# Patient Record
Sex: FEMALE | Race: WHITE | Employment: FULL TIME | URBAN - METROPOLITAN AREA
[De-identification: names, ages, dates, MRNs, and addresses within clinical notes are randomized per-mention and may not be internally consistent; named-entity substitution may affect disease eponyms.]

---
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
## 2020-01-04 ENCOUNTER — MYC MEDICAL ADVICE (OUTPATIENT)
Dept: FAMILY MEDICINE | Facility: CLINIC | Age: 20
End: 2020-01-04

## 2020-01-30 ENCOUNTER — OFFICE VISIT (OUTPATIENT)
Dept: PSYCHIATRY | Facility: CLINIC | Age: 20
End: 2020-01-30
Attending: PSYCHOLOGIST
Payer: COMMERCIAL

## 2020-01-30 DIAGNOSIS — F43.10 POSTTRAUMATIC STRESS DISORDER: Primary | ICD-10-CM

## 2020-01-31 NOTE — PROGRESS NOTES
OUTPATIENT PSYCHOTHERAPY PROGRESS NOTE    Client Name: Cassidy Fernandes   YOB: 2000 (19 year old)   Date of Service:  01/30/20  Time of Service: 3:00pm to 4:00pm (60 minutes)  Service Type(s):  25875 psychotherapy (38-52 min. with patient and/or family)    Diagnoses:   F43.10 Post-Traumatic Stress Disorder (by history)    Individuals Present: Cassidy    Treatment goal(s) being addressed: maintain progress    Subjective:  Cassidy provided updates for the past 4 months. 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 She loves her job and coworkers and is making friends with them as well. Her relationship is strong. She and her boyfriend moved out to an apartment in [place], which they enjoy.    Cassidy has been worried about her little sisters and the fact that they have not had mental health support around the family's complex trauma history and dad's cancer diagnosis. Cassidy feels some sense of guilt that mental health breakdown and subsequent treatment inadvertently took her parents' time and attention away from her siblings.    Cassidy reflected on how much she's grown since over the past year and a half. She feels a sense of maturity and priorities relative to other teenagers.    Treatment:   Cassidy was seen for individual cognitive behavioral and supportive psychotherapy. Today's session focused on obtaining updates, and processing thoughts and feelings around father's health, and Cassidy's own guilt re: siblings' mental health. Also discussed current housing, employment, and relationship goals. Cassidy would like to resume intermittent therapy sessions for progress monitoring and maintenance.    Assessment and Progress:   Cassidy is an 19 year-old female with a complex trauma history and posttraumatic stress disorder. Cassidy is doing well, overall. She and her boyfriend found housing that they are pleased with. She obtained her 's license and  has a new job that she is happy with.  Cassidy is thinking about future plans and best ways to obtain positive goals. She hs some guilt around her siblings' current mental health challenges, which she feels were 'neglected' due to parents' time and attention focused on her own needs.  Cassidy's relationships are stable and supportive. Affect was euthymic. Good eye contact. Motor activity was fidgety and restless. Cassidy was reflective and engaged. No mood or safety concerns.    Plan:   Cassidy will call as needed to schedule follow-up session for progress maintinance.      Anna Marie Gonzalez, PhD,       Licensed Psychologist    Treatment Plan review due: next visit

## 2020-02-17 ENCOUNTER — OFFICE VISIT (OUTPATIENT)
Dept: FAMILY MEDICINE | Facility: CLINIC | Age: 20
End: 2020-02-17
Payer: COMMERCIAL

## 2020-02-17 VITALS
OXYGEN SATURATION: 98 % | RESPIRATION RATE: 16 BRPM | SYSTOLIC BLOOD PRESSURE: 119 MMHG | HEIGHT: 66 IN | HEART RATE: 81 BPM | BODY MASS INDEX: 29.09 KG/M2 | DIASTOLIC BLOOD PRESSURE: 80 MMHG | TEMPERATURE: 98.2 F | WEIGHT: 181 LBS

## 2020-02-17 DIAGNOSIS — S29.011A PECTORALIS MUSCLE STRAIN, INITIAL ENCOUNTER: Primary | ICD-10-CM

## 2020-02-17 PROCEDURE — 99213 OFFICE O/P EST LOW 20 MIN: CPT | Performed by: FAMILY MEDICINE

## 2020-02-17 ASSESSMENT — MIFFLIN-ST. JEOR: SCORE: 1605.63

## 2020-02-17 ASSESSMENT — PAIN SCALES - GENERAL: PAINLEVEL: SEVERE PAIN (7)

## 2020-02-17 NOTE — PROGRESS NOTES
"Subjective     Cassidy Fernandes is a 19 year old female who presents to clinic today for the following health issues:    HPI   Joint Pain    Onset: 1.5 Week    Description:   Location: right shoulder  Character: Sharp    Intensity: moderate    Progression of Symptoms: same    Accompanying Signs & Symptoms:  Other symptoms: radiation of pain to right arm, numbness and tingling    History:   Previous similar pain: no       Precipitating factors:   Trauma or overuse: no     Alleviating factors:  Improved by: nothing    Therapies Tried and outcome: Ibuprofen, Icy hot, Massaging     Has started bowling more going every weekend for the last 1.5 months.    Reviewed and updated as needed this visit by Provider  Tobacco  Allergies  Meds  Problems  Med Hx  Surg Hx  Fam Hx         Review of Systems   ROS COMP: Constitutional, HEENT, cardiovascular, pulmonary, gi and gu systems are negative, except as otherwise noted.      Objective    /80 (BP Location: Left arm, Patient Position: Sitting, Cuff Size: Adult Large)   Pulse 81   Temp 98.2  F (36.8  C) (Oral)   Resp 16   Ht 1.665 m (5' 5.55\")   Wt 82.1 kg (181 lb)   LMP 01/19/2020 (Approximate)   SpO2 98%   Breastfeeding No   BMI 29.62 kg/m    Body mass index is 29.62 kg/m .  Physical Exam   GENERAL: healthy, alert and no distress  NECK: no adenopathy, no asymmetry, masses, or scars and thyroid normal to palpation  RESP: lungs clear to auscultation - no rales, rhonchi or wheezes  CV: regular rate and rhythm, normal S1 S2, no S3 or S4, no murmur, click or rub, no peripheral edema and peripheral pulses strong  ABDOMEN: soft, nontender, no hepatosplenomegaly, no masses and bowel sounds normal  MS: pain with engagement of upper pec muscle on left  NEURO: Normal strength and tone, mentation intact and speech normal    Diagnostic Test Results:  Labs reviewed in Epic        Assessment & Plan     1. Pectoralis muscle strain, initial encounter  Rest, stretching and " "monitor.  Follow up in 2 weeks if not improved for further evaluation.       BMI:   Estimated body mass index is 29.62 kg/m  as calculated from the following:    Height as of this encounter: 1.665 m (5' 5.55\").    Weight as of this encounter: 82.1 kg (181 lb).   Weight management plan: Discussed healthy diet and exercise guidelines      Return in about 2 weeks (around 3/2/2020), or if symptoms worsen or fail to improve.    Basia Gimenez MD  [facility]      "

## 2020-02-17 NOTE — PATIENT INSTRUCTIONS
At [facility], we strive to deliver an exceptional experience to you, every time we see you. If you receive a survey, please complete it as we do value your feedback.  If you have MyChart, you can expect to receive results automatically within 24 hours of their completion.  Your provider will send a note interpreting your results as well.   If you do not have MyChart, you should receive your results in about a week by mail.    Your care team:                            Family Medicine Internal Medicine   MD Vicente Gutierrez MD Shantel Branch-Fleming, MD Katya Georgiev PA-C Megan Hill, APRHARSHIL Duran, MD Pediatrics   Bharath Hilliard, PALeandroC  Leila Zamarripa, MD Elsa Cordova APRN CNP   MD Patricia Gurrola MD Deborah Mielke, MD Kim Thein, APRN CNP      Clinic hours: Monday - Thursday 7 am-7 pm; Fridays 7 am-5 pm.   Urgent care: Monday - Friday 11 am-9 pm; Saturday and Sunday 9 am-5 pm.  Pharmacy : Monday -Thursday 8 am-8 pm; Friday 8 am-6 pm; Saturday and Sunday 9 am-5 pm.     Clinic: [phone]   Pharmacy: [phone]

## 2020-04-03 ENCOUNTER — MYC MEDICAL ADVICE (OUTPATIENT)
Dept: FAMILY MEDICINE | Facility: CLINIC | Age: 20
End: 2020-04-03

## 2020-06-30 ENCOUNTER — E-VISIT (OUTPATIENT)
Dept: FAMILY MEDICINE | Facility: CLINIC | Age: 20
End: 2020-06-30
Payer: COMMERCIAL

## 2020-06-30 DIAGNOSIS — J06.9 VIRAL URI WITH COUGH: Primary | ICD-10-CM

## 2020-06-30 PROCEDURE — 99421 OL DIG E/M SVC 5-10 MIN: CPT | Performed by: PHYSICIAN ASSISTANT

## 2020-06-30 NOTE — PATIENT INSTRUCTIONS
Thank you for choosing us for your care. Based on your symptoms and length of illness, I do not think that you need a prescription at this time.  Please follow the care advise I've provided and use the over the counter medications to help relieve your symptoms.   View your full visit summary for details by clicking on the link below.     If you're not feeling better within 2-3 days, please respond to this message and we can consider if a prescription is needed.  You can schedule an appointment right here in [facility] [place], or call [phone]  If the visit is for the same symptoms as your e-visit, we'll refund the cost of your e-visit if seen within seven days.        Viral Upper Respiratory Illness (Adult)    You have a viral upper respiratory illness (URI), which is another term for the common cold. This illness is contagious during the first few days. It is spread through the air by coughing and sneezing. It may also be spread by direct contact (touching the sick person and then touching your own eyes, nose, or mouth). Frequent handwashing will decrease risk of spread. Most viral illnesses go away within 7 to 10 days with rest and simple home remedies. Sometimes the illness may last for several weeks. Antibiotics will not kill a virus, and they are generally not prescribed for this condition.  Home care    If symptoms are severe, rest at home for the first 2 to 3 days. When you resume activity, don't let yourself get too tired.    Don't smoke. If you need help stopping, talk with your healthcare provider.    Avoid being exposed to cigarette smoke (yours or others ).    You may use acetaminophen or ibuprofen to control pain and fever, unless another medicine was prescribed. If you have chronic liver or kidney disease, have ever had a stomach ulcer or gastrointestinal bleeding, or are taking blood-thinning medicines, talk with your healthcare provider before using these medicines. Aspirin should never be given to  anyone under 18 years of age who is ill with a viral infection or fever. It may cause severe liver or brain damage.    Your appetite may be poor, so a light diet is fine. Stay well hydrated by drinking 6 to 8 glasses of fluids per day (water, soft drinks, juices, tea, or soup). Extra fluids will help loosen secretions in the nose and lungs.    Over-the-counter cold medicines will not shorten the length of time you re sick, but they may be helpful for the following symptoms: cough, sore throat, and nasal and sinus congestion. If you take prescription medicines, ask your healthcare provider or pharmacist which over-the-counter medicines are safe to use. (Note: Don't use decongestants if you have high blood pressure.)  Follow-up care  Follow up with your healthcare provider, or as advised.  When to seek medical advice  Call your healthcare provider right away if any of these occur:    Cough with lots of colored sputum (mucus)    Severe headache; face, neck, or ear pain    Difficulty swallowing due to throat pain    Fever of 100.4 F (38 C) or higher, or as directed by your healthcare provider  Call 911  Call 911 if any of these occur:    Chest pain, shortness of breath, wheezing, or difficulty breathing    Coughing up blood    Very severe pain with swallowing, especially if it goes along with a muffled voice   Date Last Reviewed: 6/1/2018 2000-2019 [employer]. [address] [place] [address]. All rights reserved. This information is not intended as a substitute for professional medical care. Always follow your healthcare professional's instructions.

## 2020-06-30 NOTE — LETTER
[address] [id number] [phone]     Susu is ill and should be allowed to stay home to avoid spreading illness.  SHe is excused June 30, 2020 until wuthout fever for 24 hours.            MARIA D WESTBROOK MD        June 30, 2020

## 2020-11-16 ENCOUNTER — HEALTH MAINTENANCE LETTER (OUTPATIENT)
Age: 20
End: 2020-11-16

## 2020-12-16 ENCOUNTER — MYC REFILL (OUTPATIENT)
Dept: FAMILY MEDICINE | Facility: CLINIC | Age: 20
End: 2020-12-16

## 2020-12-16 DIAGNOSIS — J45.990 EXERCISE-INDUCED ASTHMA: ICD-10-CM

## 2020-12-16 RX ORDER — ALBUTEROL SULFATE 90 UG/1
2 AEROSOL, METERED RESPIRATORY (INHALATION) EVERY 6 HOURS PRN
Qty: 18 G | Refills: 1 | Status: CANCELLED | OUTPATIENT
Start: 2020-12-16

## 2020-12-18 RX ORDER — ALBUTEROL SULFATE 90 UG/1
2 AEROSOL, METERED RESPIRATORY (INHALATION) EVERY 6 HOURS PRN
Qty: 18 G | Refills: 0 | Status: SHIPPED | OUTPATIENT
Start: 2020-12-18 | End: 2022-07-07

## 2020-12-18 NOTE — TELEPHONE ENCOUNTER
"Pending Prescriptions:                       Disp   Refills    albuterol (PROAIR HFA/PROVENTIL HFA/MEDARDO*18 g   1            Sig: Inhale 2 puffs into the lungs every 6 hours as           needed for shortness of breath / dyspnea or           wheezing    Routing refill request to provider for review/approval because:  ACT not current:  10/16/19    Pennie Carpenter RN    Requested Prescriptions   Pending Prescriptions Disp Refills     albuterol (PROAIR HFA/PROVENTIL HFA/VENTOLIN HFA) 108 (90 Base) MCG/ACT inhaler 18 g 1     Sig: Inhale 2 puffs into the lungs every 6 hours as needed for shortness of breath / dyspnea or wheezing       Asthma Maintenance Inhalers - Anticholinergics Failed - 12/16/2020  1:57 PM        Failed - Asthma control assessment score within normal limits in last 6 months     Please review ACT score.           Failed - Recent (6 mo) or future (30 days) visit within the authorizing provider's specialty     Patient had office visit in the last 6 months or has a visit in the next 30 days with authorizing provider or within the authorizing provider's specialty.  See \"Patient Info\" tab in inbasket, or \"Choose Columns\" in Meds & Orders section of the refill encounter.            Passed - Patient is age 12 years or older        Passed - Medication is active on med list       Short-Acting Beta Agonist Inhalers Protocol  Failed - 12/16/2020  1:57 PM        Failed - Asthma control assessment score within normal limits in last 6 months     Please review ACT score.           Failed - Recent (6 mo) or future (30 days) visit within the authorizing provider's specialty     Patient had office visit in the last 6 months or has a visit in the next 30 days with authorizing provider or within the authorizing provider's specialty.  See \"Patient Info\" tab in inbasket, or \"Choose Columns\" in Meds & Orders section of the refill encounter.            Passed - Patient is age 12 or older        Passed - Medication is active on " med list

## 2021-09-18 ENCOUNTER — HEALTH MAINTENANCE LETTER (OUTPATIENT)
Age: 21
End: 2021-09-18

## 2021-10-05 ENCOUNTER — MYC MEDICAL ADVICE (OUTPATIENT)
Dept: FAMILY MEDICINE | Facility: CLINIC | Age: 21
End: 2021-10-05

## 2021-10-06 ENCOUNTER — OFFICE VISIT (OUTPATIENT)
Dept: PEDIATRICS | Facility: CLINIC | Age: 21
End: 2021-10-06
Payer: COMMERCIAL

## 2021-10-06 ENCOUNTER — MYC MEDICAL ADVICE (OUTPATIENT)
Dept: PEDIATRICS | Facility: CLINIC | Age: 21
End: 2021-10-06

## 2021-10-06 VITALS
WEIGHT: 206 LBS | SYSTOLIC BLOOD PRESSURE: 128 MMHG | DIASTOLIC BLOOD PRESSURE: 80 MMHG | OXYGEN SATURATION: 98 % | BODY MASS INDEX: 33.71 KG/M2 | HEART RATE: 100 BPM | TEMPERATURE: 98.2 F

## 2021-10-06 DIAGNOSIS — J06.9 VIRAL UPPER RESPIRATORY TRACT INFECTION: Primary | ICD-10-CM

## 2021-10-06 PROCEDURE — 99213 OFFICE O/P EST LOW 20 MIN: CPT | Performed by: STUDENT IN AN ORGANIZED HEALTH CARE EDUCATION/TRAINING PROGRAM

## 2021-10-06 PROCEDURE — U0003 INFECTIOUS AGENT DETECTION BY NUCLEIC ACID (DNA OR RNA); SEVERE ACUTE RESPIRATORY SYNDROME CORONAVIRUS 2 (SARS-COV-2) (CORONAVIRUS DISEASE [COVID-19]), AMPLIFIED PROBE TECHNIQUE, MAKING USE OF HIGH THROUGHPUT TECHNOLOGIES AS DESCRIBED BY CMS-2020-01-R: HCPCS | Performed by: STUDENT IN AN ORGANIZED HEALTH CARE EDUCATION/TRAINING PROGRAM

## 2021-10-06 PROCEDURE — U0005 INFEC AGEN DETEC AMPLI PROBE: HCPCS | Performed by: STUDENT IN AN ORGANIZED HEALTH CARE EDUCATION/TRAINING PROGRAM

## 2021-10-06 NOTE — PROGRESS NOTES
Assessment & Plan     (J06.9) Viral upper respiratory tract infection  (primary encounter diagnosis)  Comment: Symptoms align with URI. Because of tender sinuses, could possible be a developing sinusitis although does not meet criteria at this time. No indication for more treatment than supportive care at this time. Plan to follow up with symptoms in 2 weeks at preventative care visit. Discussed getting COVID vaccine at routine preventative visit as well.  Plan:  Symptomatic COVID-19 Virus         (Coronavirus) by PCR Nasopharyngeal        Supportive care discussed and return to work when symptoms improved recommended. Letter provided that excuses Cassidy from work retroactively and recommends returning after second negative COVID test.     20 minutes spent on the date of the encounter doing chart review, history and exam, documentation and further activities per the note        Return in about 2 weeks (around 10/20/2021) for Routine preventive.    This patient was seen along with Emy Navarrete, PNP student. The histories and review of systems were obtained by her and confirmed by myself. All objective, exam assessments and plans were completed by myself.     Freda Yoon MD  [facility]    Lazaro Benjamin is a 21 year old who presents for the following health issues.    HPI     Acute Illness  Acute illness concerns: URI  Onset/Duration: 10/6/21  Symptoms:  Fever: no  Chills/Sweats: no  Headache (location?): YES  Sinus Pressure: YES  Conjunctivitis:  no  Ear Pain: YES- plugged  Rhinorrhea: YES  Congestion: YES  Sore Throat: YES  Cough: YES-non-productive, dry  Wheeze: no  Decreased Appetite: no  Nausea: no  Vomiting: no  Diarrhea: no  Dysuria/Freq.: no  Dysuria or Hematuria: no  Fatigue/Achiness: no  Sick/Strep Exposure: no  Therapies tried and outcome: Nyquil and Dayquil    Symptoms of scratchiness in throat started Saturday. Monday symptoms worsened with low grad fever and more  congestion, full sinus, and sore throat symptoms. Got COVID test and stayed home since. COVID came back negative but cough symptoms started yesterday. Has been taking Nyquil and Dayquil to help symptoms. Has been taking albuterol inhaler more frequently of 1-2 times per day. Boyfriend also sick at home.     Works as  in post office. Requesting note to excuse her from work for past days. Working Thursday through Monday.        Objective    /80 (BP Location: Right arm)   Pulse 100   Temp 98.2  F (36.8  C) (Oral)   Wt 206 lb (93.4 kg)   SpO2 98%   BMI 33.71 kg/m    Body mass index is 33.71 kg/m .  Physical Exam   GENERAL: healthy, alert and no distress  EYES: Eyes grossly normal to inspection, PERRL and conjunctivae and sclerae normal  HENT: ear canals and TM's normal, nose and mouth without ulcers or lesions. Audible nasal congestion. Mildly tender maxillary sinuses.  NECK: tender cervical lymph nodes, no asymmetry, masses, or scars and thyroid normal to palpation  RESP: lungs clear to auscultation - no rales, rhonchi or wheezes  CV: regular rate and rhythm, normal S1 S2, no S3 or S4, no murmur, click or rub, no peripheral edema and peripheral pulses strong  ABDOMEN: soft, nontender, no hepatosplenomegaly, no masses.    ----- Services Performed by a PNP STUDENT in Presence of ATTENDING Physician-------

## 2021-10-06 NOTE — PATIENT INSTRUCTIONS
COVID test will come back in 1-2 days.     Drink fluids and take honey for cough suppression.     Patient Education     Viral Upper Respiratory Illness (Adult)    You have a viral upper respiratory illness (URI), which is another term for the common cold. This illness is contagious during the first few days. It is spread through the air by coughing and sneezing. It may also be spread by direct contact (touching the sick person and then touching your own eyes, nose, or mouth). Frequent handwashing will decrease risk of spread. Most viral illnesses go away within 7 to 10 days with rest and simple home remedies. Sometimes the illness may last for several weeks. Antibiotics will not kill a virus, and they are generally not prescribed for this condition.  Home care    If symptoms are severe, rest at home for the first 2 to 3 days. When you resume activity, don't let yourself get too tired.    Don't smoke. If you need help stopping, talk with your healthcare provider.    Avoid being exposed to cigarette smoke (yours or others ).    You may use acetaminophen or ibuprofen to control pain and fever, unless another medicine was prescribed. If you have chronic liver or kidney disease, have ever had a stomach ulcer or gastrointestinal bleeding, or are taking blood-thinning medicines, talk with your healthcare provider before using these medicines. Aspirin should never be given to anyone under 18 years of age who is ill with a viral infection or fever. It may cause severe liver or brain damage.    Your appetite may be poor, so a light diet is fine. Stay well hydrated by drinking 6 to 8 glasses of fluids per day (water, soft drinks, juices, tea, or soup). Extra fluids will help loosen secretions in the nose and lungs.    Over-the-counter cold medicines will not shorten the length of time you re sick, but they may be helpful for the following symptoms: cough, sore throat, and nasal and sinus congestion. If you take prescription  medicines, ask your healthcare provider or pharmacist which over-the-counter medicines are safe to use. (Note: Don't use decongestants if you have high blood pressure.)  Follow-up care  Follow up with your healthcare provider, or as advised.  When to seek medical advice  Call your healthcare provider right away if any of these occur:    Cough with lots of colored sputum (mucus)    Severe headache; face, neck, or ear pain    Difficulty swallowing due to throat pain    Fever of 100.4 F (38 C) or higher, or as directed by your healthcare provider  Call 911  Call 911 if any of these occur:    Chest pain, shortness of breath, wheezing, or difficulty breathing    Coughing up blood    Very severe pain with swallowing, especially if it goes along with a muffled voice   [employer] last reviewed this educational content on 6/1/2018 2000-2021 [employer]. All rights reserved. This information is not intended as a substitute for professional medical care. Always follow your healthcare professional's instructions.

## 2021-10-06 NOTE — LETTER
[address] [address]  Phone: [phone]  Fax: [phone]    October 6, 2021        Cassidy Fernandes  [address] [place] [phone]          To whom it may concern:    RE: Cassidy Fernandes    Please excuse from work starting 10/2/21 due to cold-like symptoms. She will have COVID testing performed on 10/6 and should not return to work until this is confirmed negative. Thank you for understanding.     Please contact me for questions or concerns.      Sincerely,        Freda Yoon MD

## 2021-10-07 LAB — SARS-COV-2 RNA RESP QL NAA+PROBE: NEGATIVE

## 2022-01-04 ENCOUNTER — E-VISIT (OUTPATIENT)
Dept: PEDIATRICS | Facility: CLINIC | Age: 22
End: 2022-01-04
Payer: COMMERCIAL

## 2022-01-04 DIAGNOSIS — Z53.9 ERRONEOUS ENCOUNTER--DISREGARD: Primary | ICD-10-CM

## 2022-01-04 NOTE — TELEPHONE ENCOUNTER
I tried to call family and left a message about Covid testing . We do not have any openings for several days. I recommend patient call back and set up a virtual visit if needed to further discuss and evaluate

## 2022-01-06 ENCOUNTER — E-VISIT (OUTPATIENT)
Dept: URGENT CARE | Facility: URGENT CARE | Age: 22
End: 2022-01-06
Payer: COMMERCIAL

## 2022-01-06 DIAGNOSIS — Z20.822 SUSPECTED COVID-19 VIRUS INFECTION: Primary | ICD-10-CM

## 2022-01-06 PROCEDURE — 99421 OL DIG E/M SVC 5-10 MIN: CPT | Performed by: PHYSICIAN ASSISTANT

## 2022-01-06 NOTE — PATIENT INSTRUCTIONS
Cassidy,    Your symptoms show that you may have coronavirus (COVID-19). This illness can cause fever, cough and trouble breathing. Many people get a mild case and get better on their own. Some people can get very sick.      What should I do?  We would like to test you for COVID-19 virus, Influenza and Strep. I have placed orders for these tests. To schedule: go to your [employer] home page and scroll down to the section that says  You have an appointment that needs to be scheduled  and click the large green button that says  Schedule Now  and follow the steps to find the next available openings. It is important that when you are asked what the reason for your appointment is that you mention you need COVID, Influenza and Strep tests.    If you are unable to complete these [employer] scheduling steps, please call [phone] to schedule your testing.     Return to work/school/ guidance:   Please let your workplace manager and staffing office know when your isolation ends.       If you receive a positive COVID-19 test result, follow the guidance of the those who are giving you the results. Usually the return to work is 10 days from symptom onset or positive test date, whichever comes first (or in some cases 20 days if you are immunocompromised). If your symptoms started after your positive test, the 10 days should start when your symptoms started.   o If you work at [facility], you must also be cleared by Employee Occupational Health and Safety to return to work.      If you receive a negative COVID-19 test result and did not have a high risk exposure to someone with a known positive COVID-19 test, you can return to work once you're free of fever for 24 hours without fever-reducing medication and your symptoms are improving or resolved.    If you receive a negative COVID-19 test and if you had a high risk exposure to someone who has tested positive for COVID-19 then you can return to work 14 days after your  last contact with the positive individual. Follow quarantine guidance given by your doctor or public health officials.    Sign up for [email or web address].   We know it's scary to hear that you might have COVID-19. We want to track your symptoms to make sure you're okay over the next 2 weeks. Please look for an email from [email or web address]--this is a free, online program that we'll use to keep in touch. To sign up, follow the link in the email you will receive. Learn more at [email or web address]    How can I take care of myself?  Over the counter medications may help with your symptoms like congestion, cough, chills, or fever.    There are not many effective prescription treatments for early COVID-19. Hydroxychloroquine, ivermectin, and azithromycin are not effective or recommended for COVID-19.    If your symptoms started in the last 10 days, you may be able to receive a treatment with monoclonal antibodies. This treatment can lower your risk of severe illness and going to the hospital. It is given through an IV or under your skin (subcutaneous) and must be given at an infusion center. You must be 12 or older, weight at least 88 pounds, and have a positive COVID-19 test.      If you would like to sign up to be considered to receive the monoclonal antibody medicine, please complete a participation form through the [facility] here:  MNRAP ([email or web address]/[email or web address]/[email or web address]/[email or web address]). You may also call the [facility] COVID-19 Public Hotline at 1-308.857.2771 (open Mon-Fri: 9am-7pm and Sat: 10am-6pm).     Not all people who are eligible will receive the medicine, since supply is limited. You will be contacted in the next 1 to 2 business days only if you are selected. If you do not receive a call, you have not been selected to receive the medicine. If you have any questions about this medication, please contact your primary care provider. For more information, see  [email or web address] include 234 beds at the [facility].[place]/diseases/coronavirus/meds.pdf      Get lots of rest. Drink extra fluids (unless a doctor has told you not to)    Take Tylenol (acetaminophen) or ibuprofen for fever or pain. If you have liver or kidney problems, ask your family doctor if it's okay to take Tylenol or ibuprofen    Take over the counter medications for your symptoms, as directed by your doctor. You may also talk to your pharmacist.      If you have other health problems (like cancer, heart failure, an organ transplant or severe kidney disease): Call your specialty clinic if you don't feel better in the next 2 days.    Know when to call 911. Emergency warning signs include:  o Trouble breathing or shortness of breath  o Pain or pressure in the chest that doesn't go away  o Feeling confused like you haven't felt before, or not being able to wake up  o Bluish-colored lips or face    Where can I get more information?    [facility] - About COVID-19: [email or web address]     CDC - What to Do If You're Sick:   www.cdc.gov/coronavirus/2019-ncov/about/steps-when-sick.html    CDC - Ending Home Isolation:  https://www.cdc.gov/coronavirus/2019-ncov/your-health/quarantine-isolation.html    CDC - Caring for Someone:  www.cdc.gov/coronavirus/2019-ncov/if-you-are-sick/care-for-someone.html    [facility] clinical trials (COVID-19 research studies): clinicalaffairs.[facility]-clinical-trials    Below are the COVID-19 hotlines at the [facility] ([facility]). Interpreters are available.  o For health questions: Call 648-709-1521 or 1-905.815.3857 (7 a.m. to 7 p.m.)  o For questions about schools and childcare: Call 968-575-0267 or 1-188.244.9837 (7 a.m. to 7 p.m.)  January 6, 2022  RE:  Cassidy Fernandes                                                                                                                  [address] [place] [id number]      To whom it may concern:    I evaluated  Cassidy Fernandes on January 6, 2022. Cassidy Fernandes should be excused from work/school.     They should let their workplace manager and staffing office know when their quarantine ends.    We can not give an exact date as it depends on the information below. They can calculate this on their own or work with their manager/staffing office to calculate this. (For example if they were exposed on 10/04, they would have to quarantine for 14 full days. That would be through 10/18. They could return on 10/19.)    Quarantine Guidelines:    If patient receives a positive COVID-19 test result, they should follow the guidance of those who are giving the results. Usually the return to work is 10 (or in some cases 20 days from symptom onset.) If they work at [employer], they must be cleared by Employee Occupational Health and Safety to return to work.      If patient receives a negative COVID-19 test result and did not have a high risk exposure to someone with a known positive COVID-19 test, they can return to work once they're free of fever for 24 hours without fever-reducing medication and their symptoms are improving or resolved.    If patient receives a negative COVID-19 test and if they had a high risk exposure to someone who has tested positive for COVID-19 then they can return to work 14 days after their last contact with the positive individual    Note: If there is ongoing exposure to the covid positive person, this quarantine period may be longer than 14 days. (For example, if they are continually exposed to their child, who tested positive and cannot isolate from them, then the quarantine of 7-14 days can't start until their child is no longer contagious. This is typically 10 days from onset to the child's symptoms. So the total duration may be 17-24 days in this case.)     Sincerely,  BALTAZAR Fenton

## 2022-01-08 ENCOUNTER — HEALTH MAINTENANCE LETTER (OUTPATIENT)
Age: 22
End: 2022-01-08

## 2022-01-08 ENCOUNTER — NURSE TRIAGE (OUTPATIENT)
Dept: NURSING | Facility: CLINIC | Age: 22
End: 2022-01-08
Payer: COMMERCIAL

## 2022-01-08 NOTE — TELEPHONE ENCOUNTER
Triage call:     Patient calling COVID positive 1/6  Symptoms started 1/3  Symptoms are not improving   Looking for doctors note for COVID positive results- she was tested at  labs and received a letter with results.   History of asthma   Slight chest pressure that comes and goes   No chest pressure at the time of the call.   SOB when she talks or on exertion   Using inhaler- helping   Body aches, chills. Unable to confirm temperature.     Page to on call doctor at 1021. Pavithra Bingham   Per on call provider, okay to monitor symptoms at home. Use inhaler every 4-6 hours until symptoms improve. If new or worsening symptoms, go to ED for evaluation.     Outbound call to patient to relay information. She verbalizes understanding and agreement with plan. Discussed e-visit for additional information on doctors note for work.     Noemi Collins RN   01/08/22 10:36 AM  [facility] Nurse Advisor    Reason for Disposition    MILD difficulty breathing (e.g., minimal/no SOB at rest, SOB with walking, pulse <100)    Additional Information    Negative: Difficult to awaken or acting confused (e.g., disoriented, slurred speech)    Negative: SEVERE difficulty breathing (e.g., struggling for each breath, speaks in single words)    Negative: Bluish (or gray) lips or face now    Negative: Shock suspected (e.g., cold/pale/clammy skin, too weak to stand, low BP, rapid pulse)    Negative: Sounds like a life-threatening emergency to the triager    Negative: SEVERE or constant chest pain or pressure (Exception: mild central chest pain, present only when coughing)    Negative: [1] Headache AND [2] stiff neck (can't touch chin to chest)    Negative: MODERATE difficulty breathing (e.g., speaks in phrases, SOB even at rest, pulse 100-120)    Protocols used: CORONAVIRUS (COVID-19) DIAGNOSED OR [id number] 8.25.2021

## 2022-03-07 NOTE — PROGRESS NOTES
SUBJECTIVE:   CC: Cassidy Fernandes is an 21 year old woman who presents for preventive health visit.     Patient has been advised of split billing requirements and indicates understanding: Yes     Healthy Habits:     Getting at least 3 servings of Calcium per day:  NO    Bi-annual eye exam:  Yes    Dental care twice a year:  NO    Sleep apnea or symptoms of sleep apnea:  None    Diet:  Other    Frequency of exercise:  1 day/week    Duration of exercise:  Less than 15 minutes    Taking medications regularly:  Yes    Medication side effects:  Not applicable    PHQ-2 Total Score: 0    Additional concerns today:  No          Today's PHQ-2 Score:   PHQ-2 ( 1999 [employer]) 3/8/2022   Q1: Little interest or pleasure in doing things 0   Q2: Feeling down, depressed or hopeless 0   PHQ-2 Score 0   PHQ-2 Total Score (12-17 Years)- Positive if 3 or more points; Administer PHQ-A if positive -   Q1: Little interest or pleasure in doing things Not at all   Q2: Feeling down, depressed or hopeless Not at all   PHQ-2 Score 0       Abuse: Current or Past (Physical, Sexual or Emotional) - No  Do you feel safe in your environment? Yes    Have you ever done Advance Care Planning? (For example, a Health Directive, POLST, or a discussion with a medical provider or your loved ones about your wishes): No, advance care planning information given to patient to review.  Patient declined advance care planning discussion at this time.    Social History     Tobacco Use     Smoking status: Passive Smoke Exposure - Never Smoker     Smokeless tobacco: Never Used   Substance Use Topics     Alcohol use: Not Currently     If you drink alcohol do you typically have >3 drinks per day or >7 drinks per week? No    Alcohol Use 3/8/2022   Prescreen: >3 drinks/day or >7 drinks/week? No   Prescreen: >3 drinks/day or >7 drinks/week? -       Reviewed orders with patient.  Reviewed health maintenance and updated orders accordingly - Yes  Lab work is in  process  Labs reviewed in EPIC  BP Readings from Last 3 Encounters:   03/08/22 120/72   10/06/21 128/80   02/17/20 119/80    Wt Readings from Last 3 Encounters:   03/08/22 93.4 kg (205 lb 12.8 oz)   10/06/21 93.4 kg (206 lb)   02/17/20 82.1 kg (181 lb) (95 %, Z= 1.62)*     * Growth percentiles are based on [facility] (Girls, 2-20 Years) data.                  Patient Active Problem List   Diagnosis     Atopic dermatitis, unspecified atopic dermatitis     Exercise-induced asthma     Pain in thoracic spine     Amitriptyline overdose     Depressed     Adjustment disorder with mixed anxiety and depressed mood     Major depressive disorder, recurrent, moderate (H)     Posttraumatic stress disorder     Herpes simplex vulvovaginitis     History reviewed. No pertinent surgical history.    Social History     Tobacco Use     Smoking status: Passive Smoke Exposure - Never Smoker     Smokeless tobacco: Never Used   Substance Use Topics     Alcohol use: Not Currently     Family History   Problem Relation Age of Onset     Cancer - colorectal Paternal Grandmother      Thyroid Disease Paternal Grandmother         unknown type     Personality Disorder Mother         borderline     Other Cancer Father         neuroendocrine pancreatic cancer     Hemochromatosis Maternal Grandmother      Suicide Maternal Grandfather      Depression Sister      Anxiety Disorder Sister      C.A.D. No family hx of      Diabetes No family hx of      Hypertension No family hx of      Cerebrovascular Disease No family hx of      Breast Cancer No family hx of      Prostate Cancer No family hx of          Current Outpatient Medications   Medication Sig Dispense Refill     acetaminophen (TYLENOL) 500 MG tablet Take 1 tablet (500 mg) by mouth every 6 hours as needed for fever or pain 30 tablet 0     albuterol (PROAIR HFA/PROVENTIL HFA/VENTOLIN HFA) 108 (90 Base) MCG/ACT inhaler Inhale 2 puffs into the lungs every 6 hours as needed for shortness of breath / dyspnea or  "wheezing . No further refills until follow-up appointment 18 g 0     ibuprofen (ADVIL/MOTRIN) 600 MG tablet Take 1 tablet (600 mg) by mouth every 6 hours as needed for pain 30 tablet 0     Prenatal Vit-Fe Fumarate-FA (PRENATAL MULTIVITAMIN W/IRON) 27-0.8 MG tablet Take 1 tablet by mouth daily 90 tablet 3     No Known Allergies    Breast Cancer Screening:        History of abnormal Pap smear: NO - age 21-29 PAP every 3 years recommended     Reviewed and updated as needed this visit by clinical staff   Tobacco  Allergies  Meds   Med Hx  Surg Hx  Fam Hx          Reviewed and updated as needed this visit by Provider     Meds   Med Hx  Surg Hx  Fam Hx         Past Medical History:   Diagnosis Date     Contact dermatitis and other eczema, due to unspecified cause      Mononucleosis     with hospitalization     Uncomplicated asthma       History reviewed. No pertinent surgical history.    Review of Systems  CONSTITUTIONAL: NEGATIVE for fever, chills, change in weight  INTEGUMENTARU/SKIN: NEGATIVE for worrisome rashes, moles or lesions  EYES: NEGATIVE for vision changes or irritation  ENT: NEGATIVE for ear, mouth and throat problems  RESP: NEGATIVE for significant cough or SOB  BREAST: NEGATIVE for masses, tenderness or discharge  CV: NEGATIVE for chest pain, palpitations or peripheral edema  GI: NEGATIVE for nausea, abdominal pain, heartburn, or change in bowel habits  : NEGATIVE for unusual urinary or vaginal symptoms. Periods are regular.  MUSCULOSKELETAL: NEGATIVE for significant arthralgias or myalgia  NEURO: NEGATIVE for weakness, dizziness or paresthesias  PSYCHIATRIC: NEGATIVE for changes in mood or affect     OBJECTIVE:   /72   Pulse 84   Resp 16   Ht 1.664 m (5' 5.5\")   Wt 93.4 kg (205 lb 12.8 oz)   LMP 02/22/2022   BMI 33.73 kg/m    Physical Exam  GENERAL: healthy, alert and no distress  EYES: Eyes grossly normal to inspection, PERRL and conjunctivae and sclerae normal  HENT: ear canals " and TM's normal, nose and mouth without ulcers or lesions  NECK: no adenopathy, no asymmetry, masses, or scars and thyroid normal to palpation  RESP: lungs clear to auscultation - no rales, rhonchi or wheezes  CV: regular rate and rhythm, normal S1 S2, no S3 or S4, no murmur, click or rub, no peripheral edema and peripheral pulses strong  ABDOMEN: soft, nontender, no hepatosplenomegaly, no masses and bowel sounds normal  MS: no gross musculoskeletal defects noted, no edema  PSYCH: mentation appears normal, affect normal/bright  :   On inspection, no obvious lesions, swelling or odor   Normal pubic hair growth extending to internalthigh bilaterally   Clitoral patton intact and urethra midline   Vaginal mucosa healthy and pink   Cervix normal in appearance without any significant friability or CMT.   No significant vaginal discharge at the posterior fornix   Pap smear collected    ASSESSMENT/PLAN:       ICD-10-CM    1. Routine general medical examination at a health care facility  Z00.00 Lipid Profile (Chol, Trig, HDL, LDL calc)     TSH with free T4 reflex     Prenatal Vit-Fe Fumarate-FA (PRENATAL MULTIVITAMIN W/IRON) 27-0.8 MG tablet     INFLUENZA VACCINE IM > 6 MONTHS VALENT IIV4 (AFLURIA/FLUZONE)     CBC with platelets     PPSV23, IM/SUBQ (2+ YRS) - [id number]     Hemoglobin A1c   2. Screening for STDs (sexually transmitted diseases)  Z11.3 Wet prep - Clinic Collect     NEISSERIA GONORRHOEA PCR     CHLAMYDIA TRACHOMATIS PCR     HIV Antigen Antibody Combo     Treponema Abs w Reflex to RPR and Titer   3. Cervical cancer screening  Z12.4 Pap Screen only - recommended age 21 - 24 years   4. Encounter for preconception consultation  Z31.69          - working full time at [employer]   - home life: lives with partner of 5+ years  - safe at home   - Sexually active -yes, with 1 male partner  - hx of genital herpes - not on antiviral , declines today.  Last flare was 2 years ago.  - STIs - no acute concerns but would like  "to get screening today.  Ordered.  GC/chlamydia swab collected and wet prep collected as well.  - Menses - regular, got off birth control last January. No menorrhagia or dysmnorrhea. Hx of depo and IUD.  Birth control causes mood swings for her  - mental health stable - hx of depression and anxiety. No therapy.   - elevated BMI - healthy lifestyle guidelines reviewed.  - needs UTD dental   - declining COVID 19 vaccine   -Okay with flu and PCV 23  -Asthma well-controlled, largely exercise-induced  - ETOH -occasional  -No marijuana use, vaping or drug use  - Neuroendocrine tumor in dad-unclear if it is genetic  -Due for first Pap, collected today.    Preconception counseling:  She and her partner would like to start having kids in a year.  Has monthly menses without dysmenorrhea or menorrhagia.  Wanted to know how she can optimize her chances of conceiving.  BMI 33-advised making lifestyle changes to lose weight to help improve BMI.  Will start on prenatal vitamins.      MDD/SIRISHA:.  PHQ9 TOTAL SCORE: 3  SIRISHA 7 TOTAL SCORE: 5  Has history of depression/anxiety.  Feels it is relatively well controlled.  Scoring low on PHQ-9 at 7.  No signs/HI today.  Not currently in therapy because she does not do well with virtual sessions.    Patient has been advised of split billing requirements and indicates understanding: Yes    COUNSELING:  Reviewed preventive health counseling, as reflected in patient instructions    Estimated body mass index is 33.73 kg/m  as calculated from the following:    Height as of this encounter: 1.664 m (5' 5.5\").    Weight as of this encounter: 93.4 kg (205 lb 12.8 oz).    Weight management plan: Discussed healthy diet and exercise guidelines    She reports that she is a non-smoker but has been exposed to tobacco smoke. She has been exposed to 0.00 packs per day. She has never used smokeless tobacco.      Alyssa Chin DO  [facility]"

## 2022-03-08 ENCOUNTER — OFFICE VISIT (OUTPATIENT)
Dept: FAMILY MEDICINE | Facility: CLINIC | Age: 22
End: 2022-03-08
Payer: COMMERCIAL

## 2022-03-08 VITALS
HEART RATE: 84 BPM | RESPIRATION RATE: 16 BRPM | SYSTOLIC BLOOD PRESSURE: 120 MMHG | HEIGHT: 66 IN | WEIGHT: 205.8 LBS | BODY MASS INDEX: 33.07 KG/M2 | DIASTOLIC BLOOD PRESSURE: 72 MMHG

## 2022-03-08 DIAGNOSIS — Z11.3 SCREENING FOR STDS (SEXUALLY TRANSMITTED DISEASES): ICD-10-CM

## 2022-03-08 DIAGNOSIS — Z12.4 CERVICAL CANCER SCREENING: ICD-10-CM

## 2022-03-08 DIAGNOSIS — Z31.69 ENCOUNTER FOR PRECONCEPTION CONSULTATION: ICD-10-CM

## 2022-03-08 DIAGNOSIS — Z00.00 ROUTINE GENERAL MEDICAL EXAMINATION AT A HEALTH CARE FACILITY: Primary | ICD-10-CM

## 2022-03-08 LAB
CHOLEST SERPL-MCNC: 175 MG/DL
CLUE CELLS: NORMAL
ERYTHROCYTE [DISTWIDTH] IN BLOOD BY AUTOMATED COUNT: 11.7 % (ref 10–15)
FASTING STATUS PATIENT QL REPORTED: NO
HBA1C MFR BLD: 5.4 % (ref 0–5.6)
HCT VFR BLD AUTO: 42.3 % (ref 35–47)
HDLC SERPL-MCNC: 55 MG/DL
HGB BLD-MCNC: 14 G/DL (ref 11.7–15.7)
HIV 1+2 AB+HIV1 P24 AG SERPL QL IA: NEGATIVE
LDLC SERPL CALC-MCNC: 96 MG/DL
MCH RBC QN AUTO: 30.8 PG (ref 26.5–33)
MCHC RBC AUTO-ENTMCNC: 33.1 G/DL (ref 31.5–36.5)
MCV RBC AUTO: 93 FL (ref 78–100)
PLATELET # BLD AUTO: 257 10E3/UL (ref 150–450)
RBC # BLD AUTO: 4.55 10E6/UL (ref 3.8–5.2)
T PALLIDUM AB SER QL: NONREACTIVE
TRICHOMONAS, WET PREP: NORMAL
TRIGL SERPL-MCNC: 119 MG/DL
TSH SERPL DL<=0.005 MIU/L-ACNC: 1.89 UIU/ML (ref 0.3–5)
WBC # BLD AUTO: 8.6 10E3/UL (ref 4–11)
WBC'S/HIGH POWER FIELD, WET PREP: NORMAL
YEAST, WET PREP: NORMAL

## 2022-03-08 PROCEDURE — 87210 SMEAR WET MOUNT SALINE/INK: CPT | Performed by: STUDENT IN AN ORGANIZED HEALTH CARE EDUCATION/TRAINING PROGRAM

## 2022-03-08 PROCEDURE — 80061 LIPID PANEL: CPT | Performed by: STUDENT IN AN ORGANIZED HEALTH CARE EDUCATION/TRAINING PROGRAM

## 2022-03-08 PROCEDURE — 90472 IMMUNIZATION ADMIN EACH ADD: CPT | Performed by: STUDENT IN AN ORGANIZED HEALTH CARE EDUCATION/TRAINING PROGRAM

## 2022-03-08 PROCEDURE — G0123 SCREEN CERV/VAG THIN LAYER: HCPCS | Performed by: STUDENT IN AN ORGANIZED HEALTH CARE EDUCATION/TRAINING PROGRAM

## 2022-03-08 PROCEDURE — 36415 COLL VENOUS BLD VENIPUNCTURE: CPT | Performed by: STUDENT IN AN ORGANIZED HEALTH CARE EDUCATION/TRAINING PROGRAM

## 2022-03-08 PROCEDURE — 83036 HEMOGLOBIN GLYCOSYLATED A1C: CPT | Performed by: STUDENT IN AN ORGANIZED HEALTH CARE EDUCATION/TRAINING PROGRAM

## 2022-03-08 PROCEDURE — 85027 COMPLETE CBC AUTOMATED: CPT | Performed by: STUDENT IN AN ORGANIZED HEALTH CARE EDUCATION/TRAINING PROGRAM

## 2022-03-08 PROCEDURE — 84443 ASSAY THYROID STIM HORMONE: CPT | Performed by: STUDENT IN AN ORGANIZED HEALTH CARE EDUCATION/TRAINING PROGRAM

## 2022-03-08 PROCEDURE — 90471 IMMUNIZATION ADMIN: CPT | Performed by: STUDENT IN AN ORGANIZED HEALTH CARE EDUCATION/TRAINING PROGRAM

## 2022-03-08 PROCEDURE — 87591 N.GONORRHOEAE DNA AMP PROB: CPT | Performed by: STUDENT IN AN ORGANIZED HEALTH CARE EDUCATION/TRAINING PROGRAM

## 2022-03-08 PROCEDURE — 90732 PPSV23 VACC 2 YRS+ SUBQ/IM: CPT | Performed by: STUDENT IN AN ORGANIZED HEALTH CARE EDUCATION/TRAINING PROGRAM

## 2022-03-08 PROCEDURE — 86780 TREPONEMA PALLIDUM: CPT | Performed by: STUDENT IN AN ORGANIZED HEALTH CARE EDUCATION/TRAINING PROGRAM

## 2022-03-08 PROCEDURE — 90686 IIV4 VACC NO PRSV 0.5 ML IM: CPT | Performed by: STUDENT IN AN ORGANIZED HEALTH CARE EDUCATION/TRAINING PROGRAM

## 2022-03-08 PROCEDURE — 87389 HIV-1 AG W/HIV-1&-2 AB AG IA: CPT | Performed by: STUDENT IN AN ORGANIZED HEALTH CARE EDUCATION/TRAINING PROGRAM

## 2022-03-08 PROCEDURE — 87491 CHLMYD TRACH DNA AMP PROBE: CPT | Performed by: STUDENT IN AN ORGANIZED HEALTH CARE EDUCATION/TRAINING PROGRAM

## 2022-03-08 PROCEDURE — 99395 PREV VISIT EST AGE 18-39: CPT | Mod: 25 | Performed by: STUDENT IN AN ORGANIZED HEALTH CARE EDUCATION/TRAINING PROGRAM

## 2022-03-08 RX ORDER — PRENATAL VIT/IRON FUM/FOLIC AC 27MG-0.8MG
1 TABLET ORAL DAILY
Qty: 90 TABLET | Refills: 3 | Status: SHIPPED | OUTPATIENT
Start: 2022-03-08 | End: 2023-06-02

## 2022-03-08 ASSESSMENT — ASTHMA QUESTIONNAIRES
QUESTION_5 LAST FOUR WEEKS HOW WOULD YOU RATE YOUR ASTHMA CONTROL: WELL CONTROLLED
ACT_TOTALSCORE: 16
QUESTION_2 LAST FOUR WEEKS HOW OFTEN HAVE YOU HAD SHORTNESS OF BREATH: ONCE A DAY
QUESTION_4 LAST FOUR WEEKS HOW OFTEN HAVE YOU USED YOUR RESCUE INHALER OR NEBULIZER MEDICATION (SUCH AS ALBUTEROL): ONCE A WEEK OR LESS
QUESTION_3 LAST FOUR WEEKS HOW OFTEN DID YOUR ASTHMA SYMPTOMS (WHEEZING, COUGHING, SHORTNESS OF BREATH, CHEST TIGHTNESS OR PAIN) WAKE YOU UP AT NIGHT OR EARLIER THAN USUAL IN THE MORNING: ONCE A WEEK
ACT_TOTALSCORE: 16
QUESTION_1 LAST FOUR WEEKS HOW MUCH OF THE TIME DID YOUR ASTHMA KEEP YOU FROM GETTING AS MUCH DONE AT WORK, SCHOOL OR AT HOME: SOME OF THE TIME

## 2022-03-08 ASSESSMENT — ANXIETY QUESTIONNAIRES
1. FEELING NERVOUS, ANXIOUS, OR ON EDGE: SEVERAL DAYS
3. WORRYING TOO MUCH ABOUT DIFFERENT THINGS: SEVERAL DAYS
7. FEELING AFRAID AS IF SOMETHING AWFUL MIGHT HAPPEN: SEVERAL DAYS
GAD7 TOTAL SCORE: 5
GAD7 TOTAL SCORE: 5
4. TROUBLE RELAXING: NOT AT ALL
5. BEING SO RESTLESS THAT IT IS HARD TO SIT STILL: SEVERAL DAYS
7. FEELING AFRAID AS IF SOMETHING AWFUL MIGHT HAPPEN: SEVERAL DAYS
GAD7 TOTAL SCORE: 5
2. NOT BEING ABLE TO STOP OR CONTROL WORRYING: SEVERAL DAYS
6. BECOMING EASILY ANNOYED OR IRRITABLE: NOT AT ALL

## 2022-03-08 ASSESSMENT — PATIENT HEALTH QUESTIONNAIRE - PHQ9
SUM OF ALL RESPONSES TO PHQ QUESTIONS 1-9: 3
10. IF YOU CHECKED OFF ANY PROBLEMS, HOW DIFFICULT HAVE THESE PROBLEMS MADE IT FOR YOU TO DO YOUR WORK, TAKE CARE OF THINGS AT HOME, OR GET ALONG WITH OTHER PEOPLE: NOT DIFFICULT AT ALL
SUM OF ALL RESPONSES TO PHQ QUESTIONS 1-9: 3

## 2022-03-09 LAB
C TRACH DNA SPEC QL NAA+PROBE: NEGATIVE
N GONORRHOEA DNA SPEC QL NAA+PROBE: NEGATIVE

## 2022-03-09 ASSESSMENT — PATIENT HEALTH QUESTIONNAIRE - PHQ9: SUM OF ALL RESPONSES TO PHQ QUESTIONS 1-9: 3

## 2022-03-09 ASSESSMENT — ANXIETY QUESTIONNAIRES: GAD7 TOTAL SCORE: 5

## 2022-03-10 LAB
BKR LAB AP GYN ADEQUACY: NORMAL
BKR LAB AP GYN INTERPRETATION: NORMAL
BKR LAB AP HPV REFLEX: NORMAL
BKR LAB AP PREVIOUS ABNORMAL: NORMAL
PATH REPORT.COMMENTS IMP SPEC: NORMAL
PATH REPORT.COMMENTS IMP SPEC: NORMAL
PATH REPORT.RELEVANT HX SPEC: NORMAL

## 2022-05-16 ENCOUNTER — TELEPHONE (OUTPATIENT)
Dept: FAMILY MEDICINE | Facility: CLINIC | Age: 22
End: 2022-05-16
Payer: COMMERCIAL

## 2022-05-16 DIAGNOSIS — Z32.01 PREGNANCY TEST POSITIVE: Primary | ICD-10-CM

## 2022-05-16 NOTE — TELEPHONE ENCOUNTER
"Reason for Call:  Other call back    Detailed comments: Patient wants to confirm pregnancy (PLEASE NOTE: \"Pregnancy complication\" was entered by mistake, couldn't delete the 'Reason for Call' entry.)    Phone Number Patient can be reached at: Home number on file [phone] (home)    Best Time: As soon as possible    Can we leave a detailed message on this number? NO    Call taken on 5/16/2022 at 10:53 AM by Laura Kanavel      "

## 2022-05-17 NOTE — TELEPHONE ENCOUNTER
Called pt and scheduled a lab only appt for Thursday, 05/19 at 130p.      Collin Mays Jr., CMA on 5/17/2022 at 9:43 AM

## 2022-05-19 ENCOUNTER — LAB (OUTPATIENT)
Dept: LAB | Facility: CLINIC | Age: 22
End: 2022-05-19
Payer: COMMERCIAL

## 2022-05-19 DIAGNOSIS — Z32.01 PREGNANCY TEST POSITIVE: Primary | ICD-10-CM

## 2022-05-19 LAB — HCG UR QL: POSITIVE

## 2022-05-19 PROCEDURE — 81025 URINE PREGNANCY TEST: CPT

## 2022-05-23 ENCOUNTER — TELEPHONE (OUTPATIENT)
Dept: MIDWIFE SERVICES | Facility: CLINIC | Age: 22
End: 2022-05-23
Payer: COMMERCIAL

## 2022-05-23 DIAGNOSIS — O26.859 SPOTTING IN EARLY PREGNANCY: ICD-10-CM

## 2022-05-23 DIAGNOSIS — O26.859 SPOTTING IN EARLY PREGNANCY: Primary | ICD-10-CM

## 2022-05-23 NOTE — TELEPHONE ENCOUNTER
PHONE NOTE: Patient calls CNM on-call.  She is planning to establish care with the midwives but has not made an appointment yet.  Phone numbers given and patient agrees to call the midwife  to make an IOB appointment.  Patient states that her last menstrual period was 3/18/2022.  She regularly has 33-day cycles and thinks that she ovulated sometime between 4/3/2022 to 4/7/2022.  This would put her at approximately 9 weeks 3 days gestation today.  She states that yesterday she had a small amount of light pink spotting on toilet paper only.  This was after walking for 3 to 4 hours.  She did not have any cramping no back pain no additional bleeding.  She has no symptoms today.  She only had a little bit of old brown blood on toilet paper this morning.  Again, no cramping, no back pain, no fever, no UTI symptoms no vaginal irritation, no other abnormal discharge.  Patient thinks she knows that her blood type is a positive blood type but this is not established at this time.  Patient understands that if she has any increase in bleeding we would need to get blood work to establish her blood type to see if she needs RhoGAM or not.  We discussed safe parameters of bleeding/spotting as well as other early pregnancy symptoms and when she should call/come in with any increase in symptoms-- any bright red bleeding, any cramping, pain, fever.  Reviewed phone numbers for both appointment line and the on-call midwife.  Patient agrees to call for her intake/IOB appointment.  Offered order for dating/viability ultrasound and patient was given phone numbers to schedule this.  Agrees with plan.

## 2022-05-23 NOTE — PLAN OF CARE
Noted. Problem: Patient Care Overview  Goal: Plan of Care/Patient Progress Review  Outcome: Adequate for Discharge Date Met: 12/15/17  [id number] Vital signs stable on room air. Disoriented to  and current year and month in the morning after awakening. In the late morning she was A/Ox's 4. Vision improved and able to read small print as well as things written on whiteboard as she reported having some blurred vision yesterday. Tolerated regular diet. Good PO fluid intake. Several episodes of tachycardia to 130's in the morning when upset and tearful. Mom, Dad and sister at bedside and involved in cares and plan of care. Ambulated independently in room and hallway. Showered. Headache reported, ibuprofen administered and headache resolved. Sitter maintained at bedside. Denied any thoughts of self harm or harm toward others. Plan is to transfer to unit 3C. Reviewed discharge AVS with mom and dad. Mom and dad verbalized understanding of all discharge instructions and verbalized agreement to discharge and going to unit 3C. Report given to Elma on 3C. Transferred to 3C at 1840.

## 2022-05-26 ENCOUNTER — HOSPITAL ENCOUNTER (OUTPATIENT)
Dept: ULTRASOUND IMAGING | Facility: HOSPITAL | Age: 22
Discharge: HOME | End: 2022-05-26
Attending: MIDWIFE | Admitting: MIDWIFE
Payer: COMMERCIAL

## 2022-05-26 DIAGNOSIS — O26.859 SPOTTING IN EARLY PREGNANCY: ICD-10-CM

## 2022-05-26 PROCEDURE — 76801 OB US < 14 WKS SINGLE FETUS: CPT

## 2022-05-27 ENCOUNTER — LAB (OUTPATIENT)
Dept: LAB | Facility: CLINIC | Age: 22
End: 2022-05-27
Payer: COMMERCIAL

## 2022-05-27 DIAGNOSIS — Z32.01 PREGNANCY EXAMINATION OR TEST, POSITIVE RESULT: ICD-10-CM

## 2022-05-27 DIAGNOSIS — Z32.01 PREGNANCY EXAMINATION OR TEST, POSITIVE RESULT: Primary | ICD-10-CM

## 2022-05-27 LAB — HCG SERPL-ACNC: ABNORMAL MLU/ML (ref 0–4)

## 2022-05-27 PROCEDURE — 84702 CHORIONIC GONADOTROPIN TEST: CPT

## 2022-05-27 PROCEDURE — 36415 COLL VENOUS BLD VENIPUNCTURE: CPT

## 2022-05-30 ENCOUNTER — HOSPITAL ENCOUNTER (EMERGENCY)
Facility: HOSPITAL | Age: 22
Discharge: HOME | End: 2022-05-30
Admitting: PHYSICIAN ASSISTANT
Payer: COMMERCIAL

## 2022-05-30 VITALS
BODY MASS INDEX: 34.16 KG/M2 | HEART RATE: 90 BPM | HEIGHT: 65 IN | RESPIRATION RATE: 12 BRPM | DIASTOLIC BLOOD PRESSURE: 81 MMHG | OXYGEN SATURATION: 98 % | SYSTOLIC BLOOD PRESSURE: 140 MMHG | WEIGHT: 205 LBS | TEMPERATURE: 98.4 F

## 2022-05-30 DIAGNOSIS — Z3A.01 LESS THAN 8 WEEKS GESTATION OF PREGNANCY: ICD-10-CM

## 2022-05-30 LAB — HCG SERPL-ACNC: ABNORMAL MLU/ML (ref 0–4)

## 2022-05-30 PROCEDURE — 84702 CHORIONIC GONADOTROPIN TEST: CPT | Performed by: PHYSICIAN ASSISTANT

## 2022-05-30 PROCEDURE — 36415 COLL VENOUS BLD VENIPUNCTURE: CPT | Performed by: PHYSICIAN ASSISTANT

## 2022-05-30 PROCEDURE — 99283 EMERGENCY DEPT VISIT LOW MDM: CPT

## 2022-05-30 NOTE — DISCHARGE INSTRUCTIONS
Follow up with your OB/GYN group this week to follow up on your HCG results, especially given you are leaving prior to receiving the results from the ER.  Your OB should walk through the interpretation of these results with you.    Return to the ER if you have heavy bleeding, abdominal pain, fevers.

## 2022-05-30 NOTE — ED PROVIDER NOTES
ED PROVIDER NOTE    EMERGENCY DEPARTMENT ENCOUNTER      NAME: Cassidy Fernandes  AGE: 21 year old female  YOB: 2000  MRN: [id number]  EVALUATION DATE & TIME: No admission date for patient encounter.    PCP: Alyssa Chin    ED PROVIDER: Genesis Cooper PA-C      Chief Complaint   Patient presents with     Labs Only         FINAL IMPRESSION:  1. Less than 8 weeks gestation of pregnancy          MEDICAL DECISION MAKING:    Pertinent Labs & Imaging studies reviewed. (See chart for details)  21 year old female presents to the Emergency Department for evaluation of pregnancy.  She presents solely for a hCG level check.    She had some spotting early last week and had hCG checked at clinic last Thursday which she reports was 18,000.  They were very specific that she needed it rechecked today and given the holiday she needed to come through the emergency department to have this checked.    She denies any abdominal pain, current bleeding, fevers.  Abdomen is soft and nontender and she looks well here.    hCG level sent.  Patient requested discharge prior to receiving these results and [facility] and follow-up with her OB/GYN for interpretation.    At the conclusion of the encounter I discussed the results of all of the tests and the disposition. The questions were answered. The patient or family acknowledged understanding and was agreeable with the care plan.       ED COURSE  10:16 AM  Met and evaluated patient. Discussed ED plan.   10:40 AM patient requested to discharge prior to the results.  She will plan on following up with her OB/GYN for the results and interpretation of these.      MEDICATIONS GIVEN IN THE EMERGENCY:  Medications - No data to display    NEW PRESCRIPTIONS STARTED AT TODAY'S ER VISIT  New Prescriptions    No medications on file          =================================================================    HPI    Patient information was obtained from:  Patient    Use of Intrepreter: N/A        Cassidy Fernandes is a 21 year old female , currently ~6wk pregnant, who presents to the ED for evaluation of hCG levels.     Patient reports she presented to the ER to get her levels of hCG rechecked. States her hCG level was 18,000 mlU/ml last Thursday (~4 days ago). Reports she is ~ 6 weeks pregnant. States she had some vaginal spotting last Monday(~7 days ago), which has resolved. She has been using the midwives at [facility] and did have some fertility difficulties.     Denies abdominal pain, nausea, vomiting, urinary symptoms, chest pain, shortness of breath or any other medical complaint at this time.       REVIEW OF SYSTEMS   Constitutional: Negative for fevers, chills.  Pulmonary: Negative for shortness of breath  Cardiac: Negative for chest pain  GI:Negative for nausea, vomiting, diarrhea, abdominal pain  : Negative for urinary symptoms    All other systems reviewed and are negative        PAST MEDICAL HISTORY:  Past Medical History:   Diagnosis Date     Contact dermatitis and other eczema, due to unspecified cause      Mononucleosis     with hospitalization     Uncomplicated asthma        PAST SURGICAL HISTORY:  History reviewed. No pertinent surgical history.        CURRENT MEDICATIONS:    No current facility-administered medications for this encounter.    Current Outpatient Medications:      acetaminophen (TYLENOL) 500 MG tablet, Take 1 tablet (500 mg) by mouth every 6 hours as needed for fever or pain, Disp: 30 tablet, Rfl: 0     albuterol (PROAIR HFA/PROVENTIL HFA/VENTOLIN HFA) 108 (90 Base) MCG/ACT inhaler, Inhale 2 puffs into the lungs every 6 hours as needed for shortness of breath / dyspnea or wheezing . No further refills until follow-up appointment, Disp: 18 g, Rfl: 0     ibuprofen (ADVIL/MOTRIN) 600 MG tablet, Take 1 tablet (600 mg) by mouth every 6 hours as needed for pain, Disp: 30 tablet, Rfl: 0     Prenatal Vit-Fe Fumarate-FA (PRENATAL  "MULTIVITAMIN W/IRON) 27-0.8 MG tablet, Take 1 tablet by mouth daily, Disp: 90 tablet, Rfl: 3    ALLERGIES:  No Known Allergies    FAMILY HISTORY:  Family History   Problem Relation Age of Onset     Cancer - colorectal Paternal Grandmother      Thyroid Disease Paternal Grandmother         unknown type     Personality Disorder Mother         borderline     Other Cancer Father         neuroendocrine pancreatic cancer     Hemochromatosis Maternal Grandmother      Suicide Maternal Grandfather      Depression Sister      Anxiety Disorder Sister      C.A.D. No family hx of      Diabetes No family hx of      Hypertension No family hx of      Cerebrovascular Disease No family hx of      Breast Cancer No family hx of      Prostate Cancer No family hx of        SOCIAL HISTORY:   Smoking: None  Alcohol: None    VITALS:  Vitals:    05/30/22 1003   BP: (!) 140/81   Pulse: 90   Resp: 12   Temp: 98.4  F (36.9  C)   TempSrc: Tympanic   SpO2: 98%   Weight: 93 kg (205 lb)   Height: 1.651 m (5' 5\")       PHYSICAL EXAM    General Appearance:  Alert, cooperative, no distress, appears stated age  HENT: Normocephalic without obvious deformity, atraumatic. Mucous membranes moist   Eyes: Conjunctiva clear, Lids normal. No discharge.   Respiratory: No distress.   GI: Abdomen soft, nontender  Musculoskeletal: Moving all extremities. No gross deformities  Integument: Warm, dry, no rashes or lesions  Neurologic: Alert and orientated x3. No focal deficits.  Psych: Normal mood and affect        LAB:  Labs Ordered and Resulted from Time of ED Arrival to Time of ED Departure - No data to display    RADIOLOGY:  No orders to display         IJose, am serving as a scribe to document services personally performed by Genesis Cooper PA-C based on my observation and the provider's statements to me. I, Genesis Cooper PA-C attest that Jose Montalvo is acting in a scribe capacity, has observed my performance of the services and has documented them in " accordance with my direction.    Genesis Cooper PA-C   Emergency Medicine       Genesis Cooper PA-C  05/30/22 105

## 2022-05-30 NOTE — ED TRIAGE NOTES
Pt is 5 weeks pregnant with an edc of 1/21/23, Pt had an ultrasound  On  Thursday with some concerns per her OB dr so she hadan HCG level on Friday and she needs another HCG level today. Her ob clinic is closed today so she needed to come here.  No cramping or vagginal bleeding.

## 2022-06-01 ENCOUNTER — PRENATAL OFFICE VISIT (OUTPATIENT)
Dept: MIDWIFE SERVICES | Facility: CLINIC | Age: 22
End: 2022-06-01
Payer: COMMERCIAL

## 2022-06-01 VITALS
DIASTOLIC BLOOD PRESSURE: 66 MMHG | OXYGEN SATURATION: 98 % | HEART RATE: 91 BPM | BODY MASS INDEX: 34.49 KG/M2 | HEIGHT: 65 IN | WEIGHT: 207 LBS | SYSTOLIC BLOOD PRESSURE: 122 MMHG

## 2022-06-01 DIAGNOSIS — Z32.01 PREGNANCY EXAMINATION OR TEST, POSITIVE RESULT: ICD-10-CM

## 2022-06-01 DIAGNOSIS — Z34.01 ENCOUNTER FOR SUPERVISION OF NORMAL FIRST PREGNANCY IN FIRST TRIMESTER: Primary | ICD-10-CM

## 2022-06-01 LAB
ABO/RH(D): NORMAL
ANTIBODY SCREEN: NEGATIVE
ERYTHROCYTE [DISTWIDTH] IN BLOOD BY AUTOMATED COUNT: 11.8 % (ref 10–15)
HBV SURFACE AG SERPL QL IA: NONREACTIVE
HCG SERPL-ACNC: ABNORMAL MLU/ML (ref 0–4)
HCT VFR BLD AUTO: 37.2 % (ref 35–47)
HCV AB SERPL QL IA: NEGATIVE
HGB BLD-MCNC: 12.8 G/DL (ref 11.7–15.7)
HIV 1+2 AB+HIV1 P24 AG SERPL QL IA: NEGATIVE
MCH RBC QN AUTO: 31.4 PG (ref 26.5–33)
MCHC RBC AUTO-ENTMCNC: 34.4 G/DL (ref 31.5–36.5)
MCV RBC AUTO: 91 FL (ref 78–100)
PLATELET # BLD AUTO: 220 10E3/UL (ref 150–450)
RBC # BLD AUTO: 4.08 10E6/UL (ref 3.8–5.2)
SPECIMEN EXPIRATION DATE: NORMAL
SPECIMEN EXPIRATION DATE: NORMAL
WBC # BLD AUTO: 8.2 10E3/UL (ref 4–11)

## 2022-06-01 PROCEDURE — 86901 BLOOD TYPING SEROLOGIC RH(D): CPT | Performed by: ADVANCED PRACTICE MIDWIFE

## 2022-06-01 PROCEDURE — 84702 CHORIONIC GONADOTROPIN TEST: CPT | Performed by: ADVANCED PRACTICE MIDWIFE

## 2022-06-01 PROCEDURE — 87086 URINE CULTURE/COLONY COUNT: CPT | Performed by: ADVANCED PRACTICE MIDWIFE

## 2022-06-01 PROCEDURE — 87389 HIV-1 AG W/HIV-1&-2 AB AG IA: CPT | Performed by: ADVANCED PRACTICE MIDWIFE

## 2022-06-01 PROCEDURE — 86780 TREPONEMA PALLIDUM: CPT | Performed by: ADVANCED PRACTICE MIDWIFE

## 2022-06-01 PROCEDURE — 99215 OFFICE O/P EST HI 40 MIN: CPT | Performed by: ADVANCED PRACTICE MIDWIFE

## 2022-06-01 PROCEDURE — 86762 RUBELLA ANTIBODY: CPT | Performed by: ADVANCED PRACTICE MIDWIFE

## 2022-06-01 PROCEDURE — 99207 PR FIRST OB VISIT: CPT | Performed by: ADVANCED PRACTICE MIDWIFE

## 2022-06-01 PROCEDURE — 86803 HEPATITIS C AB TEST: CPT | Performed by: ADVANCED PRACTICE MIDWIFE

## 2022-06-01 PROCEDURE — 86850 RBC ANTIBODY SCREEN: CPT | Performed by: ADVANCED PRACTICE MIDWIFE

## 2022-06-01 PROCEDURE — 85027 COMPLETE CBC AUTOMATED: CPT | Performed by: ADVANCED PRACTICE MIDWIFE

## 2022-06-01 PROCEDURE — 36415 COLL VENOUS BLD VENIPUNCTURE: CPT | Performed by: ADVANCED PRACTICE MIDWIFE

## 2022-06-01 PROCEDURE — 86900 BLOOD TYPING SEROLOGIC ABO: CPT | Performed by: ADVANCED PRACTICE MIDWIFE

## 2022-06-01 PROCEDURE — 87340 HEPATITIS B SURFACE AG IA: CPT | Performed by: ADVANCED PRACTICE MIDWIFE

## 2022-06-01 ASSESSMENT — EDINBURGH POSTNATAL DEPRESSION SCALE (EPDS)
I HAVE BEEN ABLE TO LAUGH AND SEE THE FUNNY SIDE OF THINGS: AS MUCH AS I ALWAYS COULD
I HAVE BEEN ANXIOUS OR WORRIED FOR NO GOOD REASON: NO, NOT AT ALL
THE THOUGHT OF HARMING MYSELF HAS OCCURRED TO ME: NEVER
I HAVE BEEN SO UNHAPPY THAT I HAVE BEEN CRYING: NO, NEVER
I HAVE BLAMED MYSELF UNNECESSARILY WHEN THINGS WENT WRONG: NOT VERY OFTEN
I HAVE BEEN SO UNHAPPY THAT I HAVE HAD DIFFICULTY SLEEPING: NOT AT ALL
TOTAL SCORE: 2
THINGS HAVE BEEN GETTING ON TOP OF ME: NO, MOST OF THE TIME I HAVE COPED QUITE WELL
I HAVE FELT SAD OR MISERABLE: NO, NOT AT ALL
I HAVE LOOKED FORWARD WITH ENJOYMENT TO THINGS: AS MUCH AS I EVER DID
I HAVE FELT SCARED OR PANICKY FOR NO GOOD REASON: NO, NOT AT ALL

## 2022-06-01 NOTE — PATIENT INSTRUCTIONS
"Welcome to [facility]   and thank you for choosing us for your maternity care provider!  Congratulations!      [employer]  After each of your visits you are welcome to check [place] for your visit summary including education and links to information relevant to your pregnancy and/or well woman care.   Find the \"Visits\" tab at the top of the page, you will see a list of recent visits and for each visit a for link for \"View After Visit Summary.\" View of your After Visit Summary will allow you to read our recommendations from your visit, review any education provided, and link to websites with useful information.   If you have any questions or difficulty navigating Pretty in my Pocket (PRIMP), please feel free to contact us and we will do our best to direct you.  Meet the Midwives from [facility]  You are invited to an informational meet and greet with [facility]'s [facility] Certified Nurse-Midwives. Our free \"Meet the Midwives\" event is a great opportunity to learn about our midwives' philosophy and experience, the hospitals where we can assist with your birth, and answer questions you may have. Partners, friends, and family are welcome to attend. Currently, this is a virtual event.  Date  First Tuesday of every month at 7 pm.    Link to next (live) meeting  [email or web address]  To Join by Telephone (audio only) Call:   [phone] Phone Conference ID: [phone]#    Contact information:  Appointment line and to get a hold of CNM in clinic Monday-Friday 8 am - 5 pm:  [phone].  There are some clinics with early start times (1st appointment 7:40 am) and others with evening hours (last appointment 6:20 pm).  Most are typically open from 8 am to 5 pm.    CNM on call answering service: [phone].  Specify your hospital of choice and leave a brief message for CNM;  will then page CNM who is on call " at your specified hospital and you should receive a call back with 15 minutes.  Be sure that your ringer is audible and that you can accept blocked calls so that we can get back in touch with you! This number should be reserved for urgent needs if during the day, before 8 am, after 5 pm, weekends, holidays.      Pregnancy: Body Changes  From conception (fertilization) until after the birth of your child, you and your baby will change every day. To help you understand what is happening, we ve outlined how pregnancy begins and some of the changes you may notice.  How Pregnancy Begins  Conception is the union of a sperm and an egg. When it occurs, your baby s genetic makeup is complete, even its sex. Fertilization takes place in the fallopian tube. The fertilized egg then travels down this tube to the uterus (womb). The egg attaches to the lining of the uterus about a week later. There it grows and is nourished.    Your Changing Body  Pregnancy affects almost every part of your body. You may notice some of the following physical and emotional changes:  Your uterus expands outward and upward as your baby grows. You may feel pressure on your bladder, stomach, and other organs.  You may notice skin color changes on your forehead, nose, and cheeks. A dark line may form from your bellybutton down to your pubic area. The skin color around your nipples and thighs may also change.  Pink stretch marks may appear on your abdomen, breasts, or hips.  Your hair may seem thicker. You lose less hair during pregnancy.  You may feel fine one day and weepy the next. This is caused by changes in your body, such as increased hormones (chemicals that affect the function of certain organs and also your moods).      Adapting to Pregnancy: First Trimester  As your body adjusts, you may have to change or limit your daily activities. You ll need more rest. You may also need to use the energy you have more wisely.  Eat stomach-friendly foods  like cottage cheese, crackers, or bread throughout the day.    Your Changing Body  Almost every part of your body is affected as you adapt to pregnancy. The uterus and cervix will begin to soften right away. You may not look very pregnant during the first three months. But you are likely to have some common signs of early pregnancy:  Nausea  Fatigue  Frequent urination  Mood swings  Bloating of the abdomen  Missed or light periods (first trimester bleeding)  Nipple or breast tenderness, breast swelling    It s Not Too Late to Start Good Habits  What matters most is protecting your baby from this moment on. If you smoke, drink alcohol, or use drugs, now is the time to stop. If you need help, talk with your health care provider.  Smoking increases the risk of stillbirth or having a low-birth-weight baby. If you smoke, quit now.  Alcohol and drugs have been linked with miscarriage, birth defects, intellectual disability, and low birth weight. Do not drink alcohol or take drugs.    Tips to Relieve Nausea  Although nausea can occur at any time of the day, it may be worse in the morning. To help prevent nausea:  Eat small, light meals at frequent intervals.  Get up slowly. Eat a few unsalted crackers before you get out of bed.  Drink water with lemon slices.  Eat an ice pop in your favorite flavor.  Ask your health care provider about taking kiah or vitamin B6 for nausea and vomiting.  Talk with your health care provider if you take vitamins that upset your stomach.    Work Concerns  The end of the first trimester is a good time to discuss working during pregnancy with your employer. Follow your health care provider s advice if your job requires you to stand for a long time, work with hazardous tools, or even sit at a desk all day. Your workspace, workload, or scheduled hours may need to be adjusted. Perhaps you can change body postures more often or take an extra break.  Advice for Travel  Talk to your health care  provider first, but the second trimester may be the best time for any travel. You may be advised to avoid certain trips while you re pregnant. Food and water can be concerns in developing countries. Travel by car is a good choice, as you can stop, get out, and stretch. Bring snacks and water along. Fasten the lap belt below your belly, low over your hips. Also be sure to wear the shoulder harness.  Intimacy  Unless your health care provider tells you to, there is no reason to stop having sex while you re pregnant. You or your partner may notice changes in desire. Desire may be less in the first trimester, due to nausea and fatigue. In the second trimester, sex may be very enjoyable. The third trimester can be a challenge comfort-wise. Try different positions and see what s best for you both.      Pregnancy: Your First Trimester Changes  The first trimester is a time of rapid development for your baby. Because your baby is growing so quickly, it is important that you start a healthy lifestyle right away. By the end of the first trimester, your baby has formed all of its major body organs and weighs just over an ounce.    Month 1 (Weeks 1-4)  The placenta (the organ that nourishes your baby) begins to form. The heart and lungs begin to develop. Your baby is about 1/4 inch long by the end of the first month.    Month 2 (Weeks 5-8)  All of your baby s major body organs form. The face, fingers, toes, ears, and eyes appear. By the end of the month, your baby is about 1 inch long.    Month 3 (Weeks 9-12)  Your baby can open and close its fists and mouth. The sexual organs begin to form. As the first trimester ends, your baby is about 4 inches long.      Pregnancy: Your Weight  Being a healthy weight is important for both you and your baby. The weight you gain now is not just extra fat. It is also the weight of your baby. And it is the increased blood and fluids to support the baby. A slow, steady rate of gain is best. How  much you should gain depends on your weight before getting pregnant. Check with your health care provider to find out what is right for you.    If You Gain Too Much  Gaining too much weight might cause you to feel tired or you could have a harder pregnancy or birth. If you and your health care provider decide you re gaining too much:  Eat fewer fats and sugars. Instead, eat fruit, vegetables, and whole-grain foods.  Drink plenty of water between meals.  Get at least 20 minutes of light exercise, such as walking, each day.  Don t diet. You might not get enough of the nutrients you or your baby needs.  Keep a diet diary to help you gauge what and how much you are eating .    If You re Not Gaining Enough  If you don t gain enough, your baby could be too small or have health problems. Women tend to gain most of their weight in the second and third trimesters. For now:  Eat many types of foods. Make sure you get enough calcium, protein, and carbohydrates.  Don t skip meals.  Eat healthy snacks.  Pick nutrient-dense, high calorie healthy food like trail mix or protein shakes.  See a dietitian for help.  Talk to your healthcare provider if you have had an eating disorder or problems with certain foods.      Pregnancy: Common Questions  There are plenty of myths and  old wives  tales  surrounding pregnancy. You may need help  fact from fiction. On this sheet, you ll find answers to a few common questions. If you have other questions, talk with a midwife.    Will Working Harm My Baby?  In most cases, working throughout your pregnancy is not harmful at all. There may be concerns if the job involves dangerous machinery or chemicals, lifting, or standing for very long periods of time. Talk to your health care provider and employer about your particular job and pregnancy.  Why Can t I Change the Cat Litter Box?  Cats carry a disease called toxoplasmosis. In adult humans, it shows up as a mild infection of the blood and  organs. If you are infected during pregnancy, the baby s brain and eyes could be damaged. To be safe, have someone else change the litter. If you must handle it, wear a paper mask over your nose and mouth. Also, wear gloves and wash your hands afterward.  Which Medications Are Safe?  No prescription or over-the-counter drug is safe for everyone all of the time. But sometimes medications are needed. Be sure your health care provider knows you are pregnant. Then use only the medications he or she advises you to take. Please refer to the below resources for further information and discuss concern and questions with your midwife.  Is It True That I Can Overheat My Baby?  Yes. To avoid making your baby too warm:  Don t sit in a jacuzzi. A long, warm bath is fine, but not in water over 100 F.  Exercise less intensely if you feel fatigued. Base your workout on how you feel, not your heart rate. Heart rates aren t a good way to measure effort during pregnancy.  Can I Lift and Carry Safely?  Yes, if your health care provider doesn t tell you otherwise. Learn to lift and carry safely to avoid injury and reduce back pain during pregnancy. To protect your back:  Bend at the knees to bring the load nearer.  Get a good . Test the weight of the load.  Tighten your abdomen. Exhale as you lift.  Lift with your legs, not with your back.  Carry the load close to your body.  Hold the load so you can see where you are going.  What If I Get Sick?  Most women get sick at least once during pregnancy. Talk with your health care provider if you do. Most likely it will not affect your pregnancy. Get plenty of rest and fluids, and eat what you can. Talk to your health care provider before taking any medications.        HEALTHY PREGNANCY CARE: 10-14 WEEKS PREGNANT     By weeks 10 to 14 of your pregnancy, the placenta has formed inside your uterus. It may be possible to hear your baby's heartbeat with a doppler ultrasound device. Your baby's  eyes can and do move. The arms and legs can bend.    GENETIC SCREENING OPTIONS AT [facility]              All testing is optional. We don t recommend or discourage any test; it is totally up to you and your partner. Some couples wish to know their risk of having a baby with a genetic defect and others do not. We will support your decision. Abnormal results may lead to a discussion of options for further testing.  Accurate dating of your pregnancy is important for all testing so an ultrasound may be done prior to referral or testing.  No testing provides certainty; there are false positives and negatives associated with all testing, some more than others.  Most genetic testing is non-invasive (requires only a blood sample and sometimes an ultrasound or both).  It is always wise to check with your insurance carrier before proceeding.  Some testing can be done at our lab and some require a referral.  If you decide to do no testing, the 20 week ultrasound scan, which is a routine or standard ultrasound, has some ability to detect abnormalities in the baby, and identifies obstetric problems.  If you are over 35, you will have the option of a Level II ultrasound, which is a more detailed and targeting scan, that helps detect fetal anomalies as well as obstetric problems.    If you have a more complex family history of chromosomal abnormalities, a referral to a genetic counselor and/or a Maternal Fetal Medicine specialist, to help identify available tests, may be recommended.    TYPES OF GENETIC TESTING AVAILABLE INCLUDE:    Carrier Screening/Testing for Genetic Conditions  There are many inherited conditions for which testing or carrier testing is available. Carrier screening can test for conditions like Cystic Fibrosis, Thalassemia, Tj Sachs, Sickle Cell, Hemophilia, Muscular Dystrophy, [place] s disease and many others.   Cystic Fibrosis (CF) affects both males and females and people from all racial and ethnic  groups. However, the disease is most common among Caucasians of Northern  descent. CF is also common among Latinos and American Indians. The disease is less common among  Americans and  Americans. More than 10 million Americans are carriers of a faulty CF gene and many of them don't know that they are CF carriers. One or both parents can be tested any time before or during pregnancy.  Talk to your care provider about your family history and whether you should be screened. A referral to a genetic counselor at [facility] or [facility] can be made by your care provider at any time.  This is also tested for in the  Metabolic Screen that your infant receives 24 hours after birth.     Fetal DNA cell Testing: [place] or Innatal (Non-Invasive Prenatal Testing)  At 10 wk or greater, a blood sample can be drawn here at clinic or at any of our referral offices. It will provide highly accurate results with low false positive rates for trisomy 18, trisomy 21 (Down Syndrome), and trisomy 13 (> 99% trisomy detection rate at a false positive rate of <0.1%). Gender identification can also be obtained if desired (98% accuracy).  Can also detect some sex-linked chromosomal abnormalities (80-90% accuracy).  This is often done if one of the other screening tests is abnormal.        1st Trimester Screening:   Everyone has an age related risk of having a baby with a genetic abnormality. This testing provides a risk profile which is better than assigning risk based solely on your age.  Refines your risk of having a baby with a chromosomal abnormality such as Trisomy 21 & 18.  This test with detect a fetus with one of these disorders about 85% of the time.  A thickened nuchal fold can also be associated with cardiac defects.  This test requires a blood collection combined with ultrasound to obtain a measurement of fluid at back of baby s neck (nuchal translucency).  False positive results occur  approximately 5% of cases.  An additional blood sample may be necessary in order to calculate your risk of having a baby with a neural tube defect (e.g. spina bifida).  This is called the AFP test (alpha fetal protein).  An ultrasound should be able to  any spinal defect as well.  Follow-up of an abnormal test may include a more extensive ultrasound study and you may be offered an amniocentesis (a small sample of amniotic fluid is withdrawn and studied) for a definitive diagnosis    Quad Screen (4-marker screen)  Between 15 and 21 weeks, a sample of blood can be drawn at our lab to assess your risk of having a baby with Down Syndrome, Trisomy 18 and neural tube defects. Such testing is able to detect these conditions in 80% of cases and the false-positive rate is approximately 5%.   Follow-up of an abnormal test may include a more extensive ultrasound study and you may be offered an amniocentesis (a small sample of amniotic fluid is withdrawn and studied) for a definitive diagnosis      Referrals opportunities include:  [facility],  [facility]  Offers nuchal translucency ultrasound; does not offer genetic counseling.  [place]  [facility] and [facility] ([facility]):   Approximately an hour long visit includes 30 min with genetic counselor who discusses all testing available and which ones might be beneficial to you based on age, personal and family history.    Blood will be drawn and the nuchal translucency ultrasound will be discussed and performed if desired. The Free Fetal DNA testing ([place]/[employer]) can be drawn also.  Targeted or detailed Level II ultrasounds are also available with these perinatology groups.        Breastfeeding: a Healthy Option for You and Your Baby  Consider breastfeeding for the healthiest way to feed your baby. Ask your midwife or physician for more information.     The choice of how you will feed your baby is  important.  Before your baby s birth, you ll want to learn about the benefits of breastfeeding.  [facility] have been designated Baby Friendly; an initiative that was created by the World Health Organization and UNICEF.  This helps give you and your baby the best start in feeding their baby.    Why should I breastfeed my baby?   Babies are less likely to develop childhood obesity or diabetes   Babies are less likely to suffer from recurrent ear infections   Babies are less likely to be hospitalized for respiratory conditions   Breast milk is rich in nutrients and antibodies-it is easy to digest    How does it benefit me?   Lowers the risk for diabetes, breast and ovarian cancer and postpartum depression   Moms can lose  baby weight  more quickly   Cost savings - formula can cost well over $1,500 per year   Convenient - no bottles and nipples to sterilize, no measuring and mixing formula   The physical contact with breastfeeding can make babies feel secure, warm and comforted     What about formula?  While you and your baby are staying with us at [facility], we will support whatever feeding choice you make for your baby.    Some important considerations:    The American Academy of Pediatrics, the World Health Organization, and many more organizations recommend exclusive breastfeeding for 6 months and continued breastfeeding while adding other foods for the first 1-2 years.    Any amount of breastmilk has benefits to both baby and mother.  Giving formula in replacement of breastfeeding can affect mother s milk supply.  If formula is needed, hospital staff will work with you on a plan to help develop your milk supply.  Formula alters the natural growth of good bacteria in the  stomach.   Research has found that first time mothers who offer formula in the hospital have a shorter duration of breastfeeding.    How can I start to prepare?   Start by having a conversation with your medical provider.   Talk  with your partner, family and friends.   Attend a prenatal class that includes breastfeeding preparation. Birth and breastfeeding classes are offered by [facility]. Visit [employer] for class information.   After your baby s birth, hospital staff and lactation consultants will help you and your baby get off to a great start with breastfeeding.    As your center of gravity and weight changes, use good body mechanics when changing positions and lifting. For example, use a straight back and your legs for support when lifting instead of bending over. Maintain good posture to prevent straining your muscles. Now is a good time to continue or restart your exercise program. Walking 30-60 minutes daily is an excellent way to keep fit. Yoga and swimming also offer many benefits.    The nausea and fatigue of early pregnancy have usually started to let up, so this is a good time to focus on nutrition. Consider attending a nutrition class. A healthy diet includes about 60 grams of protein each day (3-4 servings of dairy, 2-3 servings of meat/fish/poultry/nuts), 4-6 servings of whole grain foods, and 5-6 servings of fruits and vegetables. Remember to drink 6-8 glasses of water daily.    Watch for warning signs, such as   vaginal bleeding  fluid leaking from your vagina  severe abdominal pain  nausea and vomiting more than 4-5 times a day, or if you are unable to keep anything down  fever more than 100.4 degrees F.       RESOURCES   You can refer to the Starting Out Right book or find it online at [email or web address] or [email or web address]/[email or web address]-[email or web address]-out-[email or web address]#[email or web address]    You can sign up for a weekly parenting e-mail that gives support, tips and advice from health care professionals that starts with pregnancy and continues through the toddler years. To register, go to  [email or web address] at any time during your pregnancy.    Breastfeeding:    OUTPATIENT LACTATION RESOURCES     -Schedule an appointment with a [facility] CN who is also a Lactation Consultant by calling [phone]. We see women for breastfeeding visits at [facility] and [facility].     Chocolate Milk Club:  [email or web address]  Join the Facebook group or join us for support on the first Monday of each month from 7 to 9 p.m.  , Dr. Lucas Nichole, DNP, CNM, CNP, IBCLC, ICEA  Phone: [phone]  Fax: [phone]  Email: [email or web address]    R.O.S.E. = Reaching our Sisters Everywhere  [email or web address]    Black Women Do Breastfeed Blog  www.[email or web address]    Club Mom breastfeeding support for Black mothers:  Contact Anuj Trejo  Phone: [phone]   Email:  [email or web address].     Brii De Paz  Phone: [phone]   Email:  [email or web address] parent support for Black fathers:   Contact Chase Kathleen   Phone: [phone]   Email:  [email or web address].    The [employer] Breastfeeding Coalition is a wonderful support for [facility] women who are breastfeeding.  They are best found on Facebook.      The [employer] has produced a collection of video stories about breastfeeding in the ong community, produced by the [employer].  Most are in English, but one on handling breastmilk is in ong.  The video collection is in the middle of the page.    This page also has several other resources on Hmong breastfeeding.     https://[email or web address]/communities/    WIC program application: Nawaf Marlon   Https://[email or web address]/[email or web address]?v=lQoWwPoyGYc    For information on Local WIC services call:  [phone]    You may qualify...  If you are pregnant, nursing, or  have a child under age 5, we encourage you to Apply for WIC.    WIC Provides...  Nutrition tips and advice  Support for breastfeeding  Healthy foods like fresh fruits and vegetables, whole grain cereals, bread and tortillas, low fat milk, and baby foods  Caring and supportive staff  Don't delay! We're here to help!  CALL TODAY FOR A WIC CLINIC NEAR YOU  [phone] or [phone]    Resource/WIC/[facility] health improvement partnership:     -Baby Café  Find a Baby Café - Baby Café [place] (babycafeusa.Covestor)   Search by State ([place]) to find the nearest cafe to you      [facility] Baby Café  Due to COVID-19, all Baby Café sessions are canceled until further notice. For lactation support, please contact one of our bilingual staff:  Audra (IBCLC) [phone]  Radha (IBCLC/ Israeli) [phone]  Marzena (Hmong) [phone]  Gomez (Congolese) [phone]  Baby Café is a free, drop-in service offering breastfeeding/chestfeeding support. Come share tips and socialize with other pregnant, breastfeeding/chestfeeding families. Babies and siblings are welcome (no  available).  We offer:  Professionally trained lactation staff.  Resource books for lending.  Relaxed and fun atmosphere.  Refreshments.  Locations  Baby Café is offered at several locations.  Please see below for the Baby Café closest to you.  CANCELED UNTIL FURTHER NOTICE  [facility]  [address], [facility]  1st Wednesdays of the month   10 a.m. - Noon  CANCELED UNTIL FURTHER NOTICE  [facility] [address], [place], [address]  4th Wednesdays of the month   10 a.m. - 12:30 p.m.     CANCELED UNTIL FURTHER NOTICE  [facility]  [address], [place], [address]  4th Wednesdays of the month  4 - 6 p.m.  CANCELED UNTIL FURTHER NOTICE  [facility]  [address], [place], [facility]  2nd Thursdays of the month.  9:30-11:30 a.m.  Enter through the east end of the  building, the blue Door C.  Ring the ECFE buzzer to be let in.   More information  Radhafabiola Cantor  [phone]  [email or web address].     -Attend a baby weigh in at [facility].  Lactation consultants are available to answer questions  [place]: Tuesdays 1:00 - 2:00  [facility], [facility]: Mondays 1:00 - 2:00   www.PlayMobs.8020select    -Attend one of the New Mama groups at [facility] in [facility].  [facility] also offers one-on-one in home and in office lactation consults.   www.[facility] ServicesTodaytickets    -Attend a LeLeche League meeting.  Multiple groups in several locations throughout the [facility]. The meetings are no-cost and always informative breastfeeding education session through [employer] La Leche Leginna  Www.lllondas.org/     Held at [facility] the second Thursday of each month at 7pm    Childbirth and Parenting Education:       Everyday Miracles:   https://www.everyday-miracles.org/    Free Video Series from [facility]: https://nursing.[facility]/academics/specialty-areas/nurse-midwifery/having-baby-prenatal-videos/having-baby-prenatal-and    [facility]: http://[facility] HealthFundation/   ([phone] 539-GZMC  [place]: (education, yoga & wellness) www.StormWind  [facility]: www.ticketeaHCA [facility] Avent[facility] HospitalTodaytickets   Childbirth collective: (Parent topic nights)  www.childbirthcollective.org/  Hypnobabies:  www.hypnobabiestwincities.com/  Hypnobirthing:  Http://hypnobirthing.com/  The Birth Hour: https://thebirthhour.8020select/online-childbirth-class/    APPS and Podcasts:   Ping López Nurture    Evidence Based Birth  The Birth Hour (for birth stories)   Birthful   Expectful   The Longest Shortest Time  PregnancyPodcast Briseida Leroy    Book Recommendations:   Angela Fairbanks's Birthing From Within--first few chapters include a new-age tone, you may prefer to skip it and keep going, because there is good stuff later.  This book recommendation covers emotional preparation,  "but does cover coping with pain, and use of both pharmacological and nonpharmacological methods.    Dr. Barger' The Pregnancy Book and The Birth Book--the pregnancy book goes month-by month      The Birth Partner by Christie Liu    Womanly Art of Breastfeeding by La Leche League International   Bestfeeding by Elma Grossman--great pictures    Mothering Your Nursing Toddler, by Nazia Cramer.   Addresses dealing with so many of the challenging behaviors of a nursing toddler.  How Weaning Happens, by La Leche League.  Discusses weaning at all ages, from medically necessary weaning of an infant, all the way up to age 5 (or older), with why/why not, and strategies.  Very empowering book both for deciding to wean and deciding not to.    American College of Nurse-Midwives (ACNM) http://www.midwife.org/; look at the informational handouts at http://www.midwife.org/Share-With-Women     www.mymidwife.org    Mother to Baby (Medication and Herbal guidance in pregnancy): http://www.mothertobaby.org  Toll-Free Hotline: 650.551.8501  LactMed (Medication use while breastfeeding): http://toxnet.nlm.nih.gov/newtoxnet/lactmed.htm    Women's Health.gov:  http://www.womenshealth.gov/a-z-topics/index.html    American pregnancy association - http://americanpregnancy.org    Centering Pregnancy (group prenatal care option): http://centeringhealthcare.org    Information about doulas:  Childbirth collective: http://www.childbirthcollective.org/  Doulas of [place] (LATHA):  www.latha.org  [facility]  project: http://twincitiesdoulaproject.com/     Early Childhood and Family Education (ECFE):  ECFE offers parents hands-on learning experiences that will nourish a lifetime of teachable moments.  http://ecfe.info/ecfe-home/    March of Dimes www.[employer]     FDA - Nutrition  www.mypyramid.gov  Under \"For Consumers,\" click on \"pregnant and breastfeeding women.\"      Centers for Disease Control and Prevention (CDC) - Vaccines " : http://www.cdc.gov/vaccines/       When researching information on the web, question the validity of websites.  The Cigital .gov, .edu and.org tend to be more reliable information.  If there are a lot of advertisements, be cautious of the information provided. Stay away from blogs and chat rooms please!      Nutrition & supplements:     Prenatal vitamin (those with 600-1000 mcg folic acid and 27 mg of iron are enough).  Take with food or Juice     4-5 servings of dairy or other calcium rich foods (fish, leafy greens, soy) per day - if not, take 500-1000 mg additional calcium (Tums, pills, chews). Take with dairy     Vitamin D3 3688-7039 IU geltab daily.  Take with fattiest meal.  Look for fortified foods also (Dairy, Juice)     2-3 (4) oz servings of fish, seafood, nuts (walnuts & almonds), oils, avocado per week - if not, take Omega 3 Fatty acids: DHA & CAROLINE 4460-1250 mg per day.  Other names: cod liver oil, fish oil. Take with fattiest meal.  Some prenatals have DHA, but typically not a sufficient dose.    Fish: Do not eat shark, swordfish, taniya mackerel, or tilefish when you are pregnant or breastfeeding.  They contain high levels of mercury.  Limit white (albacore) tuna to no more than 6 ounces per week. Http://www.fda.gov/downloads/ForConsumers/ConsumerUpdates/ZLP648667.pdf         Touring the Maternity Care Center  At this time we are offering a virtual tour of the Maternity Care Centers at both [facility] and [facility]:   [facility]: https://www.[employer].org/Locations/[facility]/Maternity-Care-Center  [facility]:   https://[employer].org/overarching-care/the-birthplace/tours  https://www.[employer].org/Locations/[facility]/Maternity-Care-Center/#virtual_tour  When in person tours become available, registrations is required. To schedule a tour at either [facility] or [facility], please do so online using the following links:  [facility]  https://www.onlineregistrationcenter.com/registerlist.asp?[email or web address]&[email or web address]&[email or web address]&[email or web address]&[email or web address]&[email or web address]&it=1&byv=181  [facility] - https://www.onlineregistrationcenter.com/registerlist.asp?s=[email or web address]&[email or web address]&[email or web address]&[email or web address]&[email or web address]&ps=1&group=38&it=1&csv=646     You are invited to  Meet the [facility] Nurse Midwives [facility]    Virtual:   https://www.[facility].org/classes-and-events/meet-the-midwives-from-[facility]    A way to tour the hospital Labor and Delivery unit and meet the midwives in our group was postponed at the start of hospital restrictions following COVID-19. We will resume these when able.    Please call [phone] for ongoing updates.

## 2022-06-01 NOTE — PROGRESS NOTES
IOB       SUBJECTIVE:     Cassidy Fernandes is a 21 year old  here today for her first obstetrical exam at 6.4 weeks by early dating ultrasound. Here alone. This pregnancy is planned, but happened much faster than expected!  FOB same partner from past six years. The patient reports nausea, but no vomiting, fatigue and some mild breast tenderness. She has a certain LMP Patient's last menstrual period was 2022 (exact date)., predicting an Estimated Date of Birth: 23. Last period was normal. Her pregnancy is dated by early ultrasound.  Normal cycle length 30-34 days.    The patient states that she is in a monogamous relationship and states that she is safe. Offered GC/CT screening today and patient declines, had recent negative testing in March.      Risk factors: pre-pregnancy obesity. Pregnancy Risk Factors: None      VTE antepartum risk factors (two or more risk factors, or 1 * risk factor, places patient at higher risk): BMI 35-39, current and none.      Clinical history/risk factors requiring antepartum OB consult: none.      Exercise: Walking with job, carrying boxes, go to gym once per week.  Run and lift weights.    Safety (seatbelt, gun access, IPV, hx abuse):  No abuse or gun access, wears safety belt and instructed on use  Tobacco/Alcohol/Drug Use: none  EPDS today: , 0 to #10  Sexual Partners: one male   Last Breast Exam: Never    Social History:    Education level: High School    Occupation: [employer]    Partners name: Javy           OB History    Para Term  AB Living   1 0 0 0 0 0   SAB IAB Ectopic Multiple Live Births   0 0 0 0 0      # Outcome Date GA Lbr Carlos Eduardo/2nd Weight Sex Delivery Anes PTL Lv   1 Current                  History:      Past Medical History:   Diagnosis Date     Contact dermatitis and other eczema, due to unspecified cause      Depressive disorder      Herpes simplex virus (HSV) infection      Migraines      Mononucleosis     with hospitalization      Uncomplicated asthma          History reviewed. No pertinent surgical history.      Family History   Problem Relation Age of Onset     Personality Disorder Mother         borderline     Other Cancer Father         neuroendocrine pancreatic cancer     Hemochromatosis Maternal Grandmother      Breast Cancer Maternal Grandmother      Suicide Maternal Grandfather      Cancer - colorectal Paternal Grandmother      Thyroid Disease Paternal Grandmother         unknown type     Depression Sister      Anxiety Disorder Sister      No Known Problems Sister      No Known Problems Sister      No Known Problems Maternal Half-Brother      C.A.D. No family hx of      Diabetes No family hx of      Hypertension No family hx of      Cerebrovascular Disease No family hx of      Prostate Cancer No family hx of          Social History     Tobacco Use     Smoking status: Passive Smoke Exposure - Never Smoker     Smokeless tobacco: Never Used   Substance Use Topics     Alcohol use: Not Currently     Drug use: Not Currently     Types: Marijuana     Comment: x1 in high school         Current Outpatient Medications   Medication Sig Dispense Refill     acetaminophen (TYLENOL) 500 MG tablet Take 1 tablet (500 mg) by mouth every 6 hours as needed for fever or pain 30 tablet 0     albuterol (PROAIR HFA/PROVENTIL HFA/VENTOLIN HFA) 108 (90 Base) MCG/ACT inhaler Inhale 2 puffs into the lungs every 6 hours as needed for shortness of breath / dyspnea or wheezing . No further refills until follow-up appointment 18 g 0     Prenatal Vit-Fe Fumarate-FA (PRENATAL MULTIVITAMIN W/IRON) 27-0.8 MG tablet Take 1 tablet by mouth daily 90 tablet 3         Allergies   Allergen Reactions     Seasonal Allergies          The patient's medical, surgical and family histories were updated and reviewed and were pertinent to this visit as noted.      Pap smear: Last Pap: 3/2022, Result: NILM, Previous History: N/A, Any history of abnormal: none. Next Due: 2025.   "    Review of Systems    General: Fatigue but otherwise denies problem    Eyes: Denies problem    Ears/Nose/Throat: Denies problem    Cardiovascular: Denies problem    Respiratory: Denies problem    Gastrointestinal: Nausea without vomiting, otherwise negative    Genitourinary: Denies any discharge, vaginal bleeding or itchiness or any other problem    Musculoskeletal: Breast tenderness otherwise denies problem    Skin: Denies problem    Neurologic: Denies problem    Psychiatric: Denies problem    Endocrine: Denies problem    Heme/Lymphatic: Denies problem    Allergic/Immunologic: Denies problem        OBJECTIVE:     Objective    Vitals:    06/01/22 1317   BP: 122/66   Pulse: 91   SpO2: 98%   Weight: 93.9 kg (207 lb)   Height: 1.651 m (5' 5\")         Physical Exam:      General Appearance: Alert, cooperative, no distress, appears stated age      HEENT: Normocephalic, without obvious abnormality, atraumatic. Conjunctiva/corneas clear      Neck: Supple, symmetrical, trachea midline, no adenopathy.      Thyroid: not enlarged, symmetric, no tenderness/mass/nodules      Back: Symmetric, no curvature, ROM normal      Lungs: Clear to auscultation bilaterally, respirations unlabored      Heart: Regular rate and rhythm, S1 and S2 normal, no murmur, rub, or gallop. No edema to lower extremities.      Breasts: No breast masses, tenderness, asymmetry, or nipple discharge. Nipples are everted.  Discussed and demonstrated SBE and s/s of breast cancer.    Abdomen: Gravid, soft, non-tender, bowel sounds active all four quadrants, no masses.      FHT: did not attempt to hear s/t early gestation      Pelvic exam: deferred.  Had physical in March with normal pap.  Early ultrasound with normal ovaries bilaterally.    Musculoskeletal: Extremities normal, atraumatic, no cyanosis      Skin: Skin color, texture, turgor normal, no rashes or lesions      Lymph nodes: axillary nodes normal      Neurologic: Alert and oriented x 3. Normal " speech      Lab:   Results for orders placed or performed in visit on 22   CBC with platelets     Status: Normal   Result Value Ref Range    WBC Count 8.2 4.0 - 11.0 10e3/uL    RBC Count 4.08 3.80 - 5.20 10e6/uL    Hemoglobin 12.8 11.7 - 15.7 g/dL    Hematocrit 37.2 35.0 - 47.0 %    MCV 91 78 - 100 fL    MCH 31.4 26.5 - 33.0 pg    MCHC 34.4 31.5 - 36.5 g/dL    RDW 11.8 10.0 - 15.0 %    Platelet Count 220 150 - 450 10e3/uL           ASSESSMENT:  21 year old  at 6w 4d by early dating ultrasound  Hx asthma, has rescue inhaler, uses about twice a year  Hx Depression, managed with therapy years ago, under good control now.  Hx Migraines, usually environmentally triggered  Hx Herpes, genital, unknown type      PLAN:     1.  Discussed working BRYNN based on early ultrasound: 23.     2. Oriented to [facility] services and hospital options; Reviewed prenatal care schedule.  IOB packet given and reviewed with patient.  Given emergency and scheduling numbers.  Advised to program into phone.     3. IOB labs collected including UA/UC, pap screening, and GC/CT.  Pt is interested in drawing lead level. Screening negative.  Patient is interested in waterbirth.  Hep C drawn today.      4. Optimal nutrition, exercise, and weight gain discussed.  BMI today 34.45  Pregnancy weight gain of 11-20 lbs (BMI 30.0 or 34.9) encouraged.  Reviewed weight gain chart to date.     5.Teaching: Anticipatory guidance for common pregnancy questions and concerns reviewed. Danger s/sx for this trimester reviewed with patient. Discussed and reviewed marked pregnancy checklist items.     6. Genetic screening options with reviewed with patient, patient declines.  Does plan anatomy ultrasound around 20 weeks, as well as future early ultrasound for viability.     7. Return to clinic in 4-6 weeks, sooner as needed.           Time spent:  Chart review/Pre-chartin min day of service  Face-to-face visit: 50 min counseling and  education  Documentation: 5 min  Total time spent on day of service: 60 min            MARY Christianson CNM, CNM, CLC     6/1/2022 6:50 AM

## 2022-06-02 LAB
RUBV IGG SERPL QL IA: 1.73 INDEX
RUBV IGG SERPL QL IA: POSITIVE
T PALLIDUM AB SER QL: NONREACTIVE

## 2022-06-03 LAB — BACTERIA UR CULT: NORMAL

## 2022-06-17 ENCOUNTER — HOSPITAL ENCOUNTER (OUTPATIENT)
Dept: ULTRASOUND IMAGING | Facility: HOSPITAL | Age: 22
Discharge: HOME | End: 2022-06-17
Attending: ADVANCED PRACTICE MIDWIFE | Admitting: ADVANCED PRACTICE MIDWIFE
Payer: COMMERCIAL

## 2022-06-17 DIAGNOSIS — Z34.01 ENCOUNTER FOR SUPERVISION OF NORMAL FIRST PREGNANCY IN FIRST TRIMESTER: ICD-10-CM

## 2022-06-17 PROCEDURE — 76801 OB US < 14 WKS SINGLE FETUS: CPT

## 2022-06-18 ENCOUNTER — MYC MEDICAL ADVICE (OUTPATIENT)
Dept: MIDWIFE SERVICES | Facility: CLINIC | Age: 22
End: 2022-06-18
Payer: COMMERCIAL

## 2022-06-18 ENCOUNTER — DOCUMENTATION ONLY (OUTPATIENT)
Dept: MIDWIFE SERVICES | Facility: CLINIC | Age: 22
End: 2022-06-18
Payer: COMMERCIAL

## 2022-07-07 ENCOUNTER — PRENATAL OFFICE VISIT (OUTPATIENT)
Dept: MIDWIFE SERVICES | Facility: CLINIC | Age: 22
End: 2022-07-07
Payer: COMMERCIAL

## 2022-07-07 VITALS
DIASTOLIC BLOOD PRESSURE: 60 MMHG | WEIGHT: 206 LBS | BODY MASS INDEX: 34.32 KG/M2 | HEIGHT: 65 IN | SYSTOLIC BLOOD PRESSURE: 100 MMHG

## 2022-07-07 DIAGNOSIS — Z34.01 SUPERVISION OF NORMAL FIRST PREGNANCY IN FIRST TRIMESTER: Primary | ICD-10-CM

## 2022-07-07 DIAGNOSIS — J45.990 EXERCISE-INDUCED ASTHMA: ICD-10-CM

## 2022-07-07 PROCEDURE — 99207 PR PRENATAL VISIT: CPT | Performed by: ADVANCED PRACTICE MIDWIFE

## 2022-07-07 RX ORDER — ALBUTEROL SULFATE 90 UG/1
2 AEROSOL, METERED RESPIRATORY (INHALATION) EVERY 6 HOURS PRN
Qty: 18 G | Refills: 0 | Status: SHIPPED | OUTPATIENT
Start: 2022-07-07 | End: 2024-07-30

## 2022-07-07 NOTE — PROGRESS NOTES
"Here alone today for routine prenatal visit at 11w5d. Excited to hear heart beat today! Reports feeling fatigue but nausea is improving with dietary modification, some food aversion. Feels fatigue accumulates by end of week with her demanding job. Did review [place] paperwork with CNM last visit and was completed but HR states is \"incomplete.\" All sections of the form were completed and form was signed. She states she needs this if she takes any days off during her pregnancy otherwise her job is at risk; states some days she is just too tired. Advised to reach out to HR and inquire what additional information than what is provided is required and she may bring the paperwork back to discuss. Encouraged improved nutrition to help support her body's demands in pregnancy; reviewed and brainstormed good choices and advised likely to improve her nausea and fatigue. Desires refill to her albuterol, reports using once daily which is increased for her. She reports no primary provider for her asthma care. Given scheduling information to establish care with family medicine for an asthma evaluation. Reviewed IOB labs together wnl. Advised on upcoming visit schedule. Reviewed warning s/sx and reasons to call. RTC 4 weeks.    "

## 2022-07-07 NOTE — PATIENT INSTRUCTIONS
"Welcome to [facility]   and thank you for choosing us for your maternity care provider!  Congratulations!      [employer]  After each of your visits you are welcome to check [employer] for your visit summary including education and links to information relevant to your pregnancy and/or well woman care.   Find the \"Visits\" tab at the top of the page, you will see a list of recent visits and for each visit a for link for \"View After Visit Summary.\" View of your After Visit Summary will allow you to read our recommendations from your visit, review any education provided, and link to websites with useful information.   If you have any questions or difficulty navigating Propable, please feel free to contact us and we will do our best to direct you.  Meet the Midwives from [facility]  You are invited to an informational meet and greet with [facility]'s [facility] Certified Nurse-Midwives. Our free \"Meet the Midwives\" event is a great opportunity to learn about our midwives' philosophy and experience, the hospitals where we can assist with your birth, and answer questions you may have. Partners, friends, and family are welcome to attend. Currently, this is a virtual event.  Date  First Tuesday of every month at 7 pm.    Link to next (live) meeting  [email or web address]  To Join by Telephone (audio only) Call:   [phone] Phone Conference ID: [phone]#    Contact information:  Appointment line and to get a hold of CNM in clinic Monday-Friday 8 am - 5 pm:  [phone].  There are some clinics with early start times (1st appointment 7:40 am) and others with evening hours (last appointment 6:20 pm).  Most are typically open from 8 am to 5 pm.    CNM on call answering service: [phone].  Specify your hospital of choice and leave a brief message for CNM;  will then page CNM who is on call " at your specified hospital and you should receive a call back with 15 minutes.  Be sure that your ringer is audible and that you can accept blocked calls so that we can get back in touch with you! This number should be reserved for urgent needs if during the day, before 8 am, after 5 pm, weekends, holidays.      Pregnancy: Body Changes  From conception (fertilization) until after the birth of your child, you and your baby will change every day. To help you understand what is happening, we ve outlined how pregnancy begins and some of the changes you may notice.  How Pregnancy Begins  Conception is the union of a sperm and an egg. When it occurs, your baby s genetic makeup is complete, even its sex. Fertilization takes place in the fallopian tube. The fertilized egg then travels down this tube to the uterus (womb). The egg attaches to the lining of the uterus about a week later. There it grows and is nourished.    Your Changing Body  Pregnancy affects almost every part of your body. You may notice some of the following physical and emotional changes:  Your uterus expands outward and upward as your baby grows. You may feel pressure on your bladder, stomach, and other organs.  You may notice skin color changes on your forehead, nose, and cheeks. A dark line may form from your bellybutton down to your pubic area. The skin color around your nipples and thighs may also change.  Pink stretch marks may appear on your abdomen, breasts, or hips.  Your hair may seem thicker. You lose less hair during pregnancy.  You may feel fine one day and weepy the next. This is caused by changes in your body, such as increased hormones (chemicals that affect the function of certain organs and also your moods).      Adapting to Pregnancy: First Trimester  As your body adjusts, you may have to change or limit your daily activities. You ll need more rest. You may also need to use the energy you have more wisely.  Eat stomach-friendly foods  like cottage cheese, crackers, or bread throughout the day.    Your Changing Body  Almost every part of your body is affected as you adapt to pregnancy. The uterus and cervix will begin to soften right away. You may not look very pregnant during the first three months. But you are likely to have some common signs of early pregnancy:  Nausea  Fatigue  Frequent urination  Mood swings  Bloating of the abdomen  Missed or light periods (first trimester bleeding)  Nipple or breast tenderness, breast swelling    It s Not Too Late to Start Good Habits  What matters most is protecting your baby from this moment on. If you smoke, drink alcohol, or use drugs, now is the time to stop. If you need help, talk with your health care provider.  Smoking increases the risk of stillbirth or having a low-birth-weight baby. If you smoke, quit now.  Alcohol and drugs have been linked with miscarriage, birth defects, intellectual disability, and low birth weight. Do not drink alcohol or take drugs.    Tips to Relieve Nausea  Although nausea can occur at any time of the day, it may be worse in the morning. To help prevent nausea:  Eat small, light meals at frequent intervals.  Get up slowly. Eat a few unsalted crackers before you get out of bed.  Drink water with lemon slices.  Eat an ice pop in your favorite flavor.  Ask your health care provider about taking kiah or vitamin B6 for nausea and vomiting.  Talk with your health care provider if you take vitamins that upset your stomach.    Work Concerns  The end of the first trimester is a good time to discuss working during pregnancy with your employer. Follow your health care provider s advice if your job requires you to stand for a long time, work with hazardous tools, or even sit at a desk all day. Your workspace, workload, or scheduled hours may need to be adjusted. Perhaps you can change body postures more often or take an extra break.  Advice for Travel  Talk to your health care  provider first, but the second trimester may be the best time for any travel. You may be advised to avoid certain trips while you re pregnant. Food and water can be concerns in developing countries. Travel by car is a good choice, as you can stop, get out, and stretch. Bring snacks and water along. Fasten the lap belt below your belly, low over your hips. Also be sure to wear the shoulder harness.  Intimacy  Unless your health care provider tells you to, there is no reason to stop having sex while you re pregnant. You or your partner may notice changes in desire. Desire may be less in the first trimester, due to nausea and fatigue. In the second trimester, sex may be very enjoyable. The third trimester can be a challenge comfort-wise. Try different positions and see what s best for you both.      Pregnancy: Your First Trimester Changes  The first trimester is a time of rapid development for your baby. Because your baby is growing so quickly, it is important that you start a healthy lifestyle right away. By the end of the first trimester, your baby has formed all of its major body organs and weighs just over an ounce.    Month 1 (Weeks 1-4)  The placenta (the organ that nourishes your baby) begins to form. The heart and lungs begin to develop. Your baby is about 1/4 inch long by the end of the first month.    Month 2 (Weeks 5-8)  All of your baby s major body organs form. The face, fingers, toes, ears, and eyes appear. By the end of the month, your baby is about 1 inch long.    Month 3 (Weeks 9-12)  Your baby can open and close its fists and mouth. The sexual organs begin to form. As the first trimester ends, your baby is about 4 inches long.      Pregnancy: Your Weight  Being a healthy weight is important for both you and your baby. The weight you gain now is not just extra fat. It is also the weight of your baby. And it is the increased blood and fluids to support the baby. A slow, steady rate of gain is best. How  much you should gain depends on your weight before getting pregnant. Check with your health care provider to find out what is right for you.    If You Gain Too Much  Gaining too much weight might cause you to feel tired or you could have a harder pregnancy or birth. If you and your health care provider decide you re gaining too much:  Eat fewer fats and sugars. Instead, eat fruit, vegetables, and whole-grain foods.  Drink plenty of water between meals.  Get at least 20 minutes of light exercise, such as walking, each day.  Don t diet. You might not get enough of the nutrients you or your baby needs.  Keep a diet diary to help you gauge what and how much you are eating .    If You re Not Gaining Enough  If you don t gain enough, your baby could be too small or have health problems. Women tend to gain most of their weight in the second and third trimesters. For now:  Eat many types of foods. Make sure you get enough calcium, protein, and carbohydrates.  Don t skip meals.  Eat healthy snacks.  Pick nutrient-dense, high calorie healthy food like trail mix or protein shakes.  See a dietitian for help.  Talk to your healthcare provider if you have had an eating disorder or problems with certain foods.      Pregnancy: Common Questions  There are plenty of myths and  old wives  tales  surrounding pregnancy. You may need help  fact from fiction. On this sheet, you ll find answers to a few common questions. If you have other questions, talk with a midwife.    Will Working Harm My Baby?  In most cases, working throughout your pregnancy is not harmful at all. There may be concerns if the job involves dangerous machinery or chemicals, lifting, or standing for very long periods of time. Talk to your health care provider and employer about your particular job and pregnancy.  Why Can t I Change the Cat Litter Box?  Cats carry a disease called toxoplasmosis. In adult humans, it shows up as a mild infection of the blood and  organs. If you are infected during pregnancy, the baby s brain and eyes could be damaged. To be safe, have someone else change the litter. If you must handle it, wear a paper mask over your nose and mouth. Also, wear gloves and wash your hands afterward.  Which Medications Are Safe?  No prescription or over-the-counter drug is safe for everyone all of the time. But sometimes medications are needed. Be sure your health care provider knows you are pregnant. Then use only the medications he or she advises you to take. Please refer to the below resources for further information and discuss concern and questions with your midwife.  Is It True That I Can Overheat My Baby?  Yes. To avoid making your baby too warm:  Don t sit in a jacuzzi. A long, warm bath is fine, but not in water over 100 F.  Exercise less intensely if you feel fatigued. Base your workout on how you feel, not your heart rate. Heart rates aren t a good way to measure effort during pregnancy.  Can I Lift and Carry Safely?  Yes, if your health care provider doesn t tell you otherwise. Learn to lift and carry safely to avoid injury and reduce back pain during pregnancy. To protect your back:  Bend at the knees to bring the load nearer.  Get a good . Test the weight of the load.  Tighten your abdomen. Exhale as you lift.  Lift with your legs, not with your back.  Carry the load close to your body.  Hold the load so you can see where you are going.  What If I Get Sick?  Most women get sick at least once during pregnancy. Talk with your health care provider if you do. Most likely it will not affect your pregnancy. Get plenty of rest and fluids, and eat what you can. Talk to your health care provider before taking any medications.        HEALTHY PREGNANCY CARE: 10-14 WEEKS PREGNANT     By weeks 10 to 14 of your pregnancy, the placenta has formed inside your uterus. It may be possible to hear your baby's heartbeat with a doppler ultrasound device. Your baby's  eyes can and do move. The arms and legs can bend.    GENETIC SCREENING OPTIONS AT [facility]              All testing is optional. We don t recommend or discourage any test; it is totally up to you and your partner. Some couples wish to know their risk of having a baby with a genetic defect and others do not. We will support your decision. Abnormal results may lead to a discussion of options for further testing.  Accurate dating of your pregnancy is important for all testing so an ultrasound may be done prior to referral or testing.  No testing provides certainty; there are false positives and negatives associated with all testing, some more than others.  Most genetic testing is non-invasive (requires only a blood sample and sometimes an ultrasound or both).  It is always wise to check with your insurance carrier before proceeding.  Some testing can be done at our lab and some require a referral.  If you decide to do no testing, the 20 week ultrasound scan, which is a routine or standard ultrasound, has some ability to detect abnormalities in the baby, and identifies obstetric problems.  If you are over 35, you will have the option of a Level II ultrasound, which is a more detailed and targeting scan, that helps detect fetal anomalies as well as obstetric problems.    If you have a more complex family history of chromosomal abnormalities, a referral to a genetic counselor and/or a Maternal Fetal Medicine specialist, to help identify available tests, may be recommended.    TYPES OF GENETIC TESTING AVAILABLE INCLUDE:    Carrier Screening/Testing for Genetic Conditions  There are many inherited conditions for which testing or carrier testing is available. Carrier screening can test for conditions like Cystic Fibrosis, Thalassemia, Tj Sachs, Sickle Cell, Hemophilia, Muscular Dystrophy, [place] s disease and many others.   Cystic Fibrosis (CF) affects both males and females and people from all racial and ethnic  groups. However, the disease is most common among Caucasians of Northern  descent. CF is also common among Latinos and American Indians. The disease is less common among  Americans and  Americans. More than 10 million Americans are carriers of a faulty CF gene and many of them don't know that they are CF carriers. One or both parents can be tested any time before or during pregnancy.  Talk to your care provider about your family history and whether you should be screened. A referral to a genetic counselor at [facility] or [facility] can be made by your care provider at any time.  This is also tested for in the  Metabolic Screen that your infant receives 24 hours after birth.     Fetal DNA cell Testing: [place] or Innatal (Non-Invasive Prenatal Testing)  At 10 wk or greater, a blood sample can be drawn here at clinic or at any of our referral offices. It will provide highly accurate results with low false positive rates for trisomy 18, trisomy 21 (Down Syndrome), and trisomy 13 (> 99% trisomy detection rate at a false positive rate of <0.1%). Gender identification can also be obtained if desired (98% accuracy).  Can also detect some sex-linked chromosomal abnormalities (80-90% accuracy).  This is often done if one of the other screening tests is abnormal.        1st Trimester Screening:   Everyone has an age related risk of having a baby with a genetic abnormality. This testing provides a risk profile which is better than assigning risk based solely on your age.  Refines your risk of having a baby with a chromosomal abnormality such as Trisomy 21 & 18.  This test with detect a fetus with one of these disorders about 85% of the time.  A thickened nuchal fold can also be associated with cardiac defects.  This test requires a blood collection combined with ultrasound to obtain a measurement of fluid at back of baby s neck (nuchal translucency).  False positive results occur  approximately 5% of cases.  An additional blood sample may be necessary in order to calculate your risk of having a baby with a neural tube defect (e.g. spina bifida).  This is called the AFP test (alpha fetal protein).  An ultrasound should be able to  any spinal defect as well.  Follow-up of an abnormal test may include a more extensive ultrasound study and you may be offered an amniocentesis (a small sample of amniotic fluid is withdrawn and studied) for a definitive diagnosis    Quad Screen (4-marker screen)  Between 15 and 21 weeks, a sample of blood can be drawn at our lab to assess your risk of having a baby with Down Syndrome, Trisomy 18 and neural tube defects. Such testing is able to detect these conditions in 80% of cases and the false-positive rate is approximately 5%.   Follow-up of an abnormal test may include a more extensive ultrasound study and you may be offered an amniocentesis (a small sample of amniotic fluid is withdrawn and studied) for a definitive diagnosis      Referrals opportunities include:  [facility] [place],  [facility]  Offers nuchal translucency ultrasound; does not offer genetic counseling.  [place]  [facility] and [facility] ([facility]):   Approximately an hour long visit includes 30 min with genetic counselor who discusses all testing available and which ones might be beneficial to you based on age, personal and family history.    Blood will be drawn and the nuchal translucency ultrasound will be discussed and performed if desired. The Free Fetal DNA testing ([employer]) can be drawn also.  Targeted or detailed Level II ultrasounds are also available with these perinatology groups.        Breastfeeding: a Healthy Option for You and Your Baby  Consider breastfeeding for the healthiest way to feed your baby. Ask your midwife or physician for more information.     The choice of how you will feed your baby is  important.  Before your baby s birth, you ll want to learn about the benefits of breastfeeding.  [facility] have been designated Baby Friendly; an initiative that was created by the World Health Organization and UNICEF.  This helps give you and your baby the best start in feeding their baby.    Why should I breastfeed my baby?   Babies are less likely to develop childhood obesity or diabetes   Babies are less likely to suffer from recurrent ear infections   Babies are less likely to be hospitalized for respiratory conditions   Breast milk is rich in nutrients and antibodies-it is easy to digest    How does it benefit me?   Lowers the risk for diabetes, breast and ovarian cancer and postpartum depression   Moms can lose  baby weight  more quickly   Cost savings - formula can cost well over $1,500 per year   Convenient - no bottles and nipples to sterilize, no measuring and mixing formula   The physical contact with breastfeeding can make babies feel secure, warm and comforted     What about formula?  While you and your baby are staying with us at [facility], we will support whatever feeding choice you make for your baby.    Some important considerations:    The American Academy of Pediatrics, the World Health Organization, and many more organizations recommend exclusive breastfeeding for 6 months and continued breastfeeding while adding other foods for the first 1-2 years.    Any amount of breastmilk has benefits to both baby and mother.  Giving formula in replacement of breastfeeding can affect mother s milk supply.  If formula is needed, hospital staff will work with you on a plan to help develop your milk supply.  Formula alters the natural growth of good bacteria in the  stomach.   Research has found that first time mothers who offer formula in the hospital have a shorter duration of breastfeeding.    How can I start to prepare?   Start by having a conversation with your medical provider.   Talk  with your partner, family and friends.   Attend a prenatal class that includes breastfeeding preparation. Birth and breastfeeding classes are offered by [facility]. Visit [employer] for class information.   After your baby s birth, hospital staff and lactation consultants will help you and your baby get off to a great start with breastfeeding.    As your center of gravity and weight changes, use good body mechanics when changing positions and lifting. For example, use a straight back and your legs for support when lifting instead of bending over. Maintain good posture to prevent straining your muscles. Now is a good time to continue or restart your exercise program. Walking 30-60 minutes daily is an excellent way to keep fit. Yoga and swimming also offer many benefits.    The nausea and fatigue of early pregnancy have usually started to let up, so this is a good time to focus on nutrition. Consider attending a nutrition class. A healthy diet includes about 60 grams of protein each day (3-4 servings of dairy, 2-3 servings of meat/fish/poultry/nuts), 4-6 servings of whole grain foods, and 5-6 servings of fruits and vegetables. Remember to drink 6-8 glasses of water daily.    Watch for warning signs, such as   vaginal bleeding  fluid leaking from your vagina  severe abdominal pain  nausea and vomiting more than 4-5 times a day, or if you are unable to keep anything down  fever more than 100.4 degrees F.       RESOURCES   You can refer to the Starting Out Right book or find it online at [email or web address] or [email or web address]/images/[email or web address]-out-[email or web address]-starting-out-[email or web address]#[email or web address]    You can sign up for a weekly parenting e-mail that gives support, tips and advice from health care professionals that starts with pregnancy and continues through the toddler years. To register, go to  [email or web address] at any time during your pregnancy.    Breastfeeding:    OUTPATIENT LACTATION RESOURCES     -Schedule an appointment with a [facility] Nurse Midwives [facility] CN who is also a Lactation Consultant by calling [phone]. We see women for breastfeeding visits at [facility] and [facility].     Chocolate Milk Club:  [email or web address]  Join the Facebook group or join us for support on the first Monday of each month from 7 to 9 p.m.  , Dr. Lucas Nichole, DNP, CNM, CNP, IBCLC, ICEA  Phone: [phone]  Fax: [phone]  Email: [email or web address]    R.O.S.E. = Reaching our Sisters Everywhere  [email or web address]    Black Women Do Breastfeed Blog  www.[email or web address]    Club Mom breastfeeding support for Black mothers:  Contact Anuj Trejo  Phone: [phone]   Email:  [email or web address].     Brii De Paz  Phone: [phone]   Email:  [email or web address] Dad parent support for Black fathers:   Contact Chase Kathleen   Phone: [phone]   Email:  [email or web address].    The [employer] Breastfeeding Coalition is a wonderful support for [facility] women who are breastfeeding.  They are best found on Facebook.      The [employer] Breastfeeding [employer] has produced a collection of video stories about breastfeeding in the ong community, produced by the [employer].  Most are in English, but one on handling breastmilk is in ong.  The video collection is in the middle of the page.    This page also has several other resources on Hmong breastfeeding.     https://[email or web address]/communities/    WIC program application: Nawaf Marlon   Https://www.youIPXI.com/watch?v=lQoWwPoyGYc    For information on Local WIC services call:  4-142-220-8708    You may qualify...  If you are pregnant, nursing, or  have a child under age 5, we encourage you to Apply for WIC.    WIC Provides...  Nutrition tips and advice  Support for breastfeeding  Healthy foods like fresh fruits and vegetables, whole grain cereals, bread and tortillas, low fat milk, and baby foods  Caring and supportive staff  Don't delay! We're here to help!  CALL TODAY FOR A WIC CLINIC NEAR YOU  [phone] or [phone]    Resource/WIC/[facility] health improvement partnership:     -Baby Café  Find a Baby Café - Baby Café [place] ([email or web address])   Search by State ([place]) to find the nearest cafe to you      [facility] Baby Café  Due to COVID-19, all Baby Café sessions are canceled until further notice. For lactation support, please contact one of our bilingual staff:  Audra (IBCLC) [phone]  Radha (IBCLC/ Bruneian) [phone]  Marzena (Hmong) [phone]  Gomez (Burmese) [phone]  Baby Café is a free, drop-in service offering breastfeeding/chestfeeding support. Come share tips and socialize with other pregnant, breastfeeding/chestfeeding families. Babies and siblings are welcome (no  available).  We offer:  Professionally trained lactation staff.  Resource books for lending.  Relaxed and fun atmosphere.  Refreshments.  Locations  Baby Café is offered at several locations.  Please see below for the Baby Café closest to you.  CANCELED UNTIL FURTHER NOTICE  [facility]  [address] [facility], [facility]  1st Wednesdays of the month   10 a.m. - Noon  CANCELED UNTIL FURTHER NOTICE  [facility] [address], [place], [address]  4th Wednesdays of the month   10 a.m. - 12:30 p.m.     CANCELED UNTIL FURTHER NOTICE  [facility]  [address], [place], [address]  4th Wednesdays of the month  4 - 6 p.m.  CANCELED UNTIL FURTHER NOTICE  [facility]  [address], [place], [facility]  2nd Thursdays of the month.  9:30-11:30 a.m.  Enter through the east end of the  building, the blue Door C.  Ring the ECFE buzzer to be let in.   More information  Radhafabiola Cantor  [phone]  [email or web address]     -Attend a baby weigh in at [facility].  Lactation consultants are available to answer questions  [facility]: Tuesdays 1:00 - 2:00  [facility], [facility]: Mondays 1:00 - 2:00   www.Enubila.GeekChicDaily    -Attend one of the New Mama groups at [facility] in [facility].  [facility] also offers one-on-one in home and in office lactation consults.   www.[facility]    -Attend a LeLeche League meeting.  Multiple groups in several locations throughout the [facility]. The meetings are no-cost and always informative breastfeeding education session through [facility] La Leche Leginna  Www.lllondas.org/     Held at [facility] the second Thursday of each month at 7pm    Childbirth and Parenting Education:       Everyday Miracles:   https://www.everyday-miracles.org/    Free Video Series from [facility]: https://nursing.[facility]/academics/specialty-areas/nurse-midwifery/having-baby-prenatal-videos/having-baby-prenatal-and    [facility]: http://[facility] HealthPrimo.io/   [phone]  [place]: (education, yoga & wellness) www.Reunify  [facility]: www.Dsg.nrAdventHealth Fish MemorialBitzer Mobile   Childbirth collective: (Parent topic nights)  www.childbirthcollective.org/  Hypnobabies:  www.hypnobabiestwincities.com/  Hypnobirthing:  Http://hypnobirthing.com/  The Birth Hour: https://thebirthhour.GeekChicDaily/online-childbirth-class/    APPS and Podcasts:   Ping López Nurture    Evidence Based Birth  The Birth Hour (for birth stories)   Birthful   Expectful   The Longest Shortest Time  PregnancyPodcast Briseida Leroy    Book Recommendations:   Angela Galesburg's Birthing From Within--first few chapters include a new-age tone, you may prefer to skip it and keep going, because there is good stuff later.  This book recommendation covers emotional preparation,  "but does cover coping with pain, and use of both pharmacological and nonpharmacological methods.    Dr. Barger' The Pregnancy Book and The Birth Book--the pregnancy book goes month-by month      The Birth Partner by Christie Liu    Womanly Art of Breastfeeding by La Leche League International   Bestfeeding by Elma Grossman--great pictures    Mothering Your Nursing Toddler, by Nazia Cramer.   Addresses dealing with so many of the challenging behaviors of a nursing toddler.  How Weaning Happens, by La Leche League.  Discusses weaning at all ages, from medically necessary weaning of an infant, all the way up to age 5 (or older), with why/why not, and strategies.  Very empowering book both for deciding to wean and deciding not to.    American College of Nurse-Midwives (ACNM) http://www.midwife.org/; look at the informational handouts at http://www.midwife.org/Share-With-Women     www.mymidwife.org    Mother to Baby (Medication and Herbal guidance in pregnancy): http://www.mothertobaby.org  Toll-Free Hotline: 253.237.6239  LactMed (Medication use while breastfeeding): http://toxnet.nlm.nih.gov/newtoxnet/lactmed.htm    Women's Health.gov:  http://www.womenshealth.gov/a-z-topics/index.html    American pregnancy association - http://americanpregnancy.org    Centering Pregnancy (group prenatal care option): http://centeringhealthcare.org    Information about doulas:  Childbirth collective: http://www.childbirthcollective.org/  Doulas of [place] (LATHA):  www.latha.org  [facility]  project: http://twincitiesdoulaproject.com/     Early Childhood and Family Education (ECFE):  ECFE offers parents hands-on learning experiences that will nourish a lifetime of teachable moments.  http://ecfe.info/ecfe-home/    March of Dimes www.medineering     FDA - Nutrition  www.mypyramid.gov  Under \"For Consumers,\" click on \"pregnant and breastfeeding women.\"      Centers for Disease Control and Prevention (CDC) - Vaccines " : http://www.cdc.gov/vaccines/       When researching information on the web, question the validity of websites.  The [employer] .gov, .edu and.org tend to be more reliable information.  If there are a lot of advertisements, be cautious of the information provided. Stay away from blogs and chat rooms please!      Nutrition & supplements:     Prenatal vitamin (those with 600-1000 mcg folic acid and 27 mg of iron are enough).  Take with food or Juice     4-5 servings of dairy or other calcium rich foods (fish, leafy greens, soy) per day - if not, take 500-1000 mg additional calcium (Tums, pills, chews). Take with dairy     Vitamin D3 9856-1432 IU geltab daily.  Take with fattiest meal.  Look for fortified foods also (Dairy, Juice)     2-3 (4) oz servings of fish, seafood, nuts (walnuts & almonds), oils, avocado per week - if not, take Omega 3 Fatty acids: DHA & CAROLINE 6622-4852 mg per day.  Other names: cod liver oil, fish oil. Take with fattiest meal.  Some prenatals have DHA, but typically not a sufficient dose.    Fish: Do not eat shark, swordfish, taniya mackerel, or tilefish when you are pregnant or breastfeeding.  They contain high levels of mercury.  Limit white (albacore) tuna to no more than 6 ounces per week. Http://www.fda.gov/downloads/ForConsumers/ConsumerUpdates/TYR609914.pdf         Touring the Maternity Care Center  At this time we are offering a virtual tour of the Maternity Care Centers at both [facility] and [facility]:   [facility]: https://www.SurgiLight.org/Locations/[facility]/Maternity-Care-Center  [facility]:   https://SurgiLight.org/overarching-care/the-birthplace/tours  https://www.SurgiLight.org/Locations/[facility]/Maternity-Care-Center/#virtual_tour  When in person tours become available, registrations is required. To schedule a tour at either [facility] or [facility], please do so online using the following links:  [facility]  [email or web address]://www.[email or web address].com/registerlist.[email or web address]?[email or web address]&[email or web address]&[email or web address]&[email or web address]&[email or web address]&ps=1&[email or web address]&it=1&nrw=253  [facility] - [email or web address]://www.onlineregistrationcenter.com/registerlist.asp?s=[email or web address]&[email or web address]&[email or web address]&p=2&[email or web address]&ps=1&group=38&it=1&ibt=861     You are invited to  Meet the [facility]    Virtual:   https://www.[facility] Hospital[facility]izMeta.org/classes-and-events/meet-the-midwives-from-[facility]    A way to tour the hospital Labor and Delivery unit and meet the midwives in our group was postponed at the start of hospital restrictions following COVID-19. We will resume these when able.    Please call [phone] for ongoing updates.

## 2022-08-08 ENCOUNTER — PRENATAL OFFICE VISIT (OUTPATIENT)
Dept: MIDWIFE SERVICES | Facility: CLINIC | Age: 22
End: 2022-08-08
Payer: COMMERCIAL

## 2022-08-08 VITALS
SYSTOLIC BLOOD PRESSURE: 100 MMHG | HEART RATE: 84 BPM | BODY MASS INDEX: 34.32 KG/M2 | WEIGHT: 206 LBS | DIASTOLIC BLOOD PRESSURE: 64 MMHG | HEIGHT: 65 IN

## 2022-08-08 DIAGNOSIS — Z34.01 SUPERVISION OF NORMAL FIRST PREGNANCY IN FIRST TRIMESTER: Primary | ICD-10-CM

## 2022-08-08 PROCEDURE — 99207 PR PRENATAL VISIT: CPT | Performed by: MIDWIFE

## 2022-08-09 RX ORDER — FERROUS SULFATE 325(65) MG
325 TABLET ORAL
Qty: 60 TABLET | Refills: 3 | Status: SHIPPED | OUTPATIENT
Start: 2022-08-09 | End: 2023-06-02

## 2022-08-09 RX ORDER — MULTIVIT WITH MINERALS/LUTEIN
250 TABLET ORAL DAILY
Qty: 30 TABLET | Refills: 3 | Status: SHIPPED | OUTPATIENT
Start: 2022-08-09 | End: 2023-06-02

## 2022-08-09 RX ORDER — LANOLIN ALCOHOL/MO/W.PET/CERES
1000 CREAM (GRAM) TOPICAL DAILY
Qty: 30 TABLET | Refills: 3 | Status: SHIPPED | OUTPATIENT
Start: 2022-08-09 | End: 2023-06-02

## 2022-08-09 NOTE — PROGRESS NOTES
Cassidy is here for ISABEL.  Feeling better, still has some queasiness, no change in weight since last visit.  Had some vaginal discharge but this has cleared up now.  She is interested in knowing fetal sex at 20 week US, referral placed for this ultrasound.  Reviewed IOB labwork and questions answered.  No quickening yet.  Desires rx for Iron, Vitamin C and B12.  Refills placed.  Reviewed danger s/sx, when to call CNM on call. RTC in 4 weeks or PRN.

## 2022-09-06 NOTE — PROGRESS NOTES
Cassidy is here with Javy for a routine prenatal visit at 20w5d.  Just returned from ultrasound but report not yet available.  Denies any pregnancy discomforts at this time.  9 pound interval weight gain noted.  Admits recent visit to the fair with lots of family foods.  No formal exercise at this time.  Nutrition counseling given and encouraged daily walking.  Plans breast-feeding and may opt to exclusively pump.  Has ordered a breast pump through her insurance.  Childbirth education options reviewed and community resources discussed. Mid pregnancy anticipatory guidance reviewed. Enc follow-up in 4weeks or sooner prn.  Declines flu shot today but may opt for it at next visit.

## 2022-09-08 ENCOUNTER — PRENATAL OFFICE VISIT (OUTPATIENT)
Dept: MIDWIFE SERVICES | Facility: CLINIC | Age: 22
End: 2022-09-08
Payer: COMMERCIAL

## 2022-09-08 ENCOUNTER — HOSPITAL ENCOUNTER (OUTPATIENT)
Dept: ULTRASOUND IMAGING | Facility: CLINIC | Age: 22
Discharge: HOME | End: 2022-09-08
Attending: MIDWIFE | Admitting: MIDWIFE
Payer: COMMERCIAL

## 2022-09-08 VITALS
HEIGHT: 65 IN | WEIGHT: 215.2 LBS | SYSTOLIC BLOOD PRESSURE: 114 MMHG | DIASTOLIC BLOOD PRESSURE: 66 MMHG | HEART RATE: 80 BPM | BODY MASS INDEX: 35.85 KG/M2

## 2022-09-08 DIAGNOSIS — Z34.01 SUPERVISION OF NORMAL FIRST PREGNANCY IN FIRST TRIMESTER: ICD-10-CM

## 2022-09-08 DIAGNOSIS — Z34.00 FIRST PREGNANCY, UNSPECIFIED TRIMESTER: Primary | ICD-10-CM

## 2022-09-08 PROCEDURE — 76805 OB US >/= 14 WKS SNGL FETUS: CPT

## 2022-09-08 PROCEDURE — 99207 PR PRENATAL VISIT: CPT | Performed by: ADVANCED PRACTICE MIDWIFE

## 2022-09-08 NOTE — PATIENT INSTRUCTIONS
"[facility] Nurse Midwives [facility] Contact information:  Appointment line and to get a hold of CNM in clinic Monday-Friday 8 am - 5 pm:  [phone].  There are some clinics with early start times (1st appointment 7:40 am) and others with evening hours (last appointment 6:20 pm).  Most are typically open from 8 am to 5 pm.    CNM on call answering service: [phone].  Specify your hospital of choice and leave a brief message for CNM;  will then page CNM who is on call at your specified hospital and you should receive a call back with 15 minutes.  Be sure that your ringer is audible and that you can accept blocked calls so that we can get back in touch with you! This number should be reserved for urgent needs if during the day, before 8 am, after 5 pm, weekends, holidays.    Contact the on-call CNM with warning signs, such as:  vaginal bleeding   Vaginal discharge and itching or pain and burning during urination  Leg/calf pain or swelling on one side  severe abdominal pain  nausea and vomiting more than 4-5 times a day, or if you are unable to keep anything down  fever more than 100.4 degrees F.   [employer]  After each of your visits you are welcome to check "[employer] for your visit summary including education and links to information relevant to your pregnancy and/or well woman care.   Find the \"Visits\" tab at the top of the page, you will see a list of recent visits and for each visit a for link for \"View After Visit Summary.\" View of your After Visit Summary will allow you to read our recommendations from your visit, review any education provided, and link to websites with useful information.   If you have any questions or difficulty navigating [employer], please feel free to contact us and we will do our best to direct you.  Meet the Midwives from [facility]  You are invited to an informational meet and greet with [facility]'s [facility] Certified Nurse-Midwives. Our free " "\"Meet the Midwives\" event is a great opportunity to learn about our midwives' philosophy and experience, the hospitals where we can assist with your birth, and answer questions you may have. Partners, friends, and family are welcome to attend. Currently, this is a virtual event.  Date  First Tuesday of every month at 7 pm.    Link to next (live) meeting  https://www.Quickcue.Network Intelligence/classes-and-events/meet-the-midwives-from-[facility]  To Join by Telephone (audio only) Call:   [phone] Phone Conference ID: [id number]#      UNDERSTANDING  LABOR    Going into labor before your 37th week of pregnancy is called  labor.  labor can cause your baby to be born too soon. This can lead to a number of health problems that may affect your baby. From 28-35 weeks, Patients are advised to be evaluated at [facility] [place] since they have a  Intensive Care Unit (NICU).  -Before labor, the cervix is thick and closed.  -In  labor, the cervix begins to efface (thin) and dilate (open) over a short period of time    Symptoms of  Labor  If you believe you re having  labor, contact the midwives right away. But contractions alone don t mean you re in  labor. What matters more are changes in your cervix (the lower end of the uterus). Symptoms of  labor include:  five or more contractions per hour  Strong & frequent contractions  Constant menstrual-like cramping  Low-back pain  Mucous or bloody vaginal discharge  Bleeding or spotting in the second or third trimester    Evaluating  Labor  Your midwife will try to find out whether you re in  labor or whether you re just having contractions.She may watch you for a few hours. The following tests may be done:  Pelvic exam to see if your cervix has effaced (thinned) and dilated (opened)  Uterine activity monitoring to detect contractions  Fetal monitoring to check the health of your " baby  Ultrasound to check your baby s size and position    Caring for Yourself At Home  If you have contractions  but your cervix is still thick and closed, the midwife may ask you to do the following at home:  Drink plenty of water.  Do fewer activities.  Rest in bed on your side.  Avoid intercourse and nipple stimulation.    When to Call Your Midwife  Five or more contractions per hour  Bag of water breaks  Bleeding or spotting     If You Need Hospital Care   labor often requires that you have hospital care and complete bed rest. You may have an IV (intravenous) line to get fluids. And you may be given pills or an injection to help prevent contractions. Finally, you may receive medication (corticosteroids) that helps your baby s lungs mature more quickly.    Are You At Risk?  Any pregnant woman can have  labor. It may start for no reason. But these risk factors can increase your chances:  Past  labor or past early birth  Smoking and drug or alcohol use during pregnancy  Multiple fetuses (twins or more)  Problems with the shape of the uterus  Bleeding during the pregnancy    The Dangers of  Birth  A baby born too soon may have health problems. This is because the baby didn t have enough time to mature. The baby then is at risk of:  Not breastfeeding well  Having immature lungs  Bleeding in the brain  Dying    Reaching Term  Your goal is to get as close to term as you can before giving birth. The closer you get to term, the higher your chance of having a healthy baby. Work with your healthcare provider. Together, you can take steps that may keep you from giving birth too early.        Testing for Gestational Diabetes in Pregnancy  [facility] are committed to providing safe care during your pregnancy. We follow the recommendations of the American Diabetes Association and the American College of Obstetricians and Gynecologists to test all pregnant women for gestational  diabetes. Testing early in pregnancy (if you have risk factors) and testing all women between 26-28 weeks follows local and national guidelines for care during pregnancy. Clients who feel that they cannot consent to such testing, may choose to transfer their care to our consultant obstetricians.  What is the test?  Eat normally on the day of the test; a diet rich in protein, whole grains, and vegetables would be best. Avoid simple sugars, white flour products and juices prior to testing.  You will be asked to drink a 10 oz glucose beverage (50 gm, about the same as a can of root beer).  The product is not carbonated as it has to be consumed within 5 minutes. During the next hour, you are seen for a prenatal visit, but are asked to limit your activity around the clinic. Feel free to bring a book or your computer.   Any level less than 130 for this  glucose challenge test  is considered normal. If measured at 140 to 185, the diagnostic test, a  3 hour glucose tolerance test  will be conducted. If 2 or more readings are abnormal, the diagnosis of gestational diabetes is confirmed and a referral to a diabetes educator will be made. If the level is 185 or above, this confirms the diagnosis and a referral will be made without administering the 3 hour test.  A fasting blood sugar may be performed sometime in the first trimester if you have risk factors for the development of gestational diabetes such as a previous diagnosis or birth of a large baby or a close family relative with diabetes.  What is gestational diabetes?  Your body converts what you eat into glucose. In response to rising blood sugar levels it secretes insulin in order to be able to utilize that glucose. During pregnancy as the placenta grows and matures, it secretes hormones that are necessary to assure baby s growth and development, however, they also reduce the action of insulin in the mother. In some cases too much insulin is blocked (this is called   insulin resistance ) and blood sugar in the mother rises above a normal level. Pregnant women who have never had diabetes before but who have high blood sugar (glucose) levels during pregnancy are said to have gestational diabetes. Gestational diabetes affects about 4-7% of all pregnancies.  What if I have gestational diabetes?  If either of the tests for gestational diabetes show that you have this condition, a referral will be made to visit with the diabetes educator. The educator will help you to make wise food choices, count carbohydrates, monitor your blood sugar and record your levels in a journal. We ask that you bring this diary with you at each prenatal visit. You may meet with the educator several times during the remainder of your pregnancy.  How gestational diabetes can affect your baby  Some mothers may wonder why testing for GDM is delayed until the early part of the 3rd trimester for most women. Gestational diabetes has an impact on the baby at the time of rapid body growth. When the mother s blood has elevated sugar levels, extra glucose crosses the placenta causing the baby to have excess weight gain ( macrosomia ). Some babies are too big to be born vaginally so their mother must have  births. Babies of mothers with uncontrolled gestational diabetes may have difficult births that can cause trauma to the mother and in rare circumstances, babies may suffer fractures or oxygen deprivation during birth. They may have difficulty maintaining their own blood sugar after birth and they may also have trouble adapting to life outside. Recent research indicates that babies of mothers with uncontrolled or undiagnosed gestational diabetes are at risk for obesity and type 2 diabetes. Women who develop gestational diabetes are more likely to develop type 2 diabetes within 15 years after their pregnancy.  Additional Information  The American College of Nurse Midwives (ACNM) provides an information sheet  describing diabetes screening in pregnancy: http://www.womensdocs.com/tahira/pdf/Second_Trimester/Gestational_Diabetes.pdf    You can visit the American Diabetes Association website http://www.diabetes.org for additional information and to purchase their book,  Gestational Diabetes: What to Expect .    A brochure from the American College of Obstetrics and Gynecology is available at:   http://www.acog.org/~/media/For%20Patients/cfm761.pdf?dmc=1&oq=[id number]  Testing for gestational diabetes is a critical part of your prenatal journey. Thank you for taking good care of yourself and your baby.    HEALTHY PREGNANCY CARE: 22-26 WEEKS PREGNANT    You are finishing your second trimester. Your baby is developing rapidly. At this stage, babies have a sense of balance, can respond to touch, and are recognizing parent voices.  Your baby will be moving around more now.  You may notice John-Harley contractions now, which are painless and prepare the uterus for the delivery.    Nutrition: During this time, you may find that your nausea and fatigue are gone. Overall, you may feel better and have more energy than you did in your first trimester. Be sure you are getting enough calcium and iron in your diet. Your prenatal vitamins cannot supply all of the nutrients you need, so continue to eat 3-4 servings of dairy foods and 2-3 servings of meat/fish/poultry/nuts every day. Foods high in iron include: red meats, eggs, dark green vegetables, dark yellow vegetables, nuts, kidney beans and chickpeas. Some cereals are fortified with iron, so look at the food labels for 100% of the daily requirement for iron.     Discuss your work situation with your midwife or physician as needed. If you stand for long periods of time, you may need to make changes and take breaks.    [place] for childbirth and parenting classes, including an infant CPR class. Breastfeeding classes are recommended too.    Childbirth and Parenting Education:        Everyday Miracles:   https://www.everyday-miracles.org/    Free Video Series from [facility]: https://nursing.[facility]/academics/specialty-areas/nurse-midwifery/having-baby-prenatal-videos/having-baby-prenatal-and    [facility]: http://[facility] - LorisTrendr.Vivacta/   ([phone]-[phone]  [place]: (education, yoga & wellness) www.GridPointaXY Mobile  Enlightened Mama: www.enlightenedmama.Vivacta   Childbirth collective: (Parent topic nights)  www.childbirthcollective.org/  Hypnobabies:  www.hypnobabiestBoomBoom Prints.Vivacta/  Hypnobirthing:  Http://hypnobirthing.Vivacta/  The Birth Hour: https://DigePrint/online-childbirth-class/    APPS and Podcasts:   Ping López Nurture    Evidence Based Birth  The Birth Hour (for birth stories)   Birthful   Expectful   The Longest Shortest Time  PregnancyPodcast Briseida Leroy    Book Recommendations:   Angela Alexi's Birthing From Within--first few chapters include a new-age tone, you may prefer to skip it and keep going, because there is good stuff later.  This book recommendation covers emotional preparation, but does cover coping with pain, and use of both pharmacological and nonpharmacological methods.    Dr. Barger' The Pregnancy Book and The Birth Book--the pregnancy book goes month-by month      The Birth Partner by Christie Liu    Womanly Art of Breastfeeding by La Leche League International   Bestfeeding by Elma Grossman--great pictures    Mothering Your Nursing Toddler, by Nazia Cramer.   Addresses dealing with so many of the challenging behaviors of a nursing toddler.  How Weaning Happens, by La Leche League.  Discusses weaning at all ages, from medically necessary weaning of an infant, all the way up to age 5 (or older), with why/why not, and strategies.  Very empowering book both for deciding to wean and deciding not to.    American College of Nurse-Midwives (ACNM) http://www.midwife.org/; look at the informational handouts at  "http://www.midwife.org/Share-With-Women     www.mymidwife.org    Mother to Baby (Medication and Herbal guidance in pregnancy): http://www.mothertobaby.org  Toll-Free Hotline: 813.292.1144  LactMed (Medication use while breastfeeding): http://toxnet.nlm.nih.gov/newtoxnet/lactmed.htm    Women's Health.gov:  http://www.womenshealth.gov/a-z-topics/index.html    American pregnancy association - http://americanpregnancy.org    Centering Pregnancy (group prenatal care option): http://centeringhealthcare.org    Information about doulas:  Childbirth collective: http://www.childbirthcollective.org/  Doulas of [place] (LATHA):  www.latha.org  [facility]  project: http://Golden Star Resourcestiesdoulaproject.com/     Early Childhood and Family Education (ECFE):  ECFE offers parents hands-on learning experiences that will nourish a lifetime of teachable moments.  http://ecfe.info/ecfe-home/    March of Dimes www.marchofAgilence.com     FDA - Nutrition  www.mypyramid.gov  Under \"For Consumers,\" click on \"pregnant and breastfeeding women.\"      Centers for Disease Control and Prevention (CDC) - Vaccines : http://www.cdc.gov/vaccines/       When researching information on the web, question the validity of websites.  The domains .gov, .edu and.org tend to be more reliable information.  If there are a lot of advertisements, be cautious of the information provided. Stay away from blogs and chat rooms please!    How can you care for yourself at home?   You can refer to the Starting Out Right book or find it online at http://www.[facility]altheast.org/images/stories/maternity/[facility]-Starting-Out-Right.pdf or http://www.healtheast.org/images/stories/flipbooks/[facility]-starting-out-right/[facility]-starting-out-right.html#p=8     You can sign up for a weekly parenting e-mail that gives support, tips and advice from health care professionals that starts with pregnancy and continues through the toddler years. To register, go to " www.healtheast.org/baby at any time during your pregnancy.      Baby Feeding in the Hospital: Information, Support and Resources    As you prepare for the birth of your child, you will want to consider options for feeding your baby including breast-feeding and/or baby formula. The American Academy of Pediatrics recommends exclusive breast-feeding for the first six months (although any amount of breast-feeding is beneficial).  However, we also understand that breast-feeding is a personal choice and not for everyone. Whether or not you choose to breast-feed, your decision will be respected by our staff.    There are numerous benefits of breast-feeding; here are a few to consider:  Provides antibodies to protect your baby from infections and diseases  The cost: formula can cost over $1,500 per year  Convenience, no warming up or sterilizing bottles and supplies  The physical contact with breastfeeding can make babies feel secure, warm and comforted    What ever my feeding choice, what can I expect after I deliver my baby?  Your baby will usually be placed skin-to-skin immediately following birth. The skin to skin contact between you and your baby will be a special and memorable time. The bonding and attachment comforts your baby and has a positive effect on baby s brain development.   Having your baby  room in  with you also helps you start to learn your baby s body rhythms and sleep cycle.    You will also begin to learn your baby s cues (signals) that he or she is ready to feed.    When do I start to feed my baby?  As soon as possible after your baby s birth, you will be encouraged to begin feeding.  In the first couple of weeks, your baby will eat often.  Breastfeeding babies usually eat at least 8 times in 24 hours.  Babies fed formula usually eat at least 7 times in 24 hours.      Breast-feeding tips:  Get comfortable and use pillows for support.  Have your baby at the level of your breast, facing you,  tummy to  tummy .    Touch your nipple to your baby s lips so you baby s mouth opens wide (rooting reflex).  Aim the nipple toward the roof of your baby s mouth. When your baby opens his or her mouth, pull your baby toward your breast to help your baby  latch on  to your nipple and much of the areola area.  Hand expressing your breast milk can assist with latching your baby to your breast, if needed.  Ask for help, breastfeeding may seem awkward or uncomfortable at first, this is normal. There are numerous resources available at [facility], Clinics and beyond.   If your goal is to exclusively breastfeed, avoid using any formula or artificial nipples (including bottles and pacifiers) while you are your baby are learning to breastfeed unless there is a medical reason.     Mixing breastfeeding and formula can interfere with how you begin building your milk supply.  It can impact how you and your baby  learn  to breastfeeding together and alter the natural growth of  good  bacteria in your baby s stomach.  Delay a pacifier or a bottle in the first few weeks until breastfeeding is well established. This is often around 3 weeks of age.  Ask your nurse to show you how to hand express.   Breast milk can be kept in the refrigerator or freezer for later use.        Touring the Maternity Care Center  [facility] Maternity Care:   https://[facility].org/locations/the-birthplace[facility] Maternity Care:   https://[facility].org/locations/the-birthplace-at[facility]    Hospital and Clinic  Resources:  -Schedule an appointment with a [facility] Nurse Midwives [facility]   CNMAITE who is also a Lactation Consultant by calling [phone]     -Schedule a clinic appointment with a [facility] Nurse Midwives [facility] BERKLEY with dedicated clinic hours for breastfeeding assistance by calling [phone]. Breastfeeding clinic visits  are at [facility] on Mondays, [facility] on Tuesdays and [facility] on Thursdays.       [facility] Baby Café  Due to COVID-19, all Baby Café sessions are canceled until further notice. For lactation support, please contact one of our bilingual staff:  Audra (IBCLC) [phone]  Radha (IBCLC/ Amharic) [phone]  Marzena (Hmong) [phone]  Gomez ([place] Islander) [phone]  Baby Café is a free, drop-in service offering breastfeeding/chestfeeding support. Come share tips and socialize with other pregnant, breastfeeding/chestfeeding families. Babies and siblings are welcome (no  available).  We offer:  Professionally trained lactation staff.  Resource books for lending.  Relaxed and fun atmosphere.  Refreshments.  Locations  Baby Café is offered at several locations.  Please see below for the Baby Café closest to you.  CANCELED UNTIL FURTHER NOTICE  [facility]  [address] [address]  1st Wednesdays of the month   10 a.m. - Noon  CANCELED UNTIL FURTHER NOTICE  [facility] [address], [place], [address]  4th Wednesdays of the month   10 a.m. - 12:30 p.m.     CANCELED UNTIL FURTHER NOTICE  [facility]  [address], [place], [address]  4th Wednesdays of the month  4 - 6 p.m.  CANCELED UNTIL FURTHER NOTICE  [facility] [address], [place], [address]  2nd Thursdays of the month.  9:30-11:30 a.m.  Enter through the east end of the building, the blue Door C.  Ring the ECFE buzzer to be let in.   More information  Radha Cantor  [phone]  [email or web address]     -Attend a baby weigh in at [facility].  Lactation consultants are available to answer questions  [place]: Tuesdays 1:00 - 2:00  [facility]: Mondays 1:00 - 2:00  www.[facility] Internet.Alinto    -Attend one of the New Mama groups at [facility].  [facility] also offers one-on-one in home and in  office lactation consults.   www.enlightenedmama.com    -Attend a Myah Kimball meeting.  Multiple groups in several locations throughout the [facility]. The meetings are no-cost and always informative breastfeeding education session through Internatal La Leche League  Www.carol.org/  Medication use while breastfeeding: http://toxnet.nlm.nih.gov/newtoxnet/lactmed.htm     Online Resources:  [facility].org/baby sign up for free online weekly e-mail  [facility].org/maternity  Breastfeedingmadesimple.com  li.org (La Leche League)  Normalfed.com  Womenshealth.gov/breastfeeding  Workandpump.com    Breast-feeding Supplies & Pumps:  Talk to your insurance provider or WIC (Women, Infants and Children) to learn more about options available to you. Recent health insurance changes may include additional coverage for supplies and pumps.    Public Health:  Women, Infants and Children Nutrition program (WIC): provides breast-feeding support and education in addition to formal feeding moms. 988-UPQ-8844 or http://www.health.[facility].mn.us/divs/fh/wic    Family Health Home Visiting: Public [facility] Nurse home visits are available. Talk to your provider to see if you qualify. Most [facility] have a program available.    Additional Resources:  La Leche League is an international, nonprofit, nonsectarian organization offering information, education, and support to mothers who want to breast-feed their babies. Local groups offer phone help and monthly meetings. Visit [employer].iMeigu or Aentropico.iMeigu and us the  Find local support  drop down menu or click on the  Resources  tab.    [place] Breastfeeding Resources: 5-913-280-BABY (2229) toll free    National Breastfeeding Help Line trained breastfeeding peer counselors can help answer common breast-feeding questions by phone. Monday-Friday: English/Greek  9-531- [phone] toll free, [phone] (TTY)    [facility] Connection: 464-858-[facility] (8813)      Virtual Breastfeeding  "Support:    During this time of isolation, breastfeeding families need even more community!  Here are some area organizations offering virtual support groups for breastfeeding:    [employer] Support Group,  at 10:30 am   Run by REGINA Gloria of [employer] [facility] [facility]   Go to [employer] [facility] [facility] Facebook page and click on \"events\" for link   [email or web address]  [facility] Milk Hour,  at 2:30 pm    Run by REGINA Payan   Go to [facility] FB page and send message to get link   [email or web address]und[facility]/  [facility] holding virtual meetings the first Tuesday of each month, 8-9 pm, and the   Third Saturday, 10 - 11 am.  Go to [facility] and [place] FB page; message to get link [email or web address] offers a Lactation Lounge every Friday 12pm - 1pm, run by Kayla SpencerSouth Texas Spine & Surgical Hospitalginna Leader   Sign up via link at "[employer]/cbe-lactation   [email or web address]://www."[employer]/cbe-lactation  [facility] is offering virtual support groups every Monday, 10:30 am - 12 pm, run by nurse IBCLC   [email or web address]    Prenatal Breastfeeding Classes:      [facility] is offering virtual breastfeeding and  care classes:  https://www."[employer]/education-workshops  [employer] childbirth and breastfeeding education offering virtual prenatal breastfeeding classes  [email or web address]    "

## 2022-09-09 ENCOUNTER — MYC MEDICAL ADVICE (OUTPATIENT)
Dept: MIDWIFE SERVICES | Facility: CLINIC | Age: 22
End: 2022-09-09

## 2022-09-09 ENCOUNTER — TELEPHONE (OUTPATIENT)
Dept: MIDWIFE SERVICES | Facility: CLINIC | Age: 22
End: 2022-09-09

## 2022-09-09 ENCOUNTER — DOCUMENTATION ONLY (OUTPATIENT)
Dept: MIDWIFE SERVICES | Facility: CLINIC | Age: 22
End: 2022-09-09

## 2022-09-09 NOTE — TELEPHONE ENCOUNTER
"PHONE NOTE: Patient had a question regarding her 20-week fetal anatomy survey.  Patient saw \"choroid plexus cyst \"and was concerned.  Discussed with the patient that all other parts of the ultrasound were considered normal.  Usually in the absence of any other anomalies, a choroid plexus cyst is benign according to the radiologist.  This is a \"small left-sided choroid plexus cyst \".  Radiologist recommends \"continued attention on follow-up. otherwise normal fetal survey \".  So usually we do not follow-up with an ultrasound but in this case because the radiologist recommends it, we will offer a 24 or 28-week follow-up ultrasound to check choroid plexus cyst.  Patient chooses 28-week follow-up ultrasound.  Also offered MFM consultation which patient declined at this time.  Also stated that there are no guarantees but as stated above, usually in the absence of other issues or anomalies, this is of little clinical significance.  Patient is encouraged to call with questions or issues.  [phone]  "

## 2022-09-15 ENCOUNTER — MEDICAL CORRESPONDENCE (OUTPATIENT)
Dept: HEALTH INFORMATION MANAGEMENT | Facility: CLINIC | Age: 22
End: 2022-09-15

## 2022-09-18 ENCOUNTER — HOSPITAL ENCOUNTER (OUTPATIENT)
Facility: CLINIC | Age: 22
End: 2022-09-18
Admitting: ADVANCED PRACTICE MIDWIFE
Payer: COMMERCIAL

## 2022-09-18 ENCOUNTER — HOSPITAL ENCOUNTER (OUTPATIENT)
Facility: CLINIC | Age: 22
Discharge: HOME | End: 2022-09-18
Attending: OBSTETRICS & GYNECOLOGY | Admitting: ADVANCED PRACTICE MIDWIFE
Payer: COMMERCIAL

## 2022-09-18 ENCOUNTER — APPOINTMENT (OUTPATIENT)
Dept: ULTRASOUND IMAGING | Facility: CLINIC | Age: 22
End: 2022-09-18
Attending: ADVANCED PRACTICE MIDWIFE
Payer: COMMERCIAL

## 2022-09-18 PROBLEM — Z36.89 ENCOUNTER FOR TRIAGE IN PREGNANT PATIENT: Status: ACTIVE | Noted: 2022-09-18

## 2022-09-18 PROCEDURE — 76816 OB US FOLLOW-UP PER FETUS: CPT

## 2022-09-18 PROCEDURE — G0463 HOSPITAL OUTPT CLINIC VISIT: HCPCS

## 2022-09-18 RX ORDER — LIDOCAINE 40 MG/G
CREAM TOPICAL
Status: DISCONTINUED | OUTPATIENT
Start: 2022-09-18 | End: 2022-09-18 | Stop reason: HOSPADM

## 2022-09-18 ASSESSMENT — ACTIVITIES OF DAILY LIVING (ADL): ADLS_ACUITY_SCORE: 37

## 2022-09-18 NOTE — PROGRESS NOTES
, EDC 2023 presents to [facility] triage accompanied by partner with complaints of decreased fetal movement. Patient anxiou and crying. Doptones 130. Patient greatly releived upon hearing FHR.  Ellie Lin CNM notified of above findings. Ultrasound ordered. All WNL. Ellie Ribera CNM notified of above findings. Patient will discharge to home and follow up in clinic.

## 2022-09-18 NOTE — DISCHARGE INSTRUCTIONS
Discharge Instruction for Undelivered Patients      You were seen for: Fetal Assessment  We Consulted: Ellie Lin CNM  You had (Test or Medicine):fetal doptones, ultrasound     Diet:   Drink 8 to 12 glasses of liquids (milk, juice, water) every day.  You may eat meals and snacks.     Activity:  Count fetal kicks everyday (see handout)  Call your doctor or nurse midwife if your baby is moving less than usual.     Call your provider if you notice:  Swelling in your face or increased swelling in your hands or legs.  Headaches that are not relieved by Tylenol (acetaminophen).  Changes in your vision (blurring: seeing spots or stars.)  Nausea (sick to your stomach) and vomiting (throwing up).   Weight gain of 5 pounds or more per week.  Heartburn that doesn't go away.  Signs of bladder infection: pain when you urinate (use the toilet), need to go more often and more urgently.  The bag of ferreira (rupture of membranes) breaks, or you notice leaking in your underwear.  Bright red blood in your underwear.  Abdominal (lower belly) or stomach pain.  For first baby: Contractions (tightening) less than 5 minutes apart for one hour or more.  Second (plus) baby: Contractions (tightening) less than 10 minutes apart and getting stronger.  *If less than 34 weeks: Contractions (tightening) more than 6 times in one hour.  Increase or change in vaginal discharge (note the color and amount)  Other:    Follow-up:  As scheduled in the clinic

## 2022-09-30 ENCOUNTER — MEDICAL CORRESPONDENCE (OUTPATIENT)
Dept: HEALTH INFORMATION MANAGEMENT | Facility: CLINIC | Age: 22
End: 2022-09-30

## 2022-10-10 ENCOUNTER — PRENATAL OFFICE VISIT (OUTPATIENT)
Dept: MIDWIFE SERVICES | Facility: CLINIC | Age: 22
End: 2022-10-10
Payer: COMMERCIAL

## 2022-10-10 VITALS
DIASTOLIC BLOOD PRESSURE: 70 MMHG | WEIGHT: 218.2 LBS | SYSTOLIC BLOOD PRESSURE: 116 MMHG | BODY MASS INDEX: 36.35 KG/M2 | HEART RATE: 88 BPM | HEIGHT: 65 IN

## 2022-10-10 DIAGNOSIS — Z34.01 SUPERVISION OF NORMAL FIRST PREGNANCY IN FIRST TRIMESTER: Primary | ICD-10-CM

## 2022-10-10 LAB
GLUCOSE 1H P 50 G GLC PO SERPL-MCNC: 88 MG/DL (ref 70–129)
HGB BLD-MCNC: 11.8 G/DL (ref 11.7–15.7)

## 2022-10-10 PROCEDURE — 82950 GLUCOSE TEST: CPT | Performed by: MIDWIFE

## 2022-10-10 PROCEDURE — 36415 COLL VENOUS BLD VENIPUNCTURE: CPT | Performed by: MIDWIFE

## 2022-10-10 PROCEDURE — 86780 TREPONEMA PALLIDUM: CPT | Performed by: MIDWIFE

## 2022-10-10 PROCEDURE — 85018 HEMOGLOBIN: CPT | Performed by: MIDWIFE

## 2022-10-10 PROCEDURE — 99207 PR PRENATAL VISIT: CPT | Performed by: MIDWIFE

## 2022-10-10 NOTE — PROGRESS NOTES
Cassidy presents to clinic alone today for a routine prenatal visit.    Cassidy had questions regarding fetal movement, expressed anxiety for irregular FM that caused her to be seen in ED on 9/18. Patient also noted significant life stressors related to moving recently. Reassurance provided, discussed normal expectations for fetal movement and signs for concern. Patient has been experiencing low back and pelvic pain, specifically on right side. Discussed that this is a normal pregnancy experience, offered suggestions for pain relief and prevention including pelvic support belt, comfort options. Order placed for abdominal support belt. Reported difficulty sleeping due to discomfort & leg cramps, discussed the possibility of starting a magnesium supplement at bedtime.    Fundal height 25 cm, +FM, .   GCT completed 10/10, Passed   Flu shot at 28 wk visit.   Discussed f/u US for choroid plexus cyst, patient unsure if she desires. Order placed, will self-schedule as needed.

## 2022-10-10 NOTE — PATIENT INSTRUCTIONS
"[facility] Nurse Midwives [facility]  Contact information:  Appointment line and to get a hold of CNM in clinic Monday-Friday 8 am - 5 pm:  [phone].  There are some clinics with early start times (1st appointment 7:40 am) and others with evening hours (last appointment 6:20 pm).  Most are typically open from 8 am to 5 pm.    CNM on call answering service: [phone].  Specify your hospital of choice and leave a brief message for CNM;  will then page CNM who is on call at your specified hospital and you should receive a call back with 15 minutes.  Be sure that your ringer is audible and that you can accept blocked calls so that we can get back in touch with you! This number should be reserved for urgent needs if during the day, before 8 am, after 5 pm, weekends, holidays.    Contact the on-call CNM with warning signs, such as:  vaginal bleeding   Vaginal discharge and itching or pain and burning during urination  Leg/calf pain or swelling on one side  severe abdominal pain  nausea and vomiting more than 4-5 times a day, or if you are unable to keep anything down  fever more than 100.4 degrees F.     Budgehart  After each of your visits you are welcome to check [facility] for your visit summary including education and links to information relevant to your pregnancy and/or well woman care.   Find the \"Visits\" tab at the top of the page, you will see a list of recent visits and for each visit a for link for \"View After Visit Summary.\" View of your After Visit Summary will allow you to read our recommendations from your visit, review any education provided, and link to websites with useful information.   If you have any questions or difficulty navigating [facility] [employer], please feel free to contact us and we will do our best to direct you.     Meet the Midwives from [facility]  You are invited to an informational meet and greet with [facility]'s [facility] Certified Nurse-Midwives. " "Our free \"Meet the Midwives\" event is a great opportunity to learn about our midwives' philosophy and experience, the hospitals where we can assist with your birth, and answer questions you may have. Partners, friends, and family are welcome to attend. Currently, this is a virtual event.  Date  First Tuesday of every month at 7 pm.    Link to next (live) meeting  https://www.LearnShark.Muses Labs/classes-and-events/meet-the-midwives-from-[facility]  To Join by Telephone (audio only) Call:   [phone] Phone Conference ID: [phone]#        [facility] Maternity Care:   https://MeilleurMobile.Muses [employer]/locations/the-birthplace-[facility] Care:   https://USB Promos/locations/the-birthplace-at[facility]       DAILY FETAL MOVEMENT COUNTING    One of the best ways to keep track of a healthy baby is to notice its movements. Healthy babies are very active. However, some perfectly normal babies may sleep quietly as long as 60 minutes without moving. Babies who are having problems are sluggish and move less. Counting these movements can provide your nurse-midwife with a warning of developing problems.    You should begin this counting at the around your 7th to 8th month of your pregnancy (28-32 weeks) if you are ever concerned that there is less movement than normal for your baby.     INSTRUCTIONS    1.  If able, drink 2 glasses of fluid and lie down on one side.  Put your hand on your abdomen.  2. Count 10 separate times that the baby moves. A movement may be a kick, turn, or flip of the baby.  3.  Record the time you feel the 10th movement. When you count the 10th movement, stop counting.  4.  If one hour passes with less than 10 movements, drink a large glass of fruit juice. Then count for the another hour. If you do not reach 10 movements, call the midwives    REMEMBER    The baby " may move all 10 times in an hour or less.  The baby may take up to five hours to move 10 times.  The important thing is to know what is normal for your baby so you can tell your nurse-midwife when something different is happening.    CALL THE NURSE-MIDWIFE IF:    You do not feel 10 movements in five hours.  You have not felt the baby move all day.  It is taking longer and longer each day to get the 10th movement.      Pregnancy: Your Third Trimester Changes  How You Are Changing  Your body is preparing for the birth of your baby. Some of the most common changes are listed below. If you have any questions or concerns, ask your midwife.  You ll gain more weight from fluids, extra blood, and fat deposits. Your baby will gain an average of an ounce per day (a half a pound a week)!  Your breasts will grow as your body gets ready to feed the baby. They may be more tender. You may also notice a slight yellow or white discharge from the nipples.  Discharge from your vagina may increase. This is normal.  You might see some skin color changes on your forehead, cheeks, or nose. Most of these will go away after you deliver.  How Your Baby Is Growing    Month 7  Your baby is about 14 inches long. If born prematurely (too early), your baby would likely survive with special care.   Month 8  Your baby is building up body fat. Baby kicks strongly, but is getting too big to move around much.   Month 9  Your baby weighs nearly 7 pounds and is about 18-20 inches long. In other words, any day now...       UNDERSTANDING  LABOR    Going into labor before your 37th week of pregnancy is called  labor.  labor can cause your baby to be born too soon. This can lead to a number of health problems that may affect your baby. From 28-35 weeks, Patients are advised to be evaluated at [facility] since they have a  Intensive Care Unit (NICU).  -Before labor, the cervix is thick and closed.  -In   labor, the cervix begins to efface (thin) and dilate (open) over a short period of time    Are You At Risk?  Any pregnant woman can have  labor. It may start for no reason. But these risk factors can increase your chances:  Past  labor or past early birth  Smoking and drug or alcohol use during pregnancy  Multiple fetuses (twins or more)  Problems with the shape of the uterus  Bleeding during the pregnancy    The Dangers of  Birth  A baby born too soon may have health problems. This is because the baby didn t have enough time to mature. The baby then is at risk of:  Not breastfeeding well  Having immature lungs  Bleeding in the brain  Dying    Reaching Term  Your goal is to get as close to term as you can before giving birth. The closer you get to term, the higher your chance of having a healthy baby. Work with your healthcare provider. Together, you can take steps that may keep you from giving birth too early.    Symptoms of  Labor  If you believe you re having  labor, contact the midwives right away. But contractions alone don t mean you re in  labor. What matters more are changes in your cervix (the lower end of the uterus).   Symptoms of  labor include:  five or more contractions per hour  Strong & frequent contractions  Constant menstrual-like cramping  Low-back pain    Mucous or bloody vaginal discharge  Bleeding or spotting in the second or third trimester    Evaluating  Labor  Your midwife will try to find out whether you re in  labor or whether you re just having contractions.She may watch you for a few hours. The following tests may be done:  Pelvic exam to see if your cervix has effaced (thinned) and dilated (opened)  Uterine activity monitoring to detect contractions  Fetal monitoring to check the health of your baby  Ultrasound to check your baby s size and position    Caring for Yourself At Home  If you have contractions  but  your cervix is still thick and closed, the midwife may ask you to do the following at home:  Drink plenty of water.  Do fewer activities.  Rest in bed on your side.  Avoid intercourse and nipple stimulation.    When to Call Your Midwife  Five or more contractions per hour  Bag of water breaks  Bleeding or spotting     If You Need Hospital Care   labor often requires that you have hospital care and complete bed rest. You may have an IV (intravenous) line to get fluids. And you may be given pills or an injection to help prevent contractions. Finally, you may receive medication (corticosteroids) that helps your baby s lungs mature more quickly.    Syphilis Screening:   The [place] Department of Health ([facility]) provided new recommendations for screening during pregnancy. If you are pregnant, [facility] recommends testing three times during your pregnancy: at your first visit, 28 weeks, and after delivery.   Syphilis is:   Caused by a bacteria spread by sexual contact  Rising among [place] women of child-bearing age  Syphilis can:   Be passed on to infants during pregnancy or during delivery and can be life threatening  Be cured. There are ways to protect yourself and your babies.        HEALTHY PREGNANCY CARE: 26-30 WEEKS PREGNANT    You are now in your last trimester of pregnancy. Your baby is growing rapidly, can open and close her eyelids, sometimes get hiccups, and you'll probably feel her moving around more often. Your baby has breathing movements and is gaining about one ounce each day. You may notice heartburn and leg cramps. Your back may ache as your body gets used to your baby's size and length.    Discuss your work situation with your midwife or physician as needed. If you stand for long periods of time, you may need to make changes and take breaks.    Pre-register after 30 weeks online at the hospital where your baby will be born [email or web address]      Be aware of your  baby's activity level. You may be asked to do daily fetal movement counts. Contact your midwife or physician about any decreased movement.    You may have been tested for gestational diabetes today. If you are RH negative, you may have had an additional test and a Rhogam injection.    Consider receiving a Tdap vaccination to protect your baby from Pertussis/whooping cough.    Baby Feeding in the Hospital: Information, Support and Resources    As you prepare for the birth of your child, you will want to consider options for feeding your baby including breast-feeding and/or baby formula. The American Academy of Pediatrics recommends exclusive breast-feeding for the first six months (although any amount of breast-feeding is beneficial).  However, we also understand that breast-feeding is a personal choice and not for everyone. Whether or not you choose to breast-feed, your decision will be respected by our staff.    There are numerous benefits of breast-feeding; here are a few to consider:  Provides antibodies to protect your baby from infections and diseases  The cost: formula can cost over $1,500 per year  Convenience, no warming up or sterilizing bottles and supplies  The physical contact with breastfeeding can make babies feel secure, warm and comforted    What ever my feeding choice, what can I expect after I deliver my baby?  Your baby will usually be placed skin-to-skin immediately following birth. The skin to skin contact between you and your baby will be a special and memorable time. The bonding and attachment comforts your baby and has a positive effect on baby s brain development.   Having your baby  room in  with you also helps you start to learn your baby s body rhythms and sleep cycle.    You will also begin to learn your baby s cues (signals) that he or she is ready to feed.    When do I start to feed my baby?  As soon as possible after your baby s birth, you will be encouraged to begin feeding.  In the  first couple of weeks, your baby will eat often.  Breastfeeding babies usually eat at least 8 times in 24 hours.  Babies fed formula usually eat at least 7 times in 24 hours.      Breast-feeding tips:  Get comfortable and use pillows for support.  Have your baby at the level of your breast, facing you,  tummy to tummy .    Touch your nipple to your baby s lips so you baby s mouth opens wide (rooting reflex).  Aim the nipple toward the roof of your baby s mouth. When your baby opens his or her mouth, pull your baby toward your breast to help your baby  latch on  to your nipple and much of the areola area.  Hand expressing your breast milk can assist with latching your baby to your breast, if needed.  Ask for help, breastfeeding may seem awkward or uncomfortable at first, this is normal. There are numerous resources available at [facility], [facility] and beyond.   If your goal is to exclusively breastfeed, avoid using any formula or artificial nipples (including bottles and pacifiers) while you are your baby are learning to breastfeed unless there is a medical reason.     Mixing breastfeeding and formula can interfere with how you begin building your milk supply.  It can impact how you and your baby  learn  to breastfeeding together and alter the natural growth of  good  bacteria in your baby s stomach.  Delay a pacifier or a bottle in the first few weeks until breastfeeding is well established. This is often around 3 weeks of age.  Ask your nurse to show you how to hand express.   Breast milk can be kept in the refrigerator or freezer for later use.    Hospital and Clinic Resources:  -Schedule an appointment with a [facility] CNM who is also a Lactation Consultant by calling [phone]     -Schedule a clinic appointment with a [facility] CNM with dedicated clinic hours for breastfeeding assistance by calling [phone]. Breastfeeding clinic visits are at [facility] on Mondays, [facility] on  Tuesdays and [facility] on Thursdays.       [facility] Baby Café  Due to COVID-19, all Baby Café sessions are canceled until further notice. For lactation support, please contact one of our bilingual staff:  Audra (IBCLC) [phone]  Radha (IBCLC/ Persian) [phone]  Zoslava (Hmong) [phone]  Gomez (Mexican) [phone]  Baby Café is a free, drop-in service offering breastfeeding/chestfeeding support. Come share tips and socialize with other pregnant, breastfeeding/chestfeeding families. Babies and siblings are welcome (no  available).  We offer:  Professionally trained lactation staff.  Resource books for lending.  Relaxed and fun atmosphere.  Refreshments.  Locations  Baby Café is offered at several locations.  Please see below for the Baby Café closest to you.  CANCELED UNTIL FURTHER NOTICE  [facility]  [address] [address]  1st Wednesdays of the month   10 a.m. - Noon  CANCELED UNTIL FURTHER NOTICE  [facility] [address], [place], [address]  4th Wednesdays of the month   10 a.m. - 12:30 p.m.     CANCELED UNTIL FURTHER NOTICE  [facility]  [address], [place], [address]  4th Wednesdays of the month  4 - 6 p.m.  CANCELED UNTIL FURTHER NOTICE  [employer] [facility] [address], [place], [facility]  2nd Thursdays of the month.  9:30-11:30 a.m.  Enter through the east end of the building, the blue Door C.  Ring the ECFE buzzer to be let in.   More information  Radha Cantor  [phone]  [email or web address]     -Attend a baby weigh in at [facility].  Lactation consultants are available to answer questions  [facility]: Tuesdays 1:00 - 2:00  [facility]: Mondays 1:00 - 2:00  www.[facility].Motionbox    -Attend one of the New Mama groups at [facility] in [facility]  Enlightened Mama also offers one-on-one in home and in office lactation consults.    www.[facility] [facility] [facility].com    -Attend a Myah Kimball meeting.  Multiple groups in several locations throughout the [facility]. The meetings are no-cost and always informative breastfeeding education session through Internatal La Leche League  Www.carol.org/  Medication use while breastfeeding: http://toxnet.nlm.nih.gov/newtoxnet/lactmed.htm     Online Resources:  [facility]altheast.org/baby sign up for free online weekly e-mail  [facility].org/maternity  Breastfeedingmadesimple.com  li.org (La Leche League)  Normalfed.com  Womenshealth.gov/breastfeeding  Workandpump.com    Breast-feeding Supplies & Pumps:  Talk to your insurance provider or WIC (Women, Infants and Children) to learn more about options available to you. Recent health insurance changes may include additional coverage for supplies and pumps.    Public Health:  Women, Infants and Children Nutrition program (WIC): provides breast-feeding support and education in addition to formal feeding moms. 964-BJF-1320 or http://www.health.[facility].us/divs/fh/wic    Family Health Home Visiting: [facility] Nurse home visits are available. Talk to your provider to see if you qualify. Most [facility] have a program available.    Additional Resources:  La Leche League is an international, nonprofit, nonsectarian organization offering information, education, and support to mothers who want to breast-feed their babies. Local groups offer phone help and monthly meetings. Visit CrowdBouncer.Red Swoosh or Etherstack.Red Swoosh and us the  Find local support  drop down menu or click on the  Resources  tab.    [place] Breastfeeding Resources: 0-678-589-BABY (2229) toll free    National Breastfeeding Help Line trained breastfeeding peer counselors can help answer common breast-feeding questions by phone. Monday-Friday: English/Mohawk  [phone]- [phone] toll free, [phone] (TTY)    [facility] Connection: [phone]-[facility] (0804)      Resources:    Childbirth and Parenting Education:        Everyday Miracles:   https://www.everyday-miracles.org/    Free Video Series from [facility]: https://nursing.[facility]/academics/specialty-areas/nurse-midwifery/having-baby-prenatal-videos/having-baby-prenatal-and    [facility]: http://[facility] DillonCoFluent Design.Jigsee/   (603) 520-KAAG  [place]: (education, yoga & wellness) www.[employer]Essence [employer]  Enlightened Mama: www.enlightenedmama.Jigsee   Childbirth collective: (Parent topic nights)  www.childbirthcollective.org/  Hypnobabies:  www.hypnobabiest"OmbuShop, Tu Tienda Online".Jigsee/  Hypnobirthing:  Http://hypnobirthing.Jigsee/  The Birth Hour: https://Capturion Network/online-childbirth-class/    APPS and Podcasts:   Ping López Nurture    Evidence Based Birth  The Birth Hour (for birth stories)   Birthful   Expectful   The Longest Shortest Time  PregnancyPodcast Briseida Leroy    Book Recommendations:   Angela Alexi's Birthing From Within--first few chapters include a new-age tone, you may prefer to skip it and keep going, because there is good stuff later.  This book recommendation covers emotional preparation, but does cover coping with pain, and use of both pharmacological and nonpharmacological methods.    Dr. Barger' The Pregnancy Book and The Birth Book--the pregnancy book goes month-by month      The Birth Partner by Christie Liu    Womanly Art of Breastfeeding by La Leche League International   Bestfeeding by Elma Grossman--great pictures    Mothering Your Nursing Toddler, by Nazia Cramer.   Addresses dealing with so many of the challenging behaviors of a nursing toddler.  How Weaning Happens, by La Leche League.  Discusses weaning at all ages, from medically necessary weaning of an infant, all the way up to age 5 (or older), with why/why not, and strategies.  Very empowering book both for deciding to wean and deciding not to.    American College of Nurse-Midwives (ACNM) http://www.midwife.org/; look at the informational handouts at  "http://www.midwife.org/Share-With-Women     www.mymidwife.org    Mother to Baby (Medication and Herbal guidance in pregnancy): http://www.mothertobaby.org  Toll-Free Hotline: 721.263.4199  LactMed (Medication use while breastfeeding): http://toxnet.nlm.nih.gov/newtoxnet/lactmed.htm    Women's Health.gov:  http://www.womenshealth.gov/a-z-topics/index.html    American pregnancy association - http://americanpregnancy.org    Centering Pregnancy (group prenatal care option): http://centeringhealthcare.org    Information about doulas:  Childbirth collective: http://www.childbirthcollective.org/  Doulas of [place] (LATHA):  www.latha.org  [employer] [facility]  project: http://kaleocitiesdoulaproject.com/     Early Childhood and Family Education (ECFE):  ECFE offers parents hands-on learning experiences that will nourish a lifetime of teachable moments.  http://ecfe.info/ecfe-home/    March of Dimes www.nuvoTV.JumpCam     FDA - Nutrition  www.mypyramid.gov  Under \"For Consumers,\" click on \"pregnant and breastfeeding women.\"      Centers for Disease Control and Prevention (CDC) - Vaccines : http://www.cdc.gov/vaccines/       When researching information on the web, question the validity of websites.  The domains .gov, .edu and.org tend to be more reliable information.  If there are a lot of advertisements, be cautious of the information provided. Stay away from blogs and chat rooms please!             Virtual Breastfeeding Support:    During this time of isolation, breastfeeding families need even more community!  Here are some area organizations offering virtual support groups for breastfeeding:    Latch Cafe Support Group, Tuesdays at 10:30 am   Run by REGINA Gloria of [facility]act[facility]   Go to [facility]ispe[facility]act[facility] Facebook page and click on \"events\" for link   https://www.facebook.com/events/989557908980880/  [facility] Milk Hour, Thursdays at 2:30 pm    Run by " REGINA Payan   Go to [facility] FB page and send message to get link   https://www.GVISP 1.com/[facility] CenteresolidarundCoffey County [facility]/  Ya Kimball [facility] [facility] holding virtual meetings the first Tuesday of each month, 8-9 pm, and the   Third Saturday, 10 - 11 am.  Go to La Leche League [facility] and [facility] [employer] [facility] FB page; message to get link https://www.GVISP 1.Digonex Technologies/EvangelinafGJolynn/?hc_location=[facility]  [employer] offers a Lactation Lounge every Friday 12pm - 1pm, run by Ya Spencer Leader   Sign up via link at Paperlinks/cbe-lactation   https://www.Paperlinks/cbe-lactation  [facility] is offering virtual support groups every Monday, 10:30 am - 12 pm, run by nurse IBCLC   Https://www.GVISP 1.com/events/[email or web address]/    Prenatal Breastfeeding Classes:      [employer] is offering virtual breastfeeding and  care classes:  https://www.Paperlinks/education-workshops  BirthEd childbirth and breastfeeding education offering virtual prenatal breastfeeding classes  https://www.birthedmn.com/workshops

## 2022-10-11 LAB — T PALLIDUM AB SER QL: NONREACTIVE

## 2022-10-17 ENCOUNTER — HOSPITAL ENCOUNTER (OUTPATIENT)
Dept: ULTRASOUND IMAGING | Facility: HOSPITAL | Age: 22
Discharge: HOME | End: 2022-10-17
Attending: MIDWIFE | Admitting: MIDWIFE
Payer: COMMERCIAL

## 2022-10-17 DIAGNOSIS — Z34.01 SUPERVISION OF NORMAL FIRST PREGNANCY IN FIRST TRIMESTER: ICD-10-CM

## 2022-10-17 PROCEDURE — 76816 OB US FOLLOW-UP PER FETUS: CPT

## 2022-10-18 ENCOUNTER — DOCUMENTATION ONLY (OUTPATIENT)
Dept: OBGYN | Facility: CLINIC | Age: 22
End: 2022-10-18

## 2022-10-18 DIAGNOSIS — O35.03X0 CHOROID PLEXUS CYST OF FETUS AFFECTING CARE OF MOTHER, ANTEPARTUM, SINGLE OR UNSPECIFIED FETUS: Primary | ICD-10-CM

## 2022-11-03 ENCOUNTER — TELEPHONE (OUTPATIENT)
Dept: MIDWIFE SERVICES | Facility: CLINIC | Age: 22
End: 2022-11-03

## 2022-11-03 NOTE — TELEPHONE ENCOUNTER
Spoke with Cassidy regarding symptoms.  She is 28 weeks pregnant and for the past few days has sore throat, cough, fever off and on, some SOB (Which may be pregnancy related) and congestion. Has been taking Tylenol for fever.  Has test scheduled for COVID tomorrow.  Wants to discuss risks of COVID in pregnancy.  Reviewed both maternal and fetal risks, including increased risk for hospitalization and ICU admission as well as risks of possible FGR.  Reviewed supportive measures for symptoms including safe med options for sore throat and fever (Tylenol), and importance of notifying us if she does test positive so we can prescribe Paxlovid.  Cassidy has an OB appt on Monday. Advised that if she does test positive for COVID, we would recommend transition to virtual visit and CNM can review her symptoms.  Reviewed importance of fetal movement as well as how to perform fetal kick counts.  Danger s/sx reviewed, provided CNM on call phone number for any questions/concerns.    MARY Jones, CNM

## 2022-11-03 NOTE — TELEPHONE ENCOUNTER
Pt would like to talk to a CNM regarding the fact she feels she might have Covid. She has a test scheduled for tomorrow.

## 2022-11-04 ENCOUNTER — HOME INFUSION (PRE-WILLOW HOME INFUSION) (OUTPATIENT)
Dept: PHARMACY | Facility: CLINIC | Age: 22
End: 2022-11-04

## 2022-11-04 ENCOUNTER — TELEPHONE (OUTPATIENT)
Dept: MIDWIFE SERVICES | Facility: CLINIC | Age: 22
End: 2022-11-04

## 2022-11-04 ENCOUNTER — TELEPHONE (OUTPATIENT)
Dept: OBGYN | Facility: CLINIC | Age: 22
End: 2022-11-04

## 2022-11-04 DIAGNOSIS — Z34.01 SUPERVISION OF NORMAL FIRST PREGNANCY IN FIRST TRIMESTER: Primary | ICD-10-CM

## 2022-11-04 NOTE — TELEPHONE ENCOUNTER
Cassidy is a 22yr old  at 28w6d with +COVID test today, symptoms started Monday night/Tuesday morning. Pt requesting monoclonal antibody therapy. Referral placed on chart. Reviewed OTC meds for symptoms management. May go get puls oximeter at pharmacy, if testing at home and <90% oxygenated advised to go to ER for eval. Plan for growth ultrasound at 32 wk.

## 2022-11-04 NOTE — TELEPHONE ENCOUNTER
Pt tested + for Covid and would like to know next steps. She did switch her appt to telephone visit for next week

## 2022-11-07 ENCOUNTER — VIRTUAL VISIT (OUTPATIENT)
Dept: MIDWIFE SERVICES | Facility: CLINIC | Age: 22
End: 2022-11-07

## 2022-11-07 DIAGNOSIS — U07.1 COVID-19 AFFECTING PREGNANCY, ANTEPARTUM: ICD-10-CM

## 2022-11-07 DIAGNOSIS — O98.519 COVID-19 AFFECTING PREGNANCY, ANTEPARTUM: ICD-10-CM

## 2022-11-07 NOTE — PROGRESS NOTES
Phone visit scheduled at 29w2d due to recent COVID infection.    Unable to reach pt X 4 attempts.   Message left indicating reason for call.     Will have schedulers reach out to patient to reschedule prenatal visit, either via phone/video or in person depending on timing.      MARY Anglin, CNM, IBCLC  [facility]

## 2022-11-09 NOTE — PROGRESS NOTES
This is a recent snapshot of the patient's [facility] medical record.  For current drug dose and complete information and questions, call [phone] or In Basket pool, fv home infusion ([id number])  CSN Number:  [id number]

## 2022-11-10 ENCOUNTER — MYC MEDICAL ADVICE (OUTPATIENT)
Dept: MIDWIFE SERVICES | Facility: CLINIC | Age: 22
End: 2022-11-10

## 2022-11-10 NOTE — TELEPHONE ENCOUNTER
"PHONE NOTE: Cassidy sent a [employer] message asking about decreased fetal movement.  Patient was diagnosed with COVID on 11/4/22 but started symptoms on 11/2/2022.  She states that she was pretty sick and had a fever and was coughing a lot.  She took Tylenol to take down her fever.  She has not felt shortness of breath nor has she been able to  a pulse oximeter due to \"low on funds\".  She states that baby was quite active until Sunday and since then she has only felt smaller amounts of movement, maybe once an hour and then only a few times yesterday.  We talked about it today and gave her parameters for daily fetal movement counting.  Reassured patient that as long as she feels some movement after meals and at least a flurry of kicks approximately 10 kicks in 1 hour, once a day, that is sufficient.  Recommended that patient have a good meal and drink a glass of juice to give baby \"little sugar buzz\" and really focus and pay attention for kick counts in the half hour after eating.  Patient states that this call in response was reassuring for her.  Gave her parameters regarding fetal kick counts and she knows to call with any concerns.  Reviewed phone numbers for the on-call midwife phone number and the appointment line number.  "

## 2022-11-14 NOTE — PROGRESS NOTES
Cassidy presents to [facility] clinic for a routine prenatal visit at 30w4d. Feeling good now.  Patient was diagnosed with COVID on 22 but started symptoms on 2022.    US for growth and f/u choriod plexus cysts and nuchal cord (disc. Cord is normal).  Reports normal fetal movements. Discussed [facility]. Denies regular painful contractions, bleeding, leaking, changes in fetal movement.   Reviewed 28 week labs. (1 hr glucose 88, Hgb 11.8)  Questions about TDap--questions because she had monoclonal antibodies for Covid--wondering if TDap is ok to get now.  Pharmacist says no conflict between monoclonal antibodies and Tdap vaccine.  Patient would like to get this at her next visit.  [place] Tdap  Pt needs a work note specifying restrictions for lifting and hrs worked/breaks, etc.--sent in [employer]eles[employer] and snail mail  Looked at wt gain chart--more than recommended. Discussed healthy diet and exercise.    Reviewed our recommendation that she take an iron supplement daily to boost her iron stores prior to birth   Pt is currently supplementing daily; discussed strategies to manage constipation.     Pregnancy checklist addressed.   Reviewed  laborprecautions, warning signs and when/how to call the on-call CNM.   RTC 2 weeks.

## 2022-11-16 ENCOUNTER — MYC MEDICAL ADVICE (OUTPATIENT)
Dept: MIDWIFE SERVICES | Facility: CLINIC | Age: 22
End: 2022-11-16

## 2022-11-16 ENCOUNTER — PRENATAL OFFICE VISIT (OUTPATIENT)
Dept: MIDWIFE SERVICES | Facility: CLINIC | Age: 22
End: 2022-11-16
Payer: COMMERCIAL

## 2022-11-16 VITALS
OXYGEN SATURATION: 99 % | DIASTOLIC BLOOD PRESSURE: 70 MMHG | HEIGHT: 65 IN | HEART RATE: 88 BPM | WEIGHT: 227 LBS | SYSTOLIC BLOOD PRESSURE: 120 MMHG | BODY MASS INDEX: 37.82 KG/M2

## 2022-11-16 DIAGNOSIS — U07.1 INFECTION DUE TO 2019 NOVEL CORONAVIRUS: Primary | ICD-10-CM

## 2022-11-16 DIAGNOSIS — O09.90 HIGH-RISK PREGNANCY, UNSPECIFIED TRIMESTER: ICD-10-CM

## 2022-11-16 PROCEDURE — 99207 PR PRENATAL VISIT: CPT | Performed by: MIDWIFE

## 2022-11-16 NOTE — LETTER
November 16, 2022      Cassidy Fernandes  [address]   [facility] [address]        To Whom It May Concern:    Cassidy Fernandes was seen in our clinic today 11/16/22.  We are taking care of her for her prenatal care.  Patient is currently 30 weeks pregnant.  She has a weight lifting/pulling/sliding limit of 25 pounds.  She also should work no more than 8 hours/day, and this should include a morning break of 15 minutes and afternoon break of 15 minutes and a lunch break of 30 minutes.  Patient should be able to hydrate throughout her shift.  If she needs to sit down due to contractions or other complications she should be able to do this as needed.  If you have any questions, please call us at [phone].    Sincerely,        Akiko Rodriguez CNM, MARY Lim CNM  [facility]  Email: [email or web address]  Clinic: [phone]   Fax: [phone]

## 2022-11-20 ENCOUNTER — HEALTH MAINTENANCE LETTER (OUTPATIENT)
Age: 22
End: 2022-11-20

## 2022-11-21 ENCOUNTER — TELEPHONE (OUTPATIENT)
Dept: OBGYN | Facility: CLINIC | Age: 22
End: 2022-11-21

## 2022-11-21 ENCOUNTER — HOSPITAL ENCOUNTER (OUTPATIENT)
Facility: HOSPITAL | Age: 22
Discharge: HOME | End: 2022-11-21
Attending: ADVANCED PRACTICE MIDWIFE | Admitting: ADVANCED PRACTICE MIDWIFE
Payer: COMMERCIAL

## 2022-11-21 ENCOUNTER — HOSPITAL ENCOUNTER (OUTPATIENT)
Facility: HOSPITAL | Age: 22
End: 2022-11-21
Admitting: ADVANCED PRACTICE MIDWIFE
Payer: COMMERCIAL

## 2022-11-21 VITALS — RESPIRATION RATE: 16 BRPM | SYSTOLIC BLOOD PRESSURE: 123 MMHG | TEMPERATURE: 98.2 F | DIASTOLIC BLOOD PRESSURE: 69 MMHG

## 2022-11-21 DIAGNOSIS — N39.0 URINARY TRACT INFECTION WITHOUT HEMATURIA, SITE UNSPECIFIED: Primary | ICD-10-CM

## 2022-11-21 LAB
ALBUMIN UR-MCNC: NEGATIVE MG/DL
APPEARANCE UR: ABNORMAL
BACTERIA #/AREA URNS HPF: ABNORMAL /HPF
BILIRUB UR QL STRIP: NEGATIVE
CLUE CELLS: ABNORMAL
COLOR UR AUTO: ABNORMAL
FFN SPECIMEN INTEGRITY: NORMAL
FIBRONECTIN FETAL VAG QL: NEGATIVE
GLUCOSE UR STRIP-MCNC: NEGATIVE MG/DL
HGB UR QL STRIP: NEGATIVE
KETONES UR STRIP-MCNC: NEGATIVE MG/DL
LEUKOCYTE ESTERASE UR QL STRIP: ABNORMAL
MUCOUS THREADS #/AREA URNS LPF: PRESENT /LPF
NITRATE UR QL: NEGATIVE
PH UR STRIP: 6.5 [PH] (ref 5–7)
RBC URINE: 0 /HPF
SP GR UR STRIP: 1.01 (ref 1–1.03)
SQUAMOUS EPITHELIAL: 8 /HPF
TRICHOMONAS, WET PREP: ABNORMAL
UROBILINOGEN UR STRIP-MCNC: <2 MG/DL
WBC URINE: 17 /HPF
WBC'S/HIGH POWER FIELD, WET PREP: ABNORMAL
YEAST, WET PREP: ABNORMAL

## 2022-11-21 PROCEDURE — 82731 ASSAY OF FETAL FIBRONECTIN: CPT | Performed by: ADVANCED PRACTICE MIDWIFE

## 2022-11-21 PROCEDURE — 81001 URINALYSIS AUTO W/SCOPE: CPT | Performed by: ADVANCED PRACTICE MIDWIFE

## 2022-11-21 PROCEDURE — 87210 SMEAR WET MOUNT SALINE/INK: CPT | Performed by: ADVANCED PRACTICE MIDWIFE

## 2022-11-21 PROCEDURE — 87491 CHLMYD TRACH DNA AMP PROBE: CPT | Performed by: ADVANCED PRACTICE MIDWIFE

## 2022-11-21 PROCEDURE — G0463 HOSPITAL OUTPT CLINIC VISIT: HCPCS

## 2022-11-21 PROCEDURE — 87086 URINE CULTURE/COLONY COUNT: CPT | Performed by: ADVANCED PRACTICE MIDWIFE

## 2022-11-21 RX ORDER — NITROFURANTOIN 25; 75 MG/1; MG/1
100 CAPSULE ORAL 2 TIMES DAILY
Qty: 14 CAPSULE | Refills: 0 | Status: SHIPPED | OUTPATIENT
Start: 2022-11-21 | End: 2022-12-26

## 2022-11-21 ASSESSMENT — ACTIVITIES OF DAILY LIVING (ADL): ADLS_ACUITY_SCORE: 37

## 2022-11-21 NOTE — TELEPHONE ENCOUNTER
"Telephone call to on call CNM.     Patient calls this morning stating she is having some low back pain that is \"coming and going\" that started after arriving at her job at the [employer]. Patient states it feels as though she \"wrenched\" her back but the pain is not constant. States it has been occurring for the last hour and each episode of pain lasts about 2 minutes. Patient reports drinking adequate fluids this morning. Denies any UTI symptoms. Requested patient go where she can lie down for the next 30-60 minutes or to go home from work and to drink 2 large glasses of water now. If pain does not resolve with rest and hydration then will need to be evaluated for  labor. Patient states due to the busy time of year she is not sure she could leave work or rest for more than 15 minutes but will try and call back if symptoms worsen or do not improve with rest and hydration. All questions answered.   "

## 2022-11-21 NOTE — PROGRESS NOTES
Outpatient/Triage Note:    Patient Name:  Cassidy Fernandes  :      2000  MRN:      [id number]      Assessment:   @ 31w2d here for evaluation of low back pain  Not in  labor  FHR Category 1  Possible UTI      Plan:   -Will send Macrobid 100 mg PO BID to pharmacy for patient to start tonight  -UC pending  -GC/CT pending   - Discharge to home undelivered.   - Reviewed warning signs including decreased fetal movement, leaking of fluid, vaginal bleeding, or signs of labor.   - Reviewed how to contact on-call CNM.   - Follow-up in clinic with CNM as scheduled or sooner as needed.   - All questions answered. Agrees with plan.     Subjective:  Cassidy Fernandes is a 22 year old  at 31+2 weeks, presented to low back pain that started this morning after arriving at work. States low back pain has been coming and going and feels intense. Denies any UTI symptoms. Denies leaking of fluid, bleeding, or changes in fetal movement.     Objective:  Vital signs: /69   Temp 98.2  F (36.8  C) (Oral)   Resp 16   LMP 2022 (Exact Date)   FHR: Baseline 130, moderate variability, accelerations present, no decelerations   Uterine contractions: None, none palpated by RN  SVE: closed/-3 per RN      Temp:  [98.2  F (36.8  C)] 98.2  F (36.8  C)  Resp:  [16] 16  BP: (123)/(69) 123/69  No intake or output data in the 24 hours ending 22 1405    Results:  Results for orders placed or performed during the hospital encounter of 22   Fetal fibronectin     Status: None   Result Value Ref Range    Fetal Fibronectin Negative Negative    FFN Specimen Integrity Satisfactory Specimen    UA with Microscopic reflex to Culture     Status: Abnormal    Specimen: Urine, Midstream   Result Value Ref Range    Color Urine Light Yellow Colorless, Straw, Light Yellow, Yellow    Appearance Urine Turbid (A) Clear    Glucose Urine Negative Negative mg/dL    Bilirubin Urine Negative Negative    Ketones Urine Negative  Negative mg/dL    Specific Gravity Urine 1.007 1.001 - 1.030    Blood Urine Negative Negative    pH Urine 6.5 5.0 - 7.0    Protein Albumin Urine Negative Negative mg/dL    Urobilinogen Urine <2.0 <2.0 mg/dL    Nitrite Urine Negative Negative    Leukocyte Esterase Urine 500 Elva/uL (A) Negative    Bacteria Urine Many (A) None Seen /HPF    Mucus Urine Present (A) None Seen /LPF    RBC Urine 0 <=2 /HPF    WBC Urine 17 (H) <=5 /HPF    Squamous Epithelials Urine 8 (H) <=1 /HPF    Narrative    Urine Culture ordered based on laboratory criteria   Wet prep     Status: Abnormal    Specimen: Vagina; Swab   Result Value Ref Range    Trichomonas Absent Absent    Yeast Absent Absent    Clue Cells Absent Absent    WBCs/high power field 2+ (A) None       Provider: MARY Hunt, BERKLEY, HILDA

## 2022-11-21 NOTE — PROGRESS NOTES
Pt was sent from her clinic. Pt c/o been having abdominal cramps and backache since 0730 am. Pt denies having vaginal leakage or bleeding. Her abdomen palpated soft and toco monitor not showing contractions. Her NST was reactive. Her cultures and UA were sent. Pt was given oral fluid for hydration.  BERKLEY Ballard was updated about pt's not having contractions, reactive NST, labs results and cervical exam.  Orders given ok for pt going home.  CNM will send prescription for antibiotic.  Pt was discharged with instructions and not labor.

## 2022-11-22 LAB — C TRACH DNA SPEC QL NAA+PROBE: NEGATIVE

## 2022-11-23 LAB — BACTERIA UR CULT: NORMAL

## 2022-12-01 ENCOUNTER — PRENATAL OFFICE VISIT (OUTPATIENT)
Dept: MIDWIFE SERVICES | Facility: CLINIC | Age: 22
End: 2022-12-01
Payer: COMMERCIAL

## 2022-12-01 ENCOUNTER — HOSPITAL ENCOUNTER (OUTPATIENT)
Dept: ULTRASOUND IMAGING | Facility: HOSPITAL | Age: 22
Discharge: HOME | End: 2022-12-01
Attending: MIDWIFE | Admitting: MIDWIFE
Payer: COMMERCIAL

## 2022-12-01 VITALS — HEART RATE: 88 BPM | SYSTOLIC BLOOD PRESSURE: 108 MMHG | DIASTOLIC BLOOD PRESSURE: 62 MMHG

## 2022-12-01 DIAGNOSIS — U07.1 INFECTION DUE TO 2019 NOVEL CORONAVIRUS: ICD-10-CM

## 2022-12-01 DIAGNOSIS — F32.A DEPRESSION, UNSPECIFIED DEPRESSION TYPE: ICD-10-CM

## 2022-12-01 DIAGNOSIS — Z34.90 NORMAL INTRAUTERINE PREGNANCY, ANTEPARTUM: Primary | ICD-10-CM

## 2022-12-01 DIAGNOSIS — O98.519 COVID-19 AFFECTING PREGNANCY, ANTEPARTUM: ICD-10-CM

## 2022-12-01 DIAGNOSIS — A60.04 HERPES SIMPLEX VULVOVAGINITIS: ICD-10-CM

## 2022-12-01 DIAGNOSIS — U07.1 COVID-19 AFFECTING PREGNANCY, ANTEPARTUM: ICD-10-CM

## 2022-12-01 DIAGNOSIS — F43.23 ADJUSTMENT DISORDER WITH MIXED ANXIETY AND DEPRESSED MOOD: ICD-10-CM

## 2022-12-01 DIAGNOSIS — J45.990 EXERCISE-INDUCED ASTHMA: ICD-10-CM

## 2022-12-01 DIAGNOSIS — O09.90 HIGH-RISK PREGNANCY, UNSPECIFIED TRIMESTER: ICD-10-CM

## 2022-12-01 PROBLEM — Z36.89 ENCOUNTER FOR TRIAGE IN PREGNANT PATIENT: Status: RESOLVED | Noted: 2022-09-18 | Resolved: 2022-12-01

## 2022-12-01 PROBLEM — O26.859 SPOTTING IN EARLY PREGNANCY: Status: RESOLVED | Noted: 2022-05-23 | Resolved: 2022-12-01

## 2022-12-01 PROCEDURE — 90686 IIV4 VACC NO PRSV 0.5 ML IM: CPT | Performed by: ADVANCED PRACTICE MIDWIFE

## 2022-12-01 PROCEDURE — 90472 IMMUNIZATION ADMIN EACH ADD: CPT | Performed by: ADVANCED PRACTICE MIDWIFE

## 2022-12-01 PROCEDURE — 99207 PR PRENATAL VISIT: CPT | Performed by: ADVANCED PRACTICE MIDWIFE

## 2022-12-01 PROCEDURE — 90715 TDAP VACCINE 7 YRS/> IM: CPT | Performed by: ADVANCED PRACTICE MIDWIFE

## 2022-12-01 PROCEDURE — 90471 IMMUNIZATION ADMIN: CPT | Performed by: ADVANCED PRACTICE MIDWIFE

## 2022-12-01 PROCEDURE — 76805 OB US >/= 14 WKS SNGL FETUS: CPT

## 2022-12-01 NOTE — PROGRESS NOTES
Recommend genital HSV antiviral prophylaxis starting at 36 weeks.  Consider repeat fetal growth ultrasound at 36 weeks.  Cassidy presents to the clinic with her partner Javy after fetal growth ultrasound; EFW 2724 g / 6 pounds/> 90th percentile.  Preliminary report available, awaiting official report from radiologist.  Baby is active, and she denies headache, visual disturbances, right upper quadrant abdominal pain, regular uterine she endorses [place] contractions.  Questions regarding LGA.  This writer states suspected macrosomia is not an indication for IOL, but elective IOL is possible at 39+0 weeks if cervix is adequately ripened/with a qualifying Ramirez score.  Reviewed offering of a primary  birth if EFW >/=5000 g.  Total weight gain thus far is 20 pounds (upper limit of recommended) with pregravid BMI 34.  Written information given regarding hospital preregistration, breast pump, writing a birth plan, water birth education, car seats, breast-feeding and birth control, natural family planning and IUDs.  Patient is accepting of both Tdap and influenza vaccines today.  She DECLINES COVID-19 vaccines.   labor precautions and danger signs and symptoms reviewed.  All questions answered.  Encouraged daily fetal movement counting and to call or return to clinic with any questions, concerns, or as needed.

## 2022-12-12 ENCOUNTER — HOSPITAL ENCOUNTER (OUTPATIENT)
Facility: HOSPITAL | Age: 22
End: 2022-12-12
Admitting: ADVANCED PRACTICE MIDWIFE
Payer: COMMERCIAL

## 2022-12-12 ENCOUNTER — TELEPHONE (OUTPATIENT)
Dept: MIDWIFE SERVICES | Facility: CLINIC | Age: 22
End: 2022-12-12

## 2022-12-12 ENCOUNTER — HOSPITAL ENCOUNTER (OUTPATIENT)
Facility: HOSPITAL | Age: 22
Discharge: HOME | End: 2022-12-12
Attending: ADVANCED PRACTICE MIDWIFE | Admitting: ADVANCED PRACTICE MIDWIFE
Payer: COMMERCIAL

## 2022-12-12 VITALS
HEIGHT: 65 IN | DIASTOLIC BLOOD PRESSURE: 78 MMHG | BODY MASS INDEX: 38.32 KG/M2 | SYSTOLIC BLOOD PRESSURE: 128 MMHG | RESPIRATION RATE: 16 BRPM | WEIGHT: 230 LBS | TEMPERATURE: 97.8 F

## 2022-12-12 DIAGNOSIS — Z34.90 NORMAL INTRAUTERINE PREGNANCY, ANTEPARTUM: Primary | ICD-10-CM

## 2022-12-12 PROBLEM — O21.9 NAUSEA AND VOMITING IN PREGNANCY: Status: ACTIVE | Noted: 2022-12-12

## 2022-12-12 LAB
ALBUMIN UR-MCNC: 50 MG/DL
APPEARANCE UR: ABNORMAL
BACTERIA #/AREA URNS HPF: ABNORMAL /HPF
BILIRUB UR QL STRIP: NEGATIVE
CLUE CELLS: ABNORMAL
COLOR UR AUTO: YELLOW
GLUCOSE UR STRIP-MCNC: NEGATIVE MG/DL
HGB UR QL STRIP: NEGATIVE
KETONES UR STRIP-MCNC: 40 MG/DL
LEUKOCYTE ESTERASE UR QL STRIP: ABNORMAL
MUCOUS THREADS #/AREA URNS LPF: PRESENT /LPF
NITRATE UR QL: NEGATIVE
PH UR STRIP: 7 [PH] (ref 5–7)
RBC URINE: 4 /HPF
SP GR UR STRIP: 1.02 (ref 1–1.03)
SQUAMOUS EPITHELIAL: 6 /HPF
TRICHOMONAS, WET PREP: ABNORMAL
UROBILINOGEN UR STRIP-MCNC: 2 MG/DL
WBC URINE: 3 /HPF
WBC'S/HIGH POWER FIELD, WET PREP: ABNORMAL
YEAST, WET PREP: ABNORMAL

## 2022-12-12 PROCEDURE — 250N000011 HC RX IP 250 OP 636: Performed by: ADVANCED PRACTICE MIDWIFE

## 2022-12-12 PROCEDURE — 87653 STREP B DNA AMP PROBE: CPT | Performed by: ADVANCED PRACTICE MIDWIFE

## 2022-12-12 PROCEDURE — 258N000003 HC RX IP 258 OP 636: Performed by: ADVANCED PRACTICE MIDWIFE

## 2022-12-12 PROCEDURE — G0463 HOSPITAL OUTPT CLINIC VISIT: HCPCS

## 2022-12-12 PROCEDURE — 99214 OFFICE O/P EST MOD 30 MIN: CPT | Mod: 25 | Performed by: ADVANCED PRACTICE MIDWIFE

## 2022-12-12 PROCEDURE — 87210 SMEAR WET MOUNT SALINE/INK: CPT | Performed by: ADVANCED PRACTICE MIDWIFE

## 2022-12-12 PROCEDURE — 81001 URINALYSIS AUTO W/SCOPE: CPT | Performed by: ADVANCED PRACTICE MIDWIFE

## 2022-12-12 PROCEDURE — 59025 FETAL NON-STRESS TEST: CPT | Mod: 26 | Performed by: ADVANCED PRACTICE MIDWIFE

## 2022-12-12 RX ORDER — LIDOCAINE 40 MG/G
CREAM TOPICAL
Status: DISCONTINUED | OUTPATIENT
Start: 2022-12-12 | End: 2022-12-12 | Stop reason: HOSPADM

## 2022-12-12 RX ORDER — ONDANSETRON 2 MG/ML
4 INJECTION INTRAMUSCULAR; INTRAVENOUS EVERY 6 HOURS PRN
Status: DISCONTINUED | OUTPATIENT
Start: 2022-12-12 | End: 2022-12-12 | Stop reason: HOSPADM

## 2022-12-12 RX ADMIN — SODIUM CHLORIDE, POTASSIUM CHLORIDE, SODIUM LACTATE AND CALCIUM CHLORIDE 1000 ML: 600; 310; 30; 20 INJECTION, SOLUTION INTRAVENOUS at 13:03

## 2022-12-12 RX ADMIN — ONDANSETRON 4 MG: 2 INJECTION INTRAMUSCULAR; INTRAVENOUS at 13:28

## 2022-12-12 ASSESSMENT — ACTIVITIES OF DAILY LIVING (ADL): ADLS_ACUITY_SCORE: 33

## 2022-12-12 NOTE — PROGRESS NOTES
1430 pt denies feeling contractions and no more nausea. Pt had all of her iv fluid in. The monitor only traced occasional contractions and category one fetal tracing. Kourtney LIM CNM was updated on pt's lab results and stable condition. Orders ok for pt to go home.  Pt was discharged home not labor and stable condition.

## 2022-12-12 NOTE — LETTER
[address] [address]  Phone: [phone]  Fax: [phone]    December 12, 2022        Cassidy Fernandes  [address] [facility] [address] [place] [id number]          To whom it may concern:    RE: Cassidy Fernandes    Patient was seen and treated today at our clinic. Please excuse Cassidy from work on 12/12/22 and 12/13/22.     Please contact our clinic at [phone] with questions or concerns.      Sincerely,        MARY Hunt, CNM, WHNP

## 2022-12-12 NOTE — PROGRESS NOTES
Data: [facility] to [facility]: 2022 12:01 PM.  Reason for maternal/fetal assessment is uterine contractions and nausea and vomiting. Patient reports N/V and diarrhea  started at 0730.  Patient is a .  Prenatal record reviewed. Pregnancy  has been complicated by has been uncomplicated.  Gestational Age 34w2d. VSS. Fetal movement present. Patient denies leaking of vaginal fluid/rupture of membranes, vaginal bleeding, headache, visual disturbances, epigastric or URQ pain, significant edema. Support person is present   (significant other Javy).   Action: Verbal consent for EFM. Triage assessment completed. Bill of rights reviewed.  Response: Patient verbalized agreement with plan. Will contact Dr Kourtney Tamayo with update and further orders.

## 2022-12-12 NOTE — PROGRESS NOTES
"Outpatient/Triage Note:    Patient Name:  Cassidy Fernandes  :      2000  MRN:      [id number]      Assessment:   @ 34w2d here for evaluation of abdominal tightening, nausea, vomiting and diarrhea  FHR Category 1  Not in labor  Dehydration      Plan:   -1L IV fluid bolus  -GBS today  -Wet prep and UA today  -Zofran for nausea and vomiting  -Note provided to be off work today and tomorrow  - Discharge to home undelivered, encourage rest and hydration    - Reviewed warning signs including decreased fetal movement, leaking of fluid, vaginal bleeding, or signs of labor.   - Reviewed how to contact on-call CNM.   - Follow-up in clinic with CNM as scheduled or sooner as needed.   - All questions answered. Agrees with plan.     Subjective:  Cassidy Fernandes is a 22 year old  at 34+2 weeks, presented to [facility] for evaluation of abdominal tightening, nausea, vomiting and diarrhea. Patient reports that she awoke this morning with vomiting and diarrhea around 0700. States she has been drinking water but has not been able to keep any fluids down. Reports feeling abdominal tightening every 10 minutes that is lasting 60-90 seconds. Reports having intercourse last evening.  Denies leaking of fluid, bleeding, or changes in fetal movement.     Patient has clinic appointment today at 1800, will cancel clinic appointment and let CNM in clinic know.  Fundal height at hospital 35 cm.Will order follow up growth ultrasound for patient to schedule at 36 weeks gestation due to LGA.     Objective:  Vital signs: Resp 18   Ht 1.651 m (5' 5\")   Wt 104.3 kg (230 lb)   LMP 2022 (Exact Date)   BMI 38.27 kg/m    FHR: Baseline 125, moderate variability, accelerations present, no decelerations   Uterine contractions: Every 3-4 minutes on admission                                     Occasional contractions noted after IV fluid hydration     SVE: closed/10%/-3 per RN exam     Resp:  [18] 18  No intake or output " data in the 24 hours ending 12/12/22 1246    Results:  Results for orders placed or performed during the hospital encounter of 12/12/22   UA reflex to Microscopic and Culture     Status: Abnormal    Specimen: Urine, Clean Catch   Result Value Ref Range    Color Urine Yellow Colorless, Straw, Light Yellow, Yellow    Appearance Urine Turbid (A) Clear    Glucose Urine Negative Negative mg/dL    Bilirubin Urine Negative Negative    Ketones Urine 40 (A) Negative mg/dL    Specific Gravity Urine 1.024 1.001 - 1.030    Blood Urine Negative Negative    pH Urine 7.0 5.0 - 7.0    Protein Albumin Urine 50 (A) Negative mg/dL    Urobilinogen Urine 2.0 (A) <2.0 mg/dL    Nitrite Urine Negative Negative    Leukocyte Esterase Urine 25 Elva/uL (A) Negative    Bacteria Urine Moderate (A) None Seen /HPF    Mucus Urine Present (A) None Seen /LPF    RBC Urine 4 (H) <=2 /HPF    WBC Urine 3 <=5 /HPF    Squamous Epithelials Urine 6 (H) <=1 /HPF    Narrative    Urine Culture not indicated   Wet preparation     Status: Abnormal    Specimen: Vagina; Swab   Result Value Ref Range    Trichomonas Absent Absent    Yeast Absent Absent    Clue Cells Absent Absent    WBCs/high power field 1+ (A) None       Provider: MARY Hunt, BERKLEY, HILDA     TT with patient 30 min >50% time spent counseling.

## 2022-12-12 NOTE — PROGRESS NOTES
RN spoke with Kourtney Tamayo in department. Received orders for wet prep, GBS swab, and UA. ROM plu snot ordered due to reset sexual intercourse. IVFB of 1 L ordered. SVE by RN 0/10/-3.    Report given to Makayla CHUA RN

## 2022-12-13 LAB — GP B STREP DNA SPEC QL NAA+PROBE: POSITIVE

## 2022-12-13 NOTE — TELEPHONE ENCOUNTER
TC: Cassidy is checking in, reporting another bout of emesis shortly after returning home from her [facility] assessment today and diarrhea again at 1830. She notes temperature of 100.3 and 100.6F and shares she took 375mg tylenol at 1700 with little effect. She continues to have contractions that are similar to her contractions during her [facility] assessment. We discussed therapeutic level of tylenol and dosing recommendations, advised to take 2 tabs tylenol now and may schedule her dosing as needed. Advised on ongoing hydration following her IV fluid infusion earlier today. Reviewed typical course of gastroenteritis and expectations for remitting diarrhea by 48 hours. Reviewed BRAT diet as she experiences a return of her appetite. Questions answered regarding her concern for the above sx and preeclampsia; advised is not a typical presentation of preeclampsia and is typical of gastroenteritis. All questions answered and agrees to continue home monitoring and comfort measures discussed.

## 2022-12-16 ENCOUNTER — MYC MEDICAL ADVICE (OUTPATIENT)
Dept: PSYCHIATRY | Facility: CLINIC | Age: 22
End: 2022-12-16

## 2022-12-17 NOTE — TELEPHONE ENCOUNTER
Telephone call to patient to discuss GBS positive result. Discussed need for antibiotics in labor or with SROM, patient accepting of this information. All questions answered.

## 2022-12-26 ENCOUNTER — TELEPHONE (OUTPATIENT)
Dept: NURSING | Facility: CLINIC | Age: 22
End: 2022-12-26

## 2022-12-26 ENCOUNTER — HOSPITAL ENCOUNTER (OUTPATIENT)
Facility: CLINIC | Age: 22
Discharge: HOME | End: 2022-12-27
Attending: ADVANCED PRACTICE MIDWIFE | Admitting: ADVANCED PRACTICE MIDWIFE

## 2022-12-26 ENCOUNTER — PRENATAL OFFICE VISIT (OUTPATIENT)
Dept: MIDWIFE SERVICES | Facility: CLINIC | Age: 22
End: 2022-12-26
Payer: COMMERCIAL

## 2022-12-26 ENCOUNTER — TELEPHONE (OUTPATIENT)
Dept: OBGYN | Facility: CLINIC | Age: 22
End: 2022-12-26

## 2022-12-26 ENCOUNTER — HOSPITAL ENCOUNTER (OUTPATIENT)
Dept: ULTRASOUND IMAGING | Facility: HOSPITAL | Age: 22
Discharge: HOME | End: 2022-12-26
Attending: ADVANCED PRACTICE MIDWIFE | Admitting: ADVANCED PRACTICE MIDWIFE
Payer: COMMERCIAL

## 2022-12-26 VITALS
HEIGHT: 65 IN | SYSTOLIC BLOOD PRESSURE: 116 MMHG | RESPIRATION RATE: 16 BRPM | WEIGHT: 228 LBS | DIASTOLIC BLOOD PRESSURE: 67 MMHG | BODY MASS INDEX: 37.99 KG/M2

## 2022-12-26 VITALS
HEART RATE: 72 BPM | WEIGHT: 228 LBS | DIASTOLIC BLOOD PRESSURE: 74 MMHG | BODY MASS INDEX: 37.94 KG/M2 | SYSTOLIC BLOOD PRESSURE: 120 MMHG

## 2022-12-26 DIAGNOSIS — A60.04 HERPES SIMPLEX VULVOVAGINITIS: ICD-10-CM

## 2022-12-26 DIAGNOSIS — Z34.90 NORMAL INTRAUTERINE PREGNANCY, ANTEPARTUM: Primary | ICD-10-CM

## 2022-12-26 DIAGNOSIS — Z34.90 NORMAL INTRAUTERINE PREGNANCY, ANTEPARTUM: ICD-10-CM

## 2022-12-26 DIAGNOSIS — O26.849 UTERINE SIZE DATE DISCREPANCY, ANTEPARTUM: ICD-10-CM

## 2022-12-26 LAB
HGB BLD-MCNC: 12.1 G/DL (ref 11.7–15.7)
HOLD SPECIMEN: NORMAL

## 2022-12-26 PROCEDURE — 36415 COLL VENOUS BLD VENIPUNCTURE: CPT | Performed by: ADVANCED PRACTICE MIDWIFE

## 2022-12-26 PROCEDURE — 76816 OB US FOLLOW-UP PER FETUS: CPT

## 2022-12-26 PROCEDURE — 99207 PR PRENATAL VISIT: CPT | Performed by: ADVANCED PRACTICE MIDWIFE

## 2022-12-26 PROCEDURE — 85460 HEMOGLOBIN FETAL: CPT | Performed by: ADVANCED PRACTICE MIDWIFE

## 2022-12-26 PROCEDURE — 85018 HEMOGLOBIN: CPT | Performed by: ADVANCED PRACTICE MIDWIFE

## 2022-12-26 PROCEDURE — G0463 HOSPITAL OUTPT CLINIC VISIT: HCPCS

## 2022-12-26 RX ORDER — VALACYCLOVIR HYDROCHLORIDE 500 MG/1
500 TABLET, FILM COATED ORAL 2 TIMES DAILY
Qty: 30 TABLET | Refills: 1 | Status: ON HOLD | OUTPATIENT
Start: 2022-12-26 | End: 2023-01-12

## 2022-12-26 RX ORDER — LIDOCAINE 40 MG/G
CREAM TOPICAL
Status: DISCONTINUED | OUTPATIENT
Start: 2022-12-26 | End: 2022-12-27 | Stop reason: HOSPADM

## 2022-12-26 ASSESSMENT — ACTIVITIES OF DAILY LIVING (ADL): ADLS_ACUITY_SCORE: 33

## 2022-12-26 NOTE — PATIENT INSTRUCTIONS
"[facility] Nurse Midwives - Contact information:  Appointment line and to get a hold of CNM in clinic Monday-Friday 8 am - 5 pm:  [phone].  There are some clinics with early start times (1st appointment 7:40 am) and others with evening hours (last appointment 6:20 pm).  Most are typically open from 8 am to 5 pm.    CNM on call answering service: [phone].  Specify your hospital of choice and leave a brief message for CNM;  will then page CNM who is on call at your specified hospital and you should receive a call back with 15 minutes.  Be sure that your ringer is audible and that you can accept blocked calls so that we can get back in touch with you! This number should be reserved for urgent needs if during the day, before 8 am, after 5 pm, weekends, holidays.    Contact the on-call CNM with warning signs, such as:  vaginal bleeding   Vaginal discharge and itching or pain and burning during urination  Leg/calf pain or swelling on one side  severe abdominal pain  nausea and vomiting more than 4-5 times a day, or if you are unable to keep anything down  fever more than 100.4 degrees F.     [facility]  After each of your visits you are welcome to check [employer] for your visit summary including education and links to information relevant to your pregnancy and/or well woman care.   Find the \"Visits\" tab at the top of the page, you will see a list of recent visits and for each visit a for link for \"View After Visit Summary.\" View of your After Visit Summary will allow you to read our recommendations from your visit, review any education provided, and link to websites with useful information.   If you have any questions or difficulty navigating [employer], please feel free to contact us and we will do our best to direct you.    Meet the Midwives from [facility]  You are invited to an informational meet and greet with [facility] Certified Nurse-Midwives. Our free \"Meet the " "Midwives\" event is a great opportunity to learn about our midwives' philosophy and experience, the hospitals where we can assist with your birth, and answer questions you may have. Partners, friends, and family are welcome to attend. Currently, this is a virtual event.  Dates: Last Thursday of every month at 7 pm.    Link to next (live) meeting  [email or web address]://[email or web address]/classes-and-[email or web address]/meet-the-midwives-from-[facility] [place]  To Join by Telephone (audio only) Call:   [phone] Phone Conference ID: [phone]#      Touring the Maternity Care Center  At this time we are offering a virtual tour of the Maternity Care Centers at both [facility] [place]:   [facility] Maternity Care:   https://Quanta Fl[employer] SolutionsAvita Health SystemOndango.P[employer] Medical/locations/the-birthplace-[facility] M[facility] Care:   https://Shopline/locations/the-[facility]      Childbirth and Parenting Education:     Everyday Miracles:   https://www.everyday-miracles.org/    Free Video Series from [facility]: https://nursing.[facility]/academics/specialty-areas/nurse-midwifery/having-baby-prenatal-videos/having-baby-prenatal-and    PALOMO parenting center: http://[facility] HospitalITM Power.adQ/   [phone] 327-CQES  Blooma: (education, yoga & wellness) www.Donordonut.adQ  Enlightened Mama: www.enlightenedmama.com   Childbirth collective: (Parent topic nights)  www.childbirthcollective.org/  Hypnobabies:  www.hypnobabiestUXFLIPcities.com/  Hypnobirthing:  Http://hypnobirthing.com/  The Birth Hour: https://thebirthhour.adQ/online-childbirth-class/    APPS and Podcasts:   Ping López Nurture    Evidence Based Birth  The Birth Hour (for birth stories)   Birthful   Expectful   The Longest Shortest Time  PregnancyPodcast Briseida Leroy    Book Recommendations:   Angela Ramseur's Birthing From Within--first few chapters include a new-age tone, you may prefer to skip it and " "keep going, because there is good stuff later.  This book recommendation covers emotional preparation, but does cover coping with pain, and use of both pharmacological and nonpharmacological methods.    Dr. Barger' The Pregnancy Book and The Birth Book--the pregnancy book goes month-by month      The Birth Partner by Christie Liu    Womanly Art of Breastfeeding by La Leche League International   Breastfeeding by Elma Grossman--great pictures    Mothering Your Nursing Toddler, by Nazia Cramer.   Addresses dealing with so many of the challenging behaviors of a nursing toddler.  How Weaning Happens, by La Leche League.  Discusses weaning at all ages, from medically necessary weaning of an infant, all the way up to age 5 (or older), with why/why not, and strategies.  Very empowering book both for deciding to wean and deciding not to.    American College of Nurse-Midwives (ACNM) http://www.midwife.org/; look at the informational handouts at http://www.midwife.org/Share-With-Women     www.mymidwife.org    Mother to Baby (Medication and Herbal guidance in pregnancy): http://www.mothertobaby.org  Toll-Free Hotline: 246.422.4302  LactMed (Medication use while breastfeeding): http://toxnet.nlm.nih.gov/newtoxnet/lactmed.htm    Women's Health.gov:  http://www.womenshealth.gov/a-z-topics/index.html    American pregnancy association - http://americanpregnancy.org    Centering Pregnancy (group prenatal care option): http://centeringhealthcare.org    Information about doulas:  Childbirth collective: http://www.childbirthcollective.org/  Doulas of [place] (LATHA):  www.latha.org  [facility]  project: http://twincitiesdoulaproject.com/     Early Childhood and Family Education (ECFE):  ECFE offers parents hands-on learning experiences that will nourish a lifetime of teachable moments.  http://ecfe.info/ecfe-home/    March of Dimes www.Per[employer] - Nutrition  www.mypyramid.gov  Under \"For Consumers,\" " "click on \"pregnant and breastfeeding women.\"      Centers for Disease Control and Prevention (CDC) - Vaccines : http://www.cdc.gov/vaccines/       When researching information on the web, question the validity of websites.  The Bizzingo .gov, .edu and.org tend to be more reliable information.  If there are a lot of advertisements, be cautious of the information provided. Stay away from blogs and chat rooms please!     Making Plans for Feeding My Baby    By this point, you probably have read a lot about feeding your baby.  Breastfeed or formula? Each parent's decision is their own and [facility] Nurse Midwives [facility] respects you and your choices. We ve gathered information on both breastfeeding and formula feeding to help with your decision. Talking with your nurse-midwife can also help in your decision.  However you plan to feed your baby, [facility] Maternity Care [facility] encourage rooming in with your baby, skin-to-skin contact and feeding your baby based on his or her cues.    Skin-to-skin contact  Being close to mom helps your baby adjust to life outside of the womb.  It helps your baby regulate their body temperature, heart rate, and breathing.  Your baby will usually be placed skin-to-skin immediately following birth or as soon as possible, if medical intervention is needed.    Rooming-In  Having your baby stay with you in your room is called  rooming-in .  Keeping your baby in your room helps you to learn how to care for your baby by getting to know your baby s cues, body rhythms and sleep cycle.       Cue-based feeding  Cues (signals) are baby s way of telling you what he or she wants.  When you learn your infant s cues, you know how to care for and feed your baby.   Feeding cues are the licking and smacking of lips, bringing their fist to their mouth, and a reflex called  rooting - where baby turns and opens his or her mouth, searching for the breast or bottle.  Crying is a late feeding " cue.  Babies can feed frequently, often at least 8 times in 24 hours.    Breastfeeding facts  Breast milk is the best source of nutrition for your baby and is available at birth. In the first couple of days, your milk volume is already starting to increase, though it may not be noticeable. Breastfeed frequently to increase your milk supply. Within three to five days, you will begin to notice larger milk volumes. An increase in breast size, heaviness and firmness are often described as the milk  coming in.  Frequent breastfeeding can help breasts from getting overly firm and painful. You will know the baby is getting enough milk if your baby is having wet and dirty diapers and gaining weight.     If your goal is to exclusively breastfeed, it is important to not use any formula or artificial nipples (including bottles and pacifiers) while your baby is learning to breastfeed.  While it may seem like an  easy  option to give your baby a bottle, formula should only be given if there is a medical reason for your baby to have it.      Positioning and attachment   Get comfortable.  Use pillows as needed to support your arms and baby.  Hold baby close at the level of your breast, facing you in a tummy to tummy position.  Skin to skin helps with this.  Position the baby with his or her nose by the nipple.  There should be a straight line from baby s ear to shoulder to hips.  Tickle your baby s lips or wait for baby to open mouth wide, bring baby to breast by leading with the chin.  Aim the nipple at the roof of baby s mouth.  A rapid sucking pattern is followed by longer, drawing pattern with occasional swallows heard.  When baby is correctly latched, your nipple and much of the areola are pulled well into baby s mouth.      Returning to work or school  Focus on a good start to breastfeeding.  Many women continue to provide breastmilk for their baby when they return to work or school.  Making plans about where to pump and  store milk can make the transition go well.  Talk with other mothers who have also returned to work or school for tips and support.  Your employer s Human Resource department may be a resource as well.     Returning to work or school: (continued)   babies can mean fewer  sick  days for you.  A quality breast pump will also save time and add comfort.  Check with your insurance prior to giving birth for breast pump coverage.  Many insurance companies include a pump within your benefits.  Wait until your baby is at least three weeks old to introduce a bottle for the first time.  Have someone besides you give the bottle.  Breastfeed when you are with your baby. Reserve your bottles of breast milk for when you are away.   Your breasts will need to be  emptied  either by your baby or a pump.  Plan to pump at least twice in an eight hour day.  If you cannot pump at work, continue breastfeeding at home. Any amount of breast milk is worth giving to your baby.    Formula feeding facts  If you are planning to use formula to feed your baby, you will want to make some preparations ahead of time. Talk to your doctor or nurse-midwife about what type of formula to use. Some are iron-fortified, meaning they have extra iron in them. You will want to purchase formula and bottles before your baby is born to be sure you are ready after you return from the hospital. The [facility] do not provide formula samples to take home.    Be sure to follow formula mixing directions closely. Regular milk in the dairy case at the grocery store should not be given to babies under 1 year old. Baby formula is sold in several forms including:  Ready-to-use. This is the most expensive, but no mixing is necessary.  Concentrated liquid. This is less expensive than ready-to-use and you mix with water.  Powder. This is the least expensive. You mix one level scoop of powdered formula with two ounces of water and stir well.    Most babies  need 2.5 ounces of formula per pound of body weight each day. This means an 8-pound baby may drink about 20 ounces of formula a day; however, this is just an estimate. The most important thing is to pay attention to your baby s cues.  If your baby is always fussy, needs more iron or has certain food allergies, your physician may suggest you change your baby s formula to a different kind.     How do I warm my baby s bottles?  You may feed your baby a bottle without warming it first. It is OK for the breast milk or formula to be cool or room temperature. If your baby seems to prefer it warmed, you can put the filled bottle in a container of warm water and let it stand for a few minutes. Check the temperature of the liquid on your skin before feeding it to your baby; to be sure it isn t too hot. Do not heat bottles in the microwave. Microwaves heat food and liquids unevenly, and this can cause hot spots that can burn your baby.    How do I clean and sterilize bottles?  Sterilize bottles and nipples before you use them for the first time. You can do this by putting them in boiling water for 5 minutes. After that first time, you can wash them in hot and soapy water. Rinse them carefully to be sure there is no soap left on them. You can also wash them in the .    Breastfeeding/Chestfeeding Support  Contact us  Breastfeeding/chestfeeding is the natural and healthy way for parents to feed their babies and provides the best source of nutrition in most cases to infants. Breast milk has health benefits for mom, too. The American Academy of Pediatrics recommends exclusively breastfeeding for 6 months for optimal growth and development and breastfeeding up to at least a year.  Benefits to baby:  Easy digestion, less diarrhea and constipation, breast milk is easy to digest.  Lots of bonding with parent.  Less likely to have asthma.  Less ear infections and respiratory infections.  Less likely to have Type 2  Diabetes.  Less likely to become obese.  Lower risk of Sudden Infant Death Syndrome (SIDS).  Benefits to breastfeeding/chestfeeding parent:  Helps with weight loss.  Lower risk of ovarian and breast cancer, Type 2 diabetes and heart disease.  /chestfed babies are easy to take on trips. Just grab the diapers and go!  Breastfeeding/chestfeeding saves money (no formula or bottle costs, fewer doctor bills and medication costs).  Ready to breastfeed/chestfeed anywhere, don t need to make and wash bottles.  Breastfeeding/chestfeeding is wonderful, but not always easy. Learning what to expect ahead of time and get support from a lactation professional. Check out the [facility] Breastfeeding Resource Guide for classes, drop in support groups, childbirth education, Doulas and clinic and hospital lactation services.   B    reastfeeding clinic visits with [facility] Nurse Midwives [facility] IBCLC lactation consultants are at [facility] on , [facility] on  and [facility] on . Call to schedule, [phone].    New Parent Connection:   [facility], [address] [place] [address], [place]  In-person meetings on  from 6 pm - 7:15 pm for parents of  to 9 months, at the same site.   All are free, drop-in, no registration required.    There are also free virtual meetings ongoing on :  11:30 am - 12:30 pm for parents of newborns to 3 months  4:15 pm to 5:15 pm for parents of 3 to 9-month olds  For joining info parents should call Abbey Bell at [phone]    [facility] Baby Café  Due to COVID-19, all Baby Café sessions are canceled until further notice. For lactation support, please contact one of our bilingual staff:  Audra (IBCLC) [phone]  Radha (IBCLC/ Icelandic) [phone]  Marzena (Hmong) [phone]  Gomez (Syrian) [phone]  Baby Café is a free, drop-in service offering breastfeeding/chestfeeding support. Come share tips and socialize with  "other pregnant, breastfeeding/chestfeeding families. Babies and siblings are welcome (no  available).  We offer:  Professionally trained lactation staff.  Resource books for lending.  Relaxed and fun atmosphere.  Refreshments.  Locations  Baby Café is offered at several locations.  Please see below for the Baby Café closest to you.  CANCELED UNTIL FURTHER NOTICE  [facility]  [address] [address]  1st Wednesdays of the month   10 a.m. - Noon  CANCELED UNTIL FURTHER NOTICE  [facility]  [address], [place], [facility]  4th Wednesdays of the month   10 a.m. - 12:30 p.m.     CANCELED UNTIL FURTHER NOTICE  [facility]  [address], [place], [address]  4th Wednesdays of the month  4 - 6 p.m.  CANCELED UNTIL FURTHER NOTICE  [employer] [address], [place], [address]  2nd Thursdays of the month.  9:30-11:30 a.m.  Enter through the east end of the building, the blue Door C.  Ring the ECFE buzzer to be let in.   More information  Radha Cantor  [phone]  [email or web address].         Virtual Breastfeeding Support:    During this time of isolation, breastfeeding families need even more community!  Here are some area organizations offering virtual support groups for breastfeeding:    [facility] Cafe Support Group, Tuesdays at 10:30 am   Run by REGINA Gloria of [employer] [facility]   Go to [employer] [facility] Facebook page and click on \"events\" for link   https://www.facebook.com/events/407437828439909/  [facility] Milk Hour, Thursdays at 2:30 pm    Run by REGINA Payan   Go to [facility] FB page and send message to get link   https://www.[employer].com/healthfoundations/  Ya Kimball [facility] holding virtual meetings the first Tuesday of each month, 8-9 pm, and the   Third Saturday, 10 " - 11 am.  Go to La Leche League [facility] [place] FB page; message to get link [email or web address]/?hc_location=[facility]  Mariaa offers a Lactation Lounge every Friday 12pm - 1pm, run by Ya Spencer Leader   Sign up via link at Montnets/cbe-lactation   https://www.Montnets/cbe-lactation  [facility] is offering virtual support groups every Monday, 10:30 am - 12 pm, run by nurse IBCLC   Https://[email or web address]/    Prenatal Breastfeeding Classes:      [employer] is offering virtual breastfeeding and  care classes:  https://www.Montnets/education-workshops  BirthEd childbirth and breastfeeding education offering virtual prenatal breastfeeding classes  Https://www.Mailpile/workshops      Preparing for your baby:    Screening Program  Http://www.health.[facility].mn./newbornscreening/  [place] newborns are tested soon after birth for more than 50 hidden, rare disorders, including hearing loss and critical congenital heart disease (CCHD). This site provides resources and information for families and providers.    When to call:   Appointment line and to get a hold of CNM in clinic Monday-Friday 8 am - 5 pm:  [phone].  There are some clinics with early start times (1st appointment 7:40 am) and others with evening hours (last appointment 6:20 pm).  Most are typically open from 8 am to 5 pm.    CNM on call answering service: [phone].  Specify your hospital of choice and leave a brief message for CNM;  will then page CNM who is on call at your specified hospital and you should receive a call back with 15 minutes.  Be sure that your ringer is audible and that you can accept blocked calls so that we can get back in touch with you! This number should be reserved for urgent needs if during the day, before 8 am, after 5 pm, weekends, holidays.    Contact the on-call CNM with warning signs, such  as:  vaginal bleeding   Vaginal discharge and itching or pain and burning during urination  Leg/calf pain or swelling on one side  severe abdominal pain  nausea and vomiting more than 4-5 times a day, or if you are unable to keep anything down  fever more than 100.4 degrees F.     Make plans for transportation and  as needed for when you are going to the hospital.    Ask your health care provider about vaccinations you may need following delivery. By now, you should have received a Tdap immunization to protect against pertussis or whooping cough. Fathers and family members who will be in close contact with the baby should also receive a Tdap shot at least two weeks before the expected birth of the baby if they have not had a Td (tetanus) shot for at least two years.    Your midwife may offer to check your cervix for changes. If you are past your due date, discuss the next steps leading to delivery with your midwife. If you don't start labor on your own by 41 or 42 weeks, your midwife may recommend giving you medicines to ripen your cervix and start labor.  Induction of labor: http://onlinelibrary.adam.com/store/10.1016/j.jmwh.2008.04.018/asset/j.jmwh.2008.04.018.pdf?v=1&t=flzx5pef&o=[email or web address]    Tell your midwife or physician how you plan to feed your baby (breast or bottle), who you have chosen to do pediatric care for your baby, and if you have a boy, whether you have chosen to have him circumcised. You will need a car seat correctly installed in your vehicle to bring your baby home. As you start to set up the nursery at home for your baby, make sure the crib is safe. The mattress needs to fit snugly against the edges of the crib. If you can fit a soda can between the bars, they are too far apart and can allow the baby's head to caught between them.    Learn about infant care and feeding, including information about infant CPR. We recommend that you put your baby to sleep  on his or her back to reduce the chance of Sudden Infant Death Syndrome (SIDS). To maintain a healthy environment in which your child can grow, it's best to keep your home smoke-free. By preparing ahead, your transition into parenthood will go smoothly for you and your baby.    Your midwife will want to see you for a checkup 2 to 6 weeks after delivery.

## 2022-12-26 NOTE — PROGRESS NOTES
Here with Javy for a routine prenatal visit at 36w 2d. Reports normal fetal movements. Denies regular painful contractions, bleeding, leaking, changes in fetal movement.  Recently had growth ultrasound to follow-up on size greater than dates.  Report not yet available.  Was told baby was cephalic.  Has questions again about management for suspected macrosomia and again this writer reviewed that suspected macrosomia is not an indication for induction of labor.  If her cervix is favorable at 39 weeks 0 days, she can be scheduled for an elective induction if desired.  If EFW is greater than or equal to 5000 g at term, she can be offered an elective  section.  Patient acknowledges understanding.  Birth plan written but only accessible on patient's phone.  Encouraged to print off and bring to next visit for review and scanning into chart.  Questions answered about how Javy can support her in labor.  Has not yet chosen a pediatrician/family practice provider for .  GBS done previously (22) and was positive.  Will plan to repeat GBS if still pregnant 5 weeks from initial test, on or after 23.  Accepts hemoglobin today. Reviewed term labor precautions, warning signs and when/how to call the on-call CNM. Encouraged weekly visits until birth. Accepts genital HSV antiviral prophylaxis prescription to pharmacy.  Encouraged to take twice daily and notify on-call CNM of any genital herpes outbreaks.

## 2022-12-27 ENCOUNTER — DOCUMENTATION ONLY (OUTPATIENT)
Dept: OBGYN | Facility: CLINIC | Age: 22
End: 2022-12-27

## 2022-12-27 PROBLEM — O47.03 PRETERM UTERINE CONTRACTIONS IN THIRD TRIMESTER, ANTEPARTUM: Status: ACTIVE | Noted: 2022-12-27

## 2022-12-27 PROBLEM — M79.10 MUSCLE SORENESS: Status: ACTIVE | Noted: 2022-12-27
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
## 2022-12-27 LAB
FETAL RBC % LFV: 0 %
FETAL RBC (ML): 0 ML
IF INDICATED RECOMMENDED DOSE OF RH IMMUNE GLOBULIN UG: 300 UG

## 2022-12-27 PROCEDURE — G0463 HOSPITAL OUTPT CLINIC VISIT: HCPCS

## 2022-12-27 PROCEDURE — 99214 OFFICE O/P EST MOD 30 MIN: CPT | Mod: 25 | Performed by: ADVANCED PRACTICE MIDWIFE

## 2022-12-27 PROCEDURE — 59025 FETAL NON-STRESS TEST: CPT | Performed by: ADVANCED PRACTICE MIDWIFE

## 2022-12-27 ASSESSMENT — ACTIVITIES OF DAILY LIVING (ADL): ADLS_ACUITY_SCORE: 33
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
## 2022-12-27 NOTE — TELEPHONE ENCOUNTER
OB Triage Call      Is patient's OB/Midwife with the formerly [facility] or [facility]? [facility]- Is patient's primary OB a Midwife? Yes- At this time we do not triage for the E midwives.  Paged on-call Midwife Kylah Hernandes at 8:26pm to contact patient directly.     Is patient's delivering hospital on divert? No      36w2d    Estimated Date of Delivery: 2023        OB History    Para Term  AB Living   1 0 0 0 0 0   SAB IAB Ectopic Multiple Live Births   0 0 0 0 0      # Outcome Date GA Lbr Carlos Eduardo/2nd Weight Sex Delivery Anes PTL Lv   1 Current                No results found for: GBS       Natacha Mcneill RN 22 8:23 PM  [facility] Nurse Advisor
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
## 2023-01-02 ENCOUNTER — PRENATAL OFFICE VISIT (OUTPATIENT)
Dept: MIDWIFE SERVICES | Facility: CLINIC | Age: 23
End: 2023-01-02
Payer: COMMERCIAL

## 2023-01-02 VITALS
BODY MASS INDEX: 38.61 KG/M2 | OXYGEN SATURATION: 98 % | SYSTOLIC BLOOD PRESSURE: 126 MMHG | DIASTOLIC BLOOD PRESSURE: 68 MMHG | WEIGHT: 232 LBS | HEART RATE: 99 BPM

## 2023-01-02 DIAGNOSIS — Z34.90 NORMAL INTRAUTERINE PREGNANCY, ANTEPARTUM: Primary | ICD-10-CM

## 2023-01-02 DIAGNOSIS — O99.820 GBS (GROUP B STREPTOCOCCUS CARRIER), +RV CULTURE, CURRENTLY PREGNANT: ICD-10-CM

## 2023-01-02 DIAGNOSIS — R23.8 PAPULE: ICD-10-CM

## 2023-01-02 DIAGNOSIS — A60.04 HERPES SIMPLEX VULVOVAGINITIS: ICD-10-CM

## 2023-01-02 DIAGNOSIS — O26.849 UTERINE SIZE DATE DISCREPANCY, ANTEPARTUM: ICD-10-CM

## 2023-01-02 PROBLEM — O47.03 PRETERM UTERINE CONTRACTIONS IN THIRD TRIMESTER, ANTEPARTUM: Status: RESOLVED | Noted: 2022-12-27 | Resolved: 2023-01-02

## 2023-01-02 PROCEDURE — 87529 HSV DNA AMP PROBE: CPT | Performed by: ADVANCED PRACTICE MIDWIFE

## 2023-01-02 PROCEDURE — 99207 PR PRENATAL VISIT: CPT | Performed by: ADVANCED PRACTICE MIDWIFE

## 2023-01-02 PROCEDURE — 59426 ANTEPARTUM CARE ONLY: CPT | Performed by: ADVANCED PRACTICE MIDWIFE

## 2023-01-03 LAB
HSV1 DNA SPEC QL NAA+PROBE: NOT DETECTED
HSV2 DNA SPEC QL NAA+PROBE: NOT DETECTED

## 2023-01-03 NOTE — PROGRESS NOTES
Cassidy presents to the clinic with FOB Javy.  Last genital HSV outbreak was about a year ago.  3 to 4 days ago, she noticed left labia majora with an area that burned and a bump.  On PE, left labia majora near perineum with a papule/fernández looking skin eruption cultured with HSV PCR.  This writer has a low suspicion for genital HSV outbreak.  Patient began taking antiviral HSV suppression therapy 1 week ago, and encouraged to continue.  We will plan to increase the dosage if this indeed is a genital HSV outbreak.  Partner Javy stated that, when she had a genital HSV outbreak PREVIOUSLY, that the lesions looked differently: A red [place] around a crater like lesion.  Baby has been active, and she denies loss of fluid or vaginal bleeding.  She does experience [place]is[place] Harley contractions.  Reiterated estimated fetal weight of the fetus is not an indication for induction of labor, but the patient states that she would indeed be interested in an SVE next ROP and plans for elective Pitocin IOL if cervix is favorable.  GBS POSITIVE; reviewed recommendation for IV antibiotic prophylaxis with active labor or spontaneous rupture of membranes.  36-week hemoglobin 12.1 g/dL.  Term labor precautions and danger signs and symptoms reviewed.  All questions answered.  Encouraged daily fetal movement counting and to call or return to clinic with any questions, concerns, or as needed.

## 2023-01-06 ENCOUNTER — MYC MEDICAL ADVICE (OUTPATIENT)
Dept: MIDWIFE SERVICES | Facility: CLINIC | Age: 23
End: 2023-01-06

## 2023-01-06 PROBLEM — Z34.90 NORMAL INTRAUTERINE PREGNANCY, ANTEPARTUM: Status: ACTIVE | Noted: 2022-07-07

## 2023-01-06 NOTE — TELEPHONE ENCOUNTER
"PHONE NOTE: Patient sent a MyChart message regarding abdominal cramping.  Called patient back to discuss.  Patient has been having some lower abdominal cramping which starts in the hips, sometimes radiates to her back.  She states though that she does not perceive it as \"coming and going\" but rather more consistent.  Would not call it \"pain.\"  No difficulty or pain or burning with urination.  No other signs or symptoms of UTI.  No abnormal discharge, just normal discharge, no odor no color no leaking of fluid like water.  No spotting or bleeding, no fever, no other symptoms of illness--denies coughing, aches and pains.  Patient is recovered COVID as she had a positive COVID test 11/4/2022.  Baby is active and reviewed parameters.  Patient is hydrating well says she has a 64 ounce water bottle which she drinks at least 2 full water bottles per day.  Her urine is light-colored.  Offered sleep meds but patient states \"I have had trouble in the past with medications that help me sleep so I do not want this, I will try to do this on my own.\"  Asked a lot of questions about normal discharge versus mucous plug versus abnormal discharge.  Relief/remedies include but not limited to Tylenol, warm bath, hydration, small frequent meals, other early labor instructions just in case.  Patient knows when to call/come in and has phone numbers.  Reviewed phone numbers to call rather than writing a MyChart message if more urgent or if patient thinks she is in labor or has any new symptoms.  Patient has a clinic visit scheduled for Monday and thinks that she would like to wait for evaluation until then.  Patient encouraged to call with any questions or issues.  "

## 2023-01-08 ENCOUNTER — TELEPHONE (OUTPATIENT)
Dept: OBGYN | Facility: CLINIC | Age: 23
End: 2023-01-08
Payer: COMMERCIAL

## 2023-01-08 ENCOUNTER — HOSPITAL ENCOUNTER (INPATIENT)
Facility: CLINIC | Age: 23
LOS: 4 days | Discharge: HOME | End: 2023-01-12
Attending: ADVANCED PRACTICE MIDWIFE | Admitting: ADVANCED PRACTICE MIDWIFE
Payer: COMMERCIAL

## 2023-01-08 DIAGNOSIS — Z98.891 S/P CESAREAN SECTION: ICD-10-CM

## 2023-01-08 DIAGNOSIS — O36.8390 FETAL HEART RATE DECELERATIONS AFFECTING MANAGEMENT OF MOTHER: Primary | ICD-10-CM

## 2023-01-08 PROBLEM — Z37.9 NORMAL LABOR: Status: ACTIVE | Noted: 2023-01-08

## 2023-01-08 LAB
ABO/RH(D): NORMAL
ALBUMIN MFR UR ELPH: 245.3 MG/DL (ref 1–14)
ALT SERPL W P-5'-P-CCNC: 29 U/L (ref 0–45)
ANTIBODY SCREEN: NEGATIVE
AST SERPL W P-5'-P-CCNC: 21 U/L (ref 0–40)
CREAT SERPL-MCNC: 0.62 MG/DL (ref 0.6–1.1)
CREAT UR-MCNC: 48 MG/DL
ERYTHROCYTE [DISTWIDTH] IN BLOOD BY AUTOMATED COUNT: 13.4 % (ref 10–15)
GFR SERPL CREATININE-BSD FRML MDRD: >90 ML/MIN/1.73M2
HCT VFR BLD AUTO: 36.3 % (ref 35–47)
HGB BLD-MCNC: 12.3 G/DL (ref 11.7–15.7)
MCH RBC QN AUTO: 31.2 PG (ref 26.5–33)
MCHC RBC AUTO-ENTMCNC: 33.9 G/DL (ref 31.5–36.5)
MCV RBC AUTO: 92 FL (ref 78–100)
PLATELET # BLD AUTO: 252 10E3/UL (ref 150–450)
PROT/CREAT 24H UR: 5.11 MG/MG CR
RBC # BLD AUTO: 3.94 10E6/UL (ref 3.8–5.2)
SPECIMEN EXPIRATION DATE: NORMAL
WBC # BLD AUTO: 16.7 10E3/UL (ref 4–11)

## 2023-01-08 PROCEDURE — 120N000001 HC R&B MED SURG/OB

## 2023-01-08 PROCEDURE — 86850 RBC ANTIBODY SCREEN: CPT | Performed by: ADVANCED PRACTICE MIDWIFE

## 2023-01-08 PROCEDURE — 86901 BLOOD TYPING SEROLOGIC RH(D): CPT | Performed by: ADVANCED PRACTICE MIDWIFE

## 2023-01-08 PROCEDURE — 99223 1ST HOSP IP/OBS HIGH 75: CPT | Performed by: ADVANCED PRACTICE MIDWIFE

## 2023-01-08 PROCEDURE — 85048 AUTOMATED LEUKOCYTE COUNT: CPT | Performed by: ADVANCED PRACTICE MIDWIFE

## 2023-01-08 PROCEDURE — 36415 COLL VENOUS BLD VENIPUNCTURE: CPT | Performed by: ADVANCED PRACTICE MIDWIFE

## 2023-01-08 PROCEDURE — 3E0P7VZ INTRODUCTION OF HORMONE INTO FEMALE REPRODUCTIVE, VIA NATURAL OR ARTIFICIAL OPENING: ICD-10-PCS | Performed by: ADVANCED PRACTICE MIDWIFE

## 2023-01-08 PROCEDURE — 82565 ASSAY OF CREATININE: CPT | Performed by: ADVANCED PRACTICE MIDWIFE

## 2023-01-08 PROCEDURE — 84156 ASSAY OF PROTEIN URINE: CPT | Performed by: ADVANCED PRACTICE MIDWIFE

## 2023-01-08 PROCEDURE — 84450 TRANSFERASE (AST) (SGOT): CPT | Performed by: ADVANCED PRACTICE MIDWIFE

## 2023-01-08 PROCEDURE — 84460 ALANINE AMINO (ALT) (SGPT): CPT | Performed by: ADVANCED PRACTICE MIDWIFE

## 2023-01-08 RX ORDER — FENTANYL CITRATE 50 UG/ML
50 INJECTION, SOLUTION INTRAMUSCULAR; INTRAVENOUS EVERY 30 MIN PRN
Status: DISCONTINUED | OUTPATIENT
Start: 2023-01-08 | End: 2023-01-10 | Stop reason: HOSPADM

## 2023-01-08 RX ORDER — ONDANSETRON 4 MG/1
4 TABLET, ORALLY DISINTEGRATING ORAL EVERY 6 HOURS PRN
Status: DISCONTINUED | OUTPATIENT
Start: 2023-01-08 | End: 2023-01-10 | Stop reason: HOSPADM

## 2023-01-08 RX ORDER — ONDANSETRON 2 MG/ML
4 INJECTION INTRAMUSCULAR; INTRAVENOUS EVERY 6 HOURS PRN
Status: DISCONTINUED | OUTPATIENT
Start: 2023-01-08 | End: 2023-01-10 | Stop reason: HOSPADM

## 2023-01-08 RX ORDER — CITRIC ACID/SODIUM CITRATE 334-500MG
30 SOLUTION, ORAL ORAL
Status: DISCONTINUED | OUTPATIENT
Start: 2023-01-08 | End: 2023-01-10 | Stop reason: HOSPADM

## 2023-01-08 RX ORDER — PROCHLORPERAZINE MALEATE 10 MG
10 TABLET ORAL EVERY 6 HOURS PRN
Status: DISCONTINUED | OUTPATIENT
Start: 2023-01-08 | End: 2023-01-10 | Stop reason: HOSPADM

## 2023-01-08 RX ORDER — METOCLOPRAMIDE HYDROCHLORIDE 5 MG/ML
10 INJECTION INTRAMUSCULAR; INTRAVENOUS EVERY 6 HOURS PRN
Status: DISCONTINUED | OUTPATIENT
Start: 2023-01-08 | End: 2023-01-10 | Stop reason: HOSPADM

## 2023-01-08 RX ORDER — NALOXONE HYDROCHLORIDE 0.4 MG/ML
0.2 INJECTION, SOLUTION INTRAMUSCULAR; INTRAVENOUS; SUBCUTANEOUS
Status: DISCONTINUED | OUTPATIENT
Start: 2023-01-08 | End: 2023-01-10 | Stop reason: HOSPADM

## 2023-01-08 RX ORDER — CARBOPROST TROMETHAMINE 250 UG/ML
250 INJECTION, SOLUTION INTRAMUSCULAR
Status: DISCONTINUED | OUTPATIENT
Start: 2023-01-08 | End: 2023-01-10

## 2023-01-08 RX ORDER — METHYLERGONOVINE MALEATE 0.2 MG/ML
200 INJECTION INTRAVENOUS
Status: DISCONTINUED | OUTPATIENT
Start: 2023-01-08 | End: 2023-01-10

## 2023-01-08 RX ORDER — OXYTOCIN/0.9 % SODIUM CHLORIDE 30/500 ML
100-340 PLASTIC BAG, INJECTION (ML) INTRAVENOUS CONTINUOUS PRN
Status: DISCONTINUED | OUTPATIENT
Start: 2023-01-08 | End: 2023-01-12 | Stop reason: HOSPADM

## 2023-01-08 RX ORDER — PROCHLORPERAZINE 25 MG
25 SUPPOSITORY, RECTAL RECTAL EVERY 12 HOURS PRN
Status: DISCONTINUED | OUTPATIENT
Start: 2023-01-08 | End: 2023-01-10 | Stop reason: HOSPADM

## 2023-01-08 RX ORDER — OXYTOCIN 10 [USP'U]/ML
10 INJECTION, SOLUTION INTRAMUSCULAR; INTRAVENOUS
Status: DISCONTINUED | OUTPATIENT
Start: 2023-01-08 | End: 2023-01-10 | Stop reason: HOSPADM

## 2023-01-08 RX ORDER — NALOXONE HYDROCHLORIDE 0.4 MG/ML
0.4 INJECTION, SOLUTION INTRAMUSCULAR; INTRAVENOUS; SUBCUTANEOUS
Status: DISCONTINUED | OUTPATIENT
Start: 2023-01-08 | End: 2023-01-10 | Stop reason: HOSPADM

## 2023-01-08 RX ORDER — KETOROLAC TROMETHAMINE 30 MG/ML
30 INJECTION, SOLUTION INTRAMUSCULAR; INTRAVENOUS
Status: DISCONTINUED | OUTPATIENT
Start: 2023-01-08 | End: 2023-01-12 | Stop reason: HOSPADM

## 2023-01-08 RX ORDER — IBUPROFEN 800 MG/1
800 TABLET, FILM COATED ORAL
Status: DISCONTINUED | OUTPATIENT
Start: 2023-01-08 | End: 2023-01-12 | Stop reason: HOSPADM

## 2023-01-08 RX ORDER — OXYTOCIN/0.9 % SODIUM CHLORIDE 30/500 ML
340 PLASTIC BAG, INJECTION (ML) INTRAVENOUS CONTINUOUS PRN
Status: DISCONTINUED | OUTPATIENT
Start: 2023-01-08 | End: 2023-01-10 | Stop reason: HOSPADM

## 2023-01-08 RX ORDER — MISOPROSTOL 200 UG/1
800 TABLET ORAL
Status: DISCONTINUED | OUTPATIENT
Start: 2023-01-08 | End: 2023-01-10

## 2023-01-08 RX ORDER — OXYTOCIN 10 [USP'U]/ML
10 INJECTION, SOLUTION INTRAMUSCULAR; INTRAVENOUS
Status: DISCONTINUED | OUTPATIENT
Start: 2023-01-08 | End: 2023-01-12 | Stop reason: HOSPADM

## 2023-01-08 RX ORDER — METOCLOPRAMIDE 10 MG/1
10 TABLET ORAL EVERY 6 HOURS PRN
Status: DISCONTINUED | OUTPATIENT
Start: 2023-01-08 | End: 2023-01-10 | Stop reason: HOSPADM

## 2023-01-08 RX ORDER — MISOPROSTOL 200 UG/1
400 TABLET ORAL
Status: DISCONTINUED | OUTPATIENT
Start: 2023-01-08 | End: 2023-01-10

## 2023-01-08 RX ORDER — LIDOCAINE 40 MG/G
CREAM TOPICAL
Status: DISCONTINUED | OUTPATIENT
Start: 2023-01-08 | End: 2023-01-10

## 2023-01-08 ASSESSMENT — ACTIVITIES OF DAILY LIVING (ADL)
DIFFICULTY_EATING/SWALLOWING: NO
ADLS_ACUITY_SCORE: 33
WALKING_OR_CLIMBING_STAIRS_DIFFICULTY: NO
VISION_MANAGEMENT: GLASSES
WEAR_GLASSES_OR_BLIND: YES
CHANGE_IN_FUNCTIONAL_STATUS_SINCE_ONSET_OF_CURRENT_ILLNESS/INJURY: NO
ADLS_ACUITY_SCORE: 22
DOING_ERRANDS_INDEPENDENTLY_DIFFICULTY: NO
ADLS_ACUITY_SCORE: 33
DRESSING/BATHING_DIFFICULTY: NO
TOILETING_ISSUES: NO
CONCENTRATING,_REMEMBERING_OR_MAKING_DECISIONS_DIFFICULTY: NO
FALL_HISTORY_WITHIN_LAST_SIX_MONTHS: NO

## 2023-01-08 NOTE — TELEPHONE ENCOUNTER
PHONE CALL TO ON-CALL CNM:  Calling with report of labor contractions.    They started about noon (were a pain level of 3-4 at that time).  Reports her contractions are now happening every few mintues, lasting 1 minute each, and she feels like her pain level is now at a 7.    Not leaking fluid.  Has mucous discharge, but no bloody show.  Baby is moving, but it feels less than what it normally is.    Recommended evaluation.  Patient agrees.  Was planning to go to [facility] for birth but they are on divert at this time.  Will go to [facility] for evaluation instead.

## 2023-01-09 PROBLEM — F43.10 POSTTRAUMATIC STRESS DISORDER: Status: ACTIVE | Noted: 2018-02-07

## 2023-01-09 PROBLEM — F32.A DEPRESSED: Status: ACTIVE | Noted: 2017-12-15

## 2023-01-09 PROBLEM — O26.849 UTERINE SIZE DATE DISCREPANCY, ANTEPARTUM: Status: ACTIVE | Noted: 2022-12-26

## 2023-01-09 PROBLEM — Z37.9 NORMAL LABOR: Status: RESOLVED | Noted: 2023-01-08 | Resolved: 2023-01-09

## 2023-01-09 PROBLEM — O14.93 PRE-ECLAMPSIA IN THIRD TRIMESTER: Status: ACTIVE | Noted: 2023-01-09

## 2023-01-09 PROBLEM — O98.519 COVID-19 AFFECTING PREGNANCY, ANTEPARTUM: Status: ACTIVE | Noted: 2022-11-07

## 2023-01-09 PROBLEM — O99.820 GBS (GROUP B STREPTOCOCCUS CARRIER), +RV CULTURE, CURRENTLY PREGNANT: Status: ACTIVE | Noted: 2022-12-16

## 2023-01-09 PROBLEM — A60.04 HERPES SIMPLEX VULVOVAGINITIS: Status: ACTIVE | Noted: 2019-04-24

## 2023-01-09 PROBLEM — F33.1 MAJOR DEPRESSIVE DISORDER, RECURRENT, MODERATE (H): Status: ACTIVE | Noted: 2018-02-07

## 2023-01-09 PROBLEM — O09.93 SUPERVISION OF HIGH RISK PREGNANCY IN THIRD TRIMESTER: Status: ACTIVE | Noted: 2022-07-07

## 2023-01-09 PROBLEM — U07.1 COVID-19 AFFECTING PREGNANCY, ANTEPARTUM: Status: ACTIVE | Noted: 2022-11-07

## 2023-01-09 PROBLEM — Z34.90 ENCOUNTER FOR INDUCTION OF LABOR: Status: ACTIVE | Noted: 2023-01-09

## 2023-01-09 PROCEDURE — 120N000001 HC R&B MED SURG/OB

## 2023-01-09 PROCEDURE — 250N000011 HC RX IP 250 OP 636: Performed by: ADVANCED PRACTICE MIDWIFE

## 2023-01-09 PROCEDURE — 250N000013 HC RX MED GY IP 250 OP 250 PS 637: Performed by: ADVANCED PRACTICE MIDWIFE

## 2023-01-09 PROCEDURE — 3E0D7GC INTRODUCTION OF OTHER THERAPEUTIC SUBSTANCE INTO MOUTH AND PHARYNX, VIA NATURAL OR ARTIFICIAL OPENING: ICD-10-PCS | Performed by: OBSTETRICS & GYNECOLOGY

## 2023-01-09 RX ORDER — MAGNESIUM SULFATE HEPTAHYDRATE 500 MG/ML
10 INJECTION, SOLUTION INTRAMUSCULAR; INTRAVENOUS
Status: DISCONTINUED | OUTPATIENT
Start: 2023-01-09 | End: 2023-01-12 | Stop reason: HOSPADM

## 2023-01-09 RX ORDER — MORPHINE SULFATE 10 MG/ML
10 INJECTION, SOLUTION INTRAMUSCULAR; INTRAVENOUS
Status: COMPLETED | OUTPATIENT
Start: 2023-01-09 | End: 2023-01-09

## 2023-01-09 RX ORDER — TERBUTALINE SULFATE 1 MG/ML
0.25 INJECTION, SOLUTION SUBCUTANEOUS
Status: DISCONTINUED | OUTPATIENT
Start: 2023-01-09 | End: 2023-01-10 | Stop reason: HOSPADM

## 2023-01-09 RX ORDER — HYDROXYZINE HYDROCHLORIDE 25 MG/1
50 TABLET, FILM COATED ORAL ONCE
Status: COMPLETED | OUTPATIENT
Start: 2023-01-09 | End: 2023-01-09

## 2023-01-09 RX ORDER — MISOPROSTOL 100 UG/1
25 TABLET ORAL
Status: DISCONTINUED | OUTPATIENT
Start: 2023-01-09 | End: 2023-01-10 | Stop reason: HOSPADM

## 2023-01-09 RX ORDER — MAGNESIUM SULFATE 4 G/50ML
4 INJECTION INTRAVENOUS
Status: DISCONTINUED | OUTPATIENT
Start: 2023-01-09 | End: 2023-01-12 | Stop reason: HOSPADM

## 2023-01-09 RX ORDER — HYDROXYZINE HYDROCHLORIDE 25 MG/1
100 TABLET, FILM COATED ORAL
Status: ACTIVE | OUTPATIENT
Start: 2023-01-09 | End: 2023-01-10

## 2023-01-09 RX ORDER — LORAZEPAM 2 MG/ML
2 INJECTION INTRAMUSCULAR
Status: DISCONTINUED | OUTPATIENT
Start: 2023-01-09 | End: 2023-01-12 | Stop reason: HOSPADM

## 2023-01-09 RX ORDER — VALACYCLOVIR HYDROCHLORIDE 500 MG/1
500 TABLET, FILM COATED ORAL EVERY 12 HOURS SCHEDULED
Status: DISCONTINUED | OUTPATIENT
Start: 2023-01-09 | End: 2023-01-12 | Stop reason: HOSPADM

## 2023-01-09 RX ORDER — MAGNESIUM SULFATE HEPTAHYDRATE 40 MG/ML
2 INJECTION, SOLUTION INTRAVENOUS
Status: DISCONTINUED | OUTPATIENT
Start: 2023-01-09 | End: 2023-01-12 | Stop reason: HOSPADM

## 2023-01-09 RX ORDER — SODIUM CHLORIDE, SODIUM LACTATE, POTASSIUM CHLORIDE, CALCIUM CHLORIDE 600; 310; 30; 20 MG/100ML; MG/100ML; MG/100ML; MG/100ML
10-125 INJECTION, SOLUTION INTRAVENOUS CONTINUOUS
Status: DISCONTINUED | OUTPATIENT
Start: 2023-01-09 | End: 2023-01-12 | Stop reason: HOSPADM

## 2023-01-09 RX ORDER — LABETALOL HYDROCHLORIDE 5 MG/ML
20-40 INJECTION, SOLUTION INTRAVENOUS EVERY 10 MIN PRN
Status: DISCONTINUED | OUTPATIENT
Start: 2023-01-09 | End: 2023-01-12 | Stop reason: HOSPADM

## 2023-01-09 RX ORDER — HYDRALAZINE HYDROCHLORIDE 20 MG/ML
5-10 INJECTION INTRAMUSCULAR; INTRAVENOUS
Status: DISCONTINUED | OUTPATIENT
Start: 2023-01-09 | End: 2023-01-12 | Stop reason: HOSPADM

## 2023-01-09 RX ADMIN — DINOPROSTONE 10 MG: 10 INSERT VAGINAL at 03:24

## 2023-01-09 RX ADMIN — HYDROXYZINE HYDROCHLORIDE 50 MG: 25 TABLET ORAL at 12:33

## 2023-01-09 RX ADMIN — MISOPROSTOL 25 MCG: 100 TABLET ORAL at 22:00

## 2023-01-09 RX ADMIN — MORPHINE SULFATE 10 MG: 10 INJECTION INTRAVENOUS at 21:28

## 2023-01-09 RX ADMIN — VALACYCLOVIR HYDROCHLORIDE 500 MG: 500 TABLET, FILM COATED ORAL at 09:16

## 2023-01-09 RX ADMIN — VALACYCLOVIR HYDROCHLORIDE 500 MG: 500 TABLET, FILM COATED ORAL at 21:28

## 2023-01-09 RX ADMIN — MISOPROSTOL 25 MCG: 100 TABLET ORAL at 20:02

## 2023-01-09 RX ADMIN — MISOPROSTOL 25 MCG: 100 TABLET ORAL at 18:01

## 2023-01-09 ASSESSMENT — ACTIVITIES OF DAILY LIVING (ADL)
ADLS_ACUITY_SCORE: 22

## 2023-01-09 NOTE — PROVIDER NOTIFICATION
Cassidy Fernandes, , 38w1d, arrived to triage. Patient here with c/o contractions. Patient under care of contractions.      Patient reports contractions. Patient rates pain at 5/10.. Patient denies SROM. Patient denies vaginal bleeding or change in discharge. Patient denies s/s of preeclampsia. Fetal movement Present. Patient denies issues this pregnancy. Pt denies headache, blurred vision, upper gastric pain, no clonus, no pitting edema and +1 reflex.     Patient oriented to room, call light in reach. EFM and toco applied. Triage orders placed.  Provider updated, Belkis Oconnell, BERKLEY.    CNM ordered HELLP labs and monitor for another hour. No recheck if contraction pattern stays the same.    Susu Simeon RN
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
## 2023-01-09 NOTE — CONSULTS
Integrative Therapy Consult    Healing PresenceYes  Essential Oils: Topical (EO/Topical Oil)     Labor Massage Oil - HC       Healing Music:       Breathwork:       Guided Imagery:       Acupressure: Hands on Li4     Oshibori:       Energy Therapy:       Healing Touch:       Reiki:       Qi Gong:     Massage: Foot, Targeted massage      Targeted Massage: Legs  Sleep Promotion:       Other Therapy:       Intervention Reason: Labor     Pre and Post Session Scores: Patient Desires Treatment: yes                             Delivery:         Referrals:      Adele L Born    Pt would like to seen tomorrow 1/10
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
## 2023-01-09 NOTE — PHARMACY-ADMISSION MEDICATION HISTORY
Pharmacy Note - Admission Medication History    Pertinent Provider Information:    ______________________________________________________________________    Prior To Admission (PTA) med list completed and updated in EMR.       PTA Med List   Medication Sig Last Dose     albuterol (PROAIR HFA/PROVENTIL HFA/VENTOLIN HFA) 108 (90 Base) MCG/ACT inhaler Inhale 2 puffs into the lungs every 6 hours as needed for shortness of breath / dyspnea or wheezing . No further refills until follow-up appointment More than a month     cyanocobalamin (VITAMIN B-12) 1000 MCG tablet Take 1 tablet (1,000 mcg) by mouth daily 1/8/2023     ferrous sulfate (FEROSUL) 325 (65 Fe) MG tablet Take 1 tablet (325 mg) by mouth daily (with breakfast) 1/8/2023     Prenatal Vit-Fe Fumarate-FA (PRENATAL MULTIVITAMIN W/IRON) 27-0.8 MG tablet Take 1 tablet by mouth daily 1/8/2023     valACYclovir (VALTREX) 500 MG tablet Take 1 tablet (500 mg) by mouth 2 times daily 1/8/2023     vitamin C (ASCORBIC ACID) 250 MG tablet Take 1 tablet (250 mg) by mouth daily 1/8/2023       Information source(s): Patient and CareEverywhere/SureScripts    Method of interview communication: in-person and phone    Patient was asked about OTC/herbal products specifically.  PTA med list reflects this.    Based on the pharmacist's assessment, the PTA med list information appears reliable    Allergies were reviewed, assessed, and updated with the patient.      Patient does not anticipate needing any multi-use medications during admission.     Thank you for the opportunity to participate in the care of this patient.      Samina Linn RPH     1/9/2023     9:37 AM

## 2023-01-09 NOTE — PROVIDER NOTIFICATION
Elio called and notified about pt labs. Sve the same. CNM orders for pt to be admitted and she'll be on the way to see pt

## 2023-01-09 NOTE — PROGRESS NOTES
"Labor Progress Note  2023 5:45 AM     Patient Name:  Cassidy Fernandes  :      2000  MRN:      [id number]      Assessment:  22 year old  at 38w2d weeks gestation  Preeclampsia, not severe range  Cervical ripening with Cervidil   FHR Category 1 Tracing  GBS positive  Exercise induced asthma  Adjustment disorder with depression and anxiety  PTSD  Maternal obesity  More than recommended weight gain  Covid 19 positive 22  EFW >90% 3418 g/7#8 oz on 22      Plan:   Continue cervical ripening with Cervidil  Plan to start IV antibiotics for GBS prophylaxis with SROM or active labor  Encourage position changes and rest  CNM support as needed  Anticipate   Reevaluate as needed with change in status   B. BERKLEY Hernandes to assume care at 0600    Subjective:  Cassidy Fernandes was able to rest some overnight. Family remains at bedside. Continues to deny any headache, visual changes or epigastric pain.     Objective:  /77 (BP Location: Left arm, Patient Position: Semi-Matos's, Cuff Size: Adult Regular)   Temp 98  F (36.7  C) (Oral)   Resp 18   Ht 1.651 m (5' 5\")   Wt 105.2 kg (232 lb)   LMP 2022 (Exact Date)   SpO2 98%   BMI 38.61 kg/m       baseline, modrate variability accelerations present, no decelerations   Uterine contractions: every 6-8 minutes  SVE: deferred   Cervidil placed per RN at 0330    Provider: MARY Hunt, BERKLEY, HILDA      "

## 2023-01-09 NOTE — PROGRESS NOTES
"Labor Progress Note  2023 12:03 AM     Patient Name:  Cassidy Fernandes  :      2000  MRN:      [id number]      Assessment:  22 year old  at 38w2d weeks gestation  Early Labor  Preeclampsia  FHR Category 1 Tracing  GBS positive  Exercise induced asthma  Adjustment disorder with depression and anxiety  PTSD  Maternal obesity  More than recommended weight gain  Covid 19 positive 22  EFW >90% 3418 g/7#8 oz on 22    Plan:   Allow patient to eat and then encourage sleep  Expectant management at this time  Plan to start IV antibiotics for GBS prophylaxis with SROM or active labor  Patient declined sleep medications at this time  Encourage position changes  CNM support as needed  Anticipate   Reevaluate in 2-4 hours or sooner with change in status     Subjective:  Cassidy Fernandes states she has been able to ignore her contractions and hold a conversation with her sister. Denies headache, visual changes or epigastric pain. Denies any leakage of fluid. Patient agreeable to SVE at this time.     Objective:  /83 (BP Location: Left arm, Patient Position: Semi-Matos's, Cuff Size: Adult Regular)   Temp 98.3  F (36.8  C) (Oral)   Resp 18   Ht 1.651 m (5' 5\")   Wt 105.2 kg (232 lb)   LMP 2022 (Exact Date)   SpO2 97%   BMI 38.61 kg/m       baseline, moderate variability accelerations present, no decelerations   Uterine contractions: every 2-3 minutes  SVE: 1cm/50 % effaced /0 station/anterior/medium consistency, Ramirez 8    Provider: MARY Hunt, BERKLEY, HILDA    Total time spent with patient:20 minutes, >50% time spent counseling and coordination of care.  "

## 2023-01-09 NOTE — PROGRESS NOTES
Labor Progress Note:    Patient Name:  Cassidy Fernandes  :      2000  MRN:      [id number]        Subjective:  Cassidy is doing well this morning. Coping easily with mild contractions.  States that the contractions she experienced yesterday evening were much more intense than what is happening now. Has rested on/off through the night and this morning, but hasn't had a block of solid sleep.  Declined sleep meds last night as she feels she doesn't tolerate meds like that well.    Plans to eat this morning, then shower to relax herself, and then will try to nap from mid-late morning into early afternoon.    Excited to meet baby, but patient with process of IOL at this point.  Knows that the process could take a few days, yet hopeful it will go more quickly than that.    Partner Javy here, sister here and another sister coming for a brief visit.  Javy hasn't rested much either.      Objective:    FHR: 122, mod variability, accels present, no decels.   Contractions: q 5-10 minutes, mild to palpation.  Cervix: exam deferred at this time.  Other: No HA, no visual changes, no epigastric pain, no edema.  Meds:  Cervidil in place and due to be removed at/before 1524.      Assessment:    - Pre-eclampsia without severe features, diagnosed last evening.  Subsequent IOL.  - History of what appeared to be early labor yesterday afternoon/evening.  This fizzled out at about 0100 today, therefore cervical ripening with Cervidil initiated at 0324 due to diagnosis of pre-eclampsia without spontaneous labor.  Cervidil due to be removed at/before 1524 this afternoon. Further plan for continued IOL will be made when Cervidil removal.  - Category I FHTs  - GBS positive  - History of genital herpes, on prophylaxis  - Maternal obesity with higher than recommended weight gain  - Fetal EFW >90% at US on 22.  - Other:    Exercise induced asthma    Adjustment disorder with depression and anxeity    PTSD    Covid 19 positive  11/4/22         Plan:   - Long discussion with couple, RNs present for this.  Answered questions about a variety of topics that the couple had including the early labor vs false labor vs other she experienced last night, pre-eclampsia diagnosis, IOL process and what the next steps might be when cervidil is removed at 1500, encouragement of sleep/rest and the use of sleep meds PRN, pain medication options in labor, size of baby and how that may impact labor, what situations may warrant the decision for a c-birth, etc.  Strongly encouraged a good nap this morning into early afternoon, talking about the impact that lack of sleep and fatigue can have on the labor process.  The couple agrees and will eat breakfast, and do calming activities (like a shower for Cassidy) and then will nap.  - Pre-eclampsia order set put in computer.  At this time Cassidy does not have severe range BP which warrant medication management, or any severe features of pre-eclampsia.  Will continue to observe for these throughout the labor process.  Did swetha on order set an order use hydralazine if BP become severe range (hydralazine chosen due to patients history of asthma and the recommendation to not use labatolol in patients with asthma).   - Ordered valtrex for Cassidy to continue to take during IOL process.  Last dose for prophylaxis of genital herpes was yesterday AM.  To restart twice daily dosing this AM.  - Plan initiation of GBS prophylaxis with ROM or onset of active labor.  - Cassidy plans use of nitrous oxide or fentanyl during labor.  Plans to only use epidural if needs it.    -Routine CNM support & management. Encourage position changes, ambulation, rest as desired.  -Anticipate progress in IOL process.      Carmen HERNANDEZ CNM will assume care of patient at 1200.        Total time spent with patient: 40 minutes, of which >50% time spent counseling and coordination of care.      Provider:  Kylah Hernandes,  APRN/CNM        Date:  1/9/2023  Time:  9:27 AM

## 2023-01-09 NOTE — PLAN OF CARE
Blood pressures stable this shift, none in severe range. See flowsheet. Patient reports no HA, visual changes or epigastric pain. Reflexes WNL, no clonus.  EFM category I. Contractions spaced out, every 3-7 minutes, palpate mild-moderate.   Cervidil placed at 0324, SVE 1/50/0.     Problem: Labor  Goal: Stable Fetal Wellbeing  Outcome: Progressing     Problem: Hypertensive Disorders in Pregnancy  Goal: Maternal-Fetal Stabilization  Outcome: Progressing

## 2023-01-09 NOTE — PROVIDER NOTIFICATION
Phone call to Carmen Kuhn to update on pt need for medication to help with therapeutic rest. Orders for atarax 50 mg now.

## 2023-01-10 ENCOUNTER — ANESTHESIA (OUTPATIENT)
Dept: OBGYN | Facility: CLINIC | Age: 23
End: 2023-01-10
Payer: COMMERCIAL

## 2023-01-10 ENCOUNTER — ANESTHESIA EVENT (OUTPATIENT)
Dept: OBGYN | Facility: CLINIC | Age: 23
End: 2023-01-10
Payer: COMMERCIAL

## 2023-01-10 PROBLEM — Z98.891 S/P CESAREAN SECTION: Status: ACTIVE | Noted: 2023-01-10

## 2023-01-10 PROBLEM — M54.6 PAIN IN THORACIC SPINE: Status: RESOLVED | Noted: 2017-05-17 | Resolved: 2023-01-10

## 2023-01-10 PROBLEM — O36.8390 FETAL HEART RATE DECELERATIONS AFFECTING MANAGEMENT OF MOTHER: Status: ACTIVE | Noted: 2023-01-10

## 2023-01-10 PROBLEM — O21.9 NAUSEA AND VOMITING IN PREGNANCY: Status: RESOLVED | Noted: 2022-12-12 | Resolved: 2023-01-10

## 2023-01-10 LAB
ALBUMIN MFR UR ELPH: 12.9 MG/DL (ref 1–14)
ALT SERPL W P-5'-P-CCNC: 32 U/L (ref 0–45)
APTT PPP: 24 SECONDS (ref 22–38)
AST SERPL W P-5'-P-CCNC: 26 U/L (ref 0–40)
CREAT SERPL-MCNC: 0.66 MG/DL (ref 0.6–1.1)
CREAT UR-MCNC: 66 MG/DL
ERYTHROCYTE [DISTWIDTH] IN BLOOD BY AUTOMATED COUNT: 13.6 % (ref 10–15)
GFR SERPL CREATININE-BSD FRML MDRD: >90 ML/MIN/1.73M2
HCT VFR BLD AUTO: 38.9 % (ref 35–47)
HGB BLD-MCNC: 13.2 G/DL (ref 11.7–15.7)
INR PPP: 1.08 (ref 0.85–1.15)
MCH RBC QN AUTO: 31 PG (ref 26.5–33)
MCHC RBC AUTO-ENTMCNC: 33.9 G/DL (ref 31.5–36.5)
MCV RBC AUTO: 91 FL (ref 78–100)
PLATELET # BLD AUTO: 263 10E3/UL (ref 150–450)
PROT/CREAT 24H UR: 0.2 MG/MG CR
RBC # BLD AUTO: 4.26 10E6/UL (ref 3.8–5.2)
WBC # BLD AUTO: 17.8 10E3/UL (ref 4–11)

## 2023-01-10 PROCEDURE — 250N000009 HC RX 250: Performed by: ANESTHESIOLOGY

## 2023-01-10 PROCEDURE — 360N000076 HC SURGERY LEVEL 3, PER MIN: Performed by: OBSTETRICS & GYNECOLOGY

## 2023-01-10 PROCEDURE — 258N000003 HC RX IP 258 OP 636: Performed by: MIDWIFE

## 2023-01-10 PROCEDURE — 00HU33Z INSERTION OF INFUSION DEVICE INTO SPINAL CANAL, PERCUTANEOUS APPROACH: ICD-10-PCS | Performed by: ANESTHESIOLOGY

## 2023-01-10 PROCEDURE — 250N000009 HC RX 250: Performed by: NURSE ANESTHETIST, CERTIFIED REGISTERED

## 2023-01-10 PROCEDURE — 258N000003 HC RX IP 258 OP 636: Performed by: ADVANCED PRACTICE MIDWIFE

## 2023-01-10 PROCEDURE — 85027 COMPLETE CBC AUTOMATED: CPT | Performed by: MIDWIFE

## 2023-01-10 PROCEDURE — 258N000003 HC RX IP 258 OP 636: Performed by: NURSE ANESTHETIST, CERTIFIED REGISTERED

## 2023-01-10 PROCEDURE — 85610 PROTHROMBIN TIME: CPT | Performed by: MIDWIFE

## 2023-01-10 PROCEDURE — 250N000013 HC RX MED GY IP 250 OP 250 PS 637: Performed by: ADVANCED PRACTICE MIDWIFE

## 2023-01-10 PROCEDURE — 999N000157 HC STATISTIC RCP TIME EA 10 MIN

## 2023-01-10 PROCEDURE — 250N000011 HC RX IP 250 OP 636: Performed by: OBSTETRICS & GYNECOLOGY

## 2023-01-10 PROCEDURE — 250N000011 HC RX IP 250 OP 636: Performed by: MIDWIFE

## 2023-01-10 PROCEDURE — 99233 SBSQ HOSP IP/OBS HIGH 50: CPT | Performed by: MIDWIFE

## 2023-01-10 PROCEDURE — 370N000003 HC ANESTHESIA WARD SERVICE

## 2023-01-10 PROCEDURE — 84460 ALANINE AMINO (ALT) (SGPT): CPT | Performed by: MIDWIFE

## 2023-01-10 PROCEDURE — 272N000001 HC OR GENERAL SUPPLY STERILE: Performed by: OBSTETRICS & GYNECOLOGY

## 2023-01-10 PROCEDURE — 36415 COLL VENOUS BLD VENIPUNCTURE: CPT | Performed by: MIDWIFE

## 2023-01-10 PROCEDURE — 84450 TRANSFERASE (AST) (SGOT): CPT | Performed by: MIDWIFE

## 2023-01-10 PROCEDURE — 82565 ASSAY OF CREATININE: CPT | Performed by: MIDWIFE

## 2023-01-10 PROCEDURE — 370N000017 HC ANESTHESIA TECHNICAL FEE, PER MIN: Performed by: OBSTETRICS & GYNECOLOGY

## 2023-01-10 PROCEDURE — 85730 THROMBOPLASTIN TIME PARTIAL: CPT | Performed by: MIDWIFE

## 2023-01-10 PROCEDURE — 250N000011 HC RX IP 250 OP 636: Performed by: ADVANCED PRACTICE MIDWIFE

## 2023-01-10 PROCEDURE — 84156 ASSAY OF PROTEIN URINE: CPT | Performed by: MIDWIFE

## 2023-01-10 PROCEDURE — 250N000011 HC RX IP 250 OP 636: Performed by: NURSE ANESTHETIST, CERTIFIED REGISTERED

## 2023-01-10 PROCEDURE — 120N000001 HC R&B MED SURG/OB

## 2023-01-10 PROCEDURE — 999N000016 HC STATISTIC ATTENDANCE AT DELIVERY

## 2023-01-10 PROCEDURE — 250N000011 HC RX IP 250 OP 636: Performed by: ANESTHESIOLOGY

## 2023-01-10 RX ORDER — MORPHINE SULFATE 1 MG/ML
2 INJECTION, SOLUTION EPIDURAL; INTRATHECAL; INTRAVENOUS ONCE
Status: DISCONTINUED | OUTPATIENT
Start: 2023-01-10 | End: 2023-01-10 | Stop reason: HOSPADM

## 2023-01-10 RX ORDER — CARBOPROST TROMETHAMINE 250 UG/ML
250 INJECTION, SOLUTION INTRAMUSCULAR
Status: DISCONTINUED | OUTPATIENT
Start: 2023-01-10 | End: 2023-01-10 | Stop reason: HOSPADM

## 2023-01-10 RX ORDER — SODIUM CHLORIDE, SODIUM LACTATE, POTASSIUM CHLORIDE, CALCIUM CHLORIDE 600; 310; 30; 20 MG/100ML; MG/100ML; MG/100ML; MG/100ML
INJECTION, SOLUTION INTRAVENOUS CONTINUOUS
Status: DISCONTINUED | OUTPATIENT
Start: 2023-01-10 | End: 2023-01-10 | Stop reason: HOSPADM

## 2023-01-10 RX ORDER — LIDOCAINE 40 MG/G
CREAM TOPICAL
Status: DISCONTINUED | OUTPATIENT
Start: 2023-01-10 | End: 2023-01-10 | Stop reason: HOSPADM

## 2023-01-10 RX ORDER — HYDROCORTISONE 25 MG/G
CREAM TOPICAL 3 TIMES DAILY PRN
Status: DISCONTINUED | OUTPATIENT
Start: 2023-01-10 | End: 2023-01-12 | Stop reason: HOSPADM

## 2023-01-10 RX ORDER — METOCLOPRAMIDE HYDROCHLORIDE 5 MG/ML
10 INJECTION INTRAMUSCULAR; INTRAVENOUS EVERY 6 HOURS PRN
Status: DISCONTINUED | OUTPATIENT
Start: 2023-01-10 | End: 2023-01-12 | Stop reason: HOSPADM

## 2023-01-10 RX ORDER — KETOROLAC TROMETHAMINE 30 MG/ML
30 INJECTION, SOLUTION INTRAMUSCULAR; INTRAVENOUS EVERY 6 HOURS
Status: DISPENSED | OUTPATIENT
Start: 2023-01-11 | End: 2023-01-11

## 2023-01-10 RX ORDER — LIDOCAINE HYDROCHLORIDE AND EPINEPHRINE 15; 5 MG/ML; UG/ML
INJECTION, SOLUTION EPIDURAL PRN
Status: DISCONTINUED | OUTPATIENT
Start: 2023-01-10 | End: 2023-01-10

## 2023-01-10 RX ORDER — METHYLERGONOVINE MALEATE 0.2 MG/ML
200 INJECTION INTRAVENOUS
Status: DISCONTINUED | OUTPATIENT
Start: 2023-01-10 | End: 2023-01-10 | Stop reason: HOSPADM

## 2023-01-10 RX ORDER — CEFAZOLIN SODIUM 2 G/100ML
2 INJECTION, SOLUTION INTRAVENOUS SEE ADMIN INSTRUCTIONS
Status: DISCONTINUED | OUTPATIENT
Start: 2023-01-10 | End: 2023-01-10 | Stop reason: HOSPADM

## 2023-01-10 RX ORDER — ACETAMINOPHEN 325 MG/1
975 TABLET ORAL EVERY 6 HOURS
Status: DISCONTINUED | OUTPATIENT
Start: 2023-01-11 | End: 2023-01-12 | Stop reason: HOSPADM

## 2023-01-10 RX ORDER — OXYTOCIN 10 [USP'U]/ML
10 INJECTION, SOLUTION INTRAMUSCULAR; INTRAVENOUS
Status: DISCONTINUED | OUTPATIENT
Start: 2023-01-10 | End: 2023-01-12 | Stop reason: HOSPADM

## 2023-01-10 RX ORDER — AMOXICILLIN 250 MG
2 CAPSULE ORAL 2 TIMES DAILY
Status: DISCONTINUED | OUTPATIENT
Start: 2023-01-11 | End: 2023-01-12 | Stop reason: HOSPADM

## 2023-01-10 RX ORDER — CEFAZOLIN SODIUM 2 G/100ML
2 INJECTION, SOLUTION INTRAVENOUS
Status: COMPLETED | OUTPATIENT
Start: 2023-01-10 | End: 2023-01-10

## 2023-01-10 RX ORDER — ONDANSETRON 2 MG/ML
4 INJECTION INTRAMUSCULAR; INTRAVENOUS EVERY 6 HOURS PRN
Status: DISCONTINUED | OUTPATIENT
Start: 2023-01-10 | End: 2023-01-12 | Stop reason: HOSPADM

## 2023-01-10 RX ORDER — FENTANYL CITRATE-0.9 % NACL/PF 10 MCG/ML
PLASTIC BAG, INJECTION (ML) INTRAVENOUS
Status: COMPLETED
Start: 2023-01-10 | End: 2023-01-10

## 2023-01-10 RX ORDER — OXYTOCIN/0.9 % SODIUM CHLORIDE 30/500 ML
100-340 PLASTIC BAG, INJECTION (ML) INTRAVENOUS CONTINUOUS PRN
Status: DISCONTINUED | OUTPATIENT
Start: 2023-01-10 | End: 2023-01-12 | Stop reason: HOSPADM

## 2023-01-10 RX ORDER — SIMETHICONE 80 MG
80 TABLET,CHEWABLE ORAL 4 TIMES DAILY PRN
Status: DISCONTINUED | OUTPATIENT
Start: 2023-01-10 | End: 2023-01-12 | Stop reason: HOSPADM

## 2023-01-10 RX ORDER — MORPHINE SULFATE 1 MG/ML
INJECTION, SOLUTION EPIDURAL; INTRATHECAL; INTRAVENOUS PRN
Status: DISCONTINUED | OUTPATIENT
Start: 2023-01-10 | End: 2023-01-10

## 2023-01-10 RX ORDER — LIDOCAINE 40 MG/G
CREAM TOPICAL
Status: DISCONTINUED | OUTPATIENT
Start: 2023-01-10 | End: 2023-01-12 | Stop reason: HOSPADM

## 2023-01-10 RX ORDER — NALBUPHINE HYDROCHLORIDE 10 MG/ML
2.5-5 INJECTION, SOLUTION INTRAMUSCULAR; INTRAVENOUS; SUBCUTANEOUS EVERY 6 HOURS PRN
Status: DISCONTINUED | OUTPATIENT
Start: 2023-01-10 | End: 2023-01-12 | Stop reason: HOSPADM

## 2023-01-10 RX ORDER — ONDANSETRON 4 MG/1
4 TABLET, ORALLY DISINTEGRATING ORAL EVERY 6 HOURS PRN
Status: DISCONTINUED | OUTPATIENT
Start: 2023-01-10 | End: 2023-01-10 | Stop reason: HOSPADM

## 2023-01-10 RX ORDER — OXYTOCIN/0.9 % SODIUM CHLORIDE 30/500 ML
340 PLASTIC BAG, INJECTION (ML) INTRAVENOUS CONTINUOUS PRN
Status: DISCONTINUED | OUTPATIENT
Start: 2023-01-10 | End: 2023-01-12 | Stop reason: HOSPADM

## 2023-01-10 RX ORDER — PENICILLIN G 3000000 [IU]/50ML
3 INJECTION, SOLUTION INTRAVENOUS EVERY 4 HOURS
Status: DISCONTINUED | OUTPATIENT
Start: 2023-01-10 | End: 2023-01-10 | Stop reason: HOSPADM

## 2023-01-10 RX ORDER — IBUPROFEN 800 MG/1
800 TABLET, FILM COATED ORAL EVERY 6 HOURS
Status: DISCONTINUED | OUTPATIENT
Start: 2023-01-11 | End: 2023-01-12 | Stop reason: HOSPADM

## 2023-01-10 RX ORDER — LIDOCAINE HCL/EPINEPHRINE/PF 2%-1:200K
VIAL (ML) INJECTION PRN
Status: DISCONTINUED | OUTPATIENT
Start: 2023-01-10 | End: 2023-01-10

## 2023-01-10 RX ORDER — AMOXICILLIN 250 MG
1 CAPSULE ORAL 2 TIMES DAILY
Status: DISCONTINUED | OUTPATIENT
Start: 2023-01-11 | End: 2023-01-12 | Stop reason: HOSPADM

## 2023-01-10 RX ORDER — PROCHLORPERAZINE 25 MG
25 SUPPOSITORY, RECTAL RECTAL EVERY 12 HOURS PRN
Status: DISCONTINUED | OUTPATIENT
Start: 2023-01-10 | End: 2023-01-12 | Stop reason: HOSPADM

## 2023-01-10 RX ORDER — CARBOPROST TROMETHAMINE 250 UG/ML
250 INJECTION, SOLUTION INTRAMUSCULAR
Status: DISCONTINUED | OUTPATIENT
Start: 2023-01-10 | End: 2023-01-12 | Stop reason: HOSPADM

## 2023-01-10 RX ORDER — OXYTOCIN/0.9 % SODIUM CHLORIDE 30/500 ML
PLASTIC BAG, INJECTION (ML) INTRAVENOUS CONTINUOUS PRN
Status: DISCONTINUED | OUTPATIENT
Start: 2023-01-10 | End: 2023-01-10

## 2023-01-10 RX ORDER — ONDANSETRON 2 MG/ML
INJECTION INTRAMUSCULAR; INTRAVENOUS PRN
Status: DISCONTINUED | OUTPATIENT
Start: 2023-01-10 | End: 2023-01-10

## 2023-01-10 RX ORDER — DEXTROSE, SODIUM CHLORIDE, SODIUM LACTATE, POTASSIUM CHLORIDE, AND CALCIUM CHLORIDE 5; .6; .31; .03; .02 G/100ML; G/100ML; G/100ML; G/100ML; G/100ML
INJECTION, SOLUTION INTRAVENOUS CONTINUOUS
Status: DISCONTINUED | OUTPATIENT
Start: 2023-01-11 | End: 2023-01-12 | Stop reason: HOSPADM

## 2023-01-10 RX ORDER — ONDANSETRON 4 MG/1
4 TABLET, ORALLY DISINTEGRATING ORAL EVERY 6 HOURS PRN
Status: DISCONTINUED | OUTPATIENT
Start: 2023-01-10 | End: 2023-01-12 | Stop reason: HOSPADM

## 2023-01-10 RX ORDER — MISOPROSTOL 200 UG/1
400 TABLET ORAL
Status: DISCONTINUED | OUTPATIENT
Start: 2023-01-10 | End: 2023-01-12 | Stop reason: HOSPADM

## 2023-01-10 RX ORDER — ACETAMINOPHEN 325 MG/1
975 TABLET ORAL ONCE
Status: DISCONTINUED | OUTPATIENT
Start: 2023-01-10 | End: 2023-01-10 | Stop reason: HOSPADM

## 2023-01-10 RX ORDER — LIDOCAINE 40 MG/G
CREAM TOPICAL
Status: DISCONTINUED | OUTPATIENT
Start: 2023-01-10 | End: 2023-01-10

## 2023-01-10 RX ORDER — MISOPROSTOL 200 UG/1
400 TABLET ORAL
Status: DISCONTINUED | OUTPATIENT
Start: 2023-01-10 | End: 2023-01-10 | Stop reason: HOSPADM

## 2023-01-10 RX ORDER — AZITHROMYCIN 500 MG/5ML
500 INJECTION, POWDER, LYOPHILIZED, FOR SOLUTION INTRAVENOUS
Status: COMPLETED | OUTPATIENT
Start: 2023-01-10 | End: 2023-01-10

## 2023-01-10 RX ORDER — KETOROLAC TROMETHAMINE 30 MG/ML
INJECTION, SOLUTION INTRAMUSCULAR; INTRAVENOUS PRN
Status: DISCONTINUED | OUTPATIENT
Start: 2023-01-10 | End: 2023-01-10

## 2023-01-10 RX ORDER — OXYTOCIN 10 [USP'U]/ML
10 INJECTION, SOLUTION INTRAMUSCULAR; INTRAVENOUS
Status: DISCONTINUED | OUTPATIENT
Start: 2023-01-10 | End: 2023-01-10 | Stop reason: HOSPADM

## 2023-01-10 RX ORDER — MISOPROSTOL 200 UG/1
800 TABLET ORAL
Status: DISCONTINUED | OUTPATIENT
Start: 2023-01-10 | End: 2023-01-12 | Stop reason: HOSPADM

## 2023-01-10 RX ORDER — METOCLOPRAMIDE 10 MG/1
10 TABLET ORAL EVERY 6 HOURS PRN
Status: DISCONTINUED | OUTPATIENT
Start: 2023-01-10 | End: 2023-01-12 | Stop reason: HOSPADM

## 2023-01-10 RX ORDER — MISOPROSTOL 200 UG/1
800 TABLET ORAL
Status: DISCONTINUED | OUTPATIENT
Start: 2023-01-10 | End: 2023-01-10 | Stop reason: HOSPADM

## 2023-01-10 RX ORDER — PENICILLIN G POTASSIUM 5000000 [IU]/1
5 INJECTION, POWDER, FOR SOLUTION INTRAMUSCULAR; INTRAVENOUS ONCE
Status: COMPLETED | OUTPATIENT
Start: 2023-01-10 | End: 2023-01-10

## 2023-01-10 RX ORDER — SODIUM CHLORIDE 9 MG/ML
INJECTION, SOLUTION INTRAVENOUS CONTINUOUS
Status: DISCONTINUED | OUTPATIENT
Start: 2023-01-10 | End: 2023-01-10 | Stop reason: HOSPADM

## 2023-01-10 RX ORDER — OXYCODONE HYDROCHLORIDE 5 MG/1
5 TABLET ORAL EVERY 4 HOURS PRN
Status: DISCONTINUED | OUTPATIENT
Start: 2023-01-10 | End: 2023-01-12 | Stop reason: HOSPADM

## 2023-01-10 RX ORDER — MODIFIED LANOLIN
OINTMENT (GRAM) TOPICAL
Status: DISCONTINUED | OUTPATIENT
Start: 2023-01-10 | End: 2023-01-12 | Stop reason: HOSPADM

## 2023-01-10 RX ORDER — CITRIC ACID/SODIUM CITRATE 334-500MG
30 SOLUTION, ORAL ORAL
Status: DISCONTINUED | OUTPATIENT
Start: 2023-01-10 | End: 2023-01-10 | Stop reason: HOSPADM

## 2023-01-10 RX ORDER — EPHEDRINE SULFATE 50 MG/ML
5 INJECTION, SOLUTION INTRAMUSCULAR; INTRAVENOUS; SUBCUTANEOUS
Status: DISCONTINUED | OUTPATIENT
Start: 2023-01-10 | End: 2023-01-10 | Stop reason: HOSPADM

## 2023-01-10 RX ORDER — FENTANYL/ROPIVACAINE/NS/PF 2MCG/ML-.1
PLASTIC BAG, INJECTION (ML) EPIDURAL
Status: DISCONTINUED | OUTPATIENT
Start: 2023-01-10 | End: 2023-01-10 | Stop reason: HOSPADM

## 2023-01-10 RX ORDER — FENTANYL CITRATE-0.9 % NACL/PF 10 MCG/ML
100 PLASTIC BAG, INJECTION (ML) INTRAVENOUS EVERY 5 MIN PRN
Status: DISCONTINUED | OUTPATIENT
Start: 2023-01-10 | End: 2023-01-10 | Stop reason: HOSPADM

## 2023-01-10 RX ORDER — BISACODYL 10 MG
10 SUPPOSITORY, RECTAL RECTAL DAILY PRN
Status: DISCONTINUED | OUTPATIENT
Start: 2023-01-12 | End: 2023-01-12 | Stop reason: HOSPADM

## 2023-01-10 RX ORDER — FENTANYL CITRATE 50 UG/ML
50 INJECTION, SOLUTION INTRAMUSCULAR; INTRAVENOUS EVERY 30 MIN PRN
Status: DISCONTINUED | OUTPATIENT
Start: 2023-01-10 | End: 2023-01-10 | Stop reason: HOSPADM

## 2023-01-10 RX ORDER — DEXAMETHASONE SODIUM PHOSPHATE 4 MG/ML
INJECTION, SOLUTION INTRA-ARTICULAR; INTRALESIONAL; INTRAMUSCULAR; INTRAVENOUS; SOFT TISSUE PRN
Status: DISCONTINUED | OUTPATIENT
Start: 2023-01-10 | End: 2023-01-10

## 2023-01-10 RX ORDER — SODIUM CHLORIDE, SODIUM LACTATE, POTASSIUM CHLORIDE, CALCIUM CHLORIDE 600; 310; 30; 20 MG/100ML; MG/100ML; MG/100ML; MG/100ML
INJECTION, SOLUTION INTRAVENOUS CONTINUOUS PRN
Status: DISCONTINUED | OUTPATIENT
Start: 2023-01-10 | End: 2023-01-10 | Stop reason: HOSPADM

## 2023-01-10 RX ORDER — OXYTOCIN/0.9 % SODIUM CHLORIDE 30/500 ML
1-24 PLASTIC BAG, INJECTION (ML) INTRAVENOUS CONTINUOUS
Status: DISCONTINUED | OUTPATIENT
Start: 2023-01-10 | End: 2023-01-10 | Stop reason: HOSPADM

## 2023-01-10 RX ORDER — ONDANSETRON 2 MG/ML
4 INJECTION INTRAMUSCULAR; INTRAVENOUS EVERY 6 HOURS PRN
Status: DISCONTINUED | OUTPATIENT
Start: 2023-01-10 | End: 2023-01-10 | Stop reason: HOSPADM

## 2023-01-10 RX ORDER — ACETAMINOPHEN 325 MG/1
650 TABLET ORAL EVERY 4 HOURS PRN
Status: DISCONTINUED | OUTPATIENT
Start: 2023-01-10 | End: 2023-01-12 | Stop reason: HOSPADM

## 2023-01-10 RX ORDER — OXYTOCIN/0.9 % SODIUM CHLORIDE 30/500 ML
340 PLASTIC BAG, INJECTION (ML) INTRAVENOUS CONTINUOUS PRN
Status: DISCONTINUED | OUTPATIENT
Start: 2023-01-10 | End: 2023-01-10 | Stop reason: HOSPADM

## 2023-01-10 RX ORDER — PROCHLORPERAZINE MALEATE 10 MG
10 TABLET ORAL EVERY 6 HOURS PRN
Status: DISCONTINUED | OUTPATIENT
Start: 2023-01-10 | End: 2023-01-12 | Stop reason: HOSPADM

## 2023-01-10 RX ORDER — METHYLERGONOVINE MALEATE 0.2 MG/ML
200 INJECTION INTRAVENOUS
Status: DISCONTINUED | OUTPATIENT
Start: 2023-01-10 | End: 2023-01-12 | Stop reason: HOSPADM

## 2023-01-10 RX ORDER — FENTANYL CITRATE 50 UG/ML
100 INJECTION, SOLUTION INTRAMUSCULAR; INTRAVENOUS ONCE
Status: COMPLETED | OUTPATIENT
Start: 2023-01-10 | End: 2023-01-10

## 2023-01-10 RX ADMIN — KETOROLAC TROMETHAMINE 30 MG: 30 INJECTION, SOLUTION INTRAMUSCULAR at 23:06

## 2023-01-10 RX ADMIN — SODIUM CHLORIDE 250 ML: 9 INJECTION, SOLUTION INTRAVENOUS at 19:37

## 2023-01-10 RX ADMIN — MISOPROSTOL 25 MCG: 100 TABLET ORAL at 04:01

## 2023-01-10 RX ADMIN — Medication 500 MG: at 22:31

## 2023-01-10 RX ADMIN — LIDOCAINE HYDROCHLORIDE,EPINEPHRINE BITARTRATE 8 ML: 20; .005 INJECTION, SOLUTION EPIDURAL; INFILTRATION; INTRACAUDAL; PERINEURAL at 22:24

## 2023-01-10 RX ADMIN — PENICILLIN G POTASSIUM 5 MILLION UNITS: 5000000 POWDER, FOR SOLUTION INTRAMUSCULAR; INTRAPLEURAL; INTRATHECAL; INTRAVENOUS at 15:17

## 2023-01-10 RX ADMIN — LIDOCAINE HYDROCHLORIDE,EPINEPHRINE BITARTRATE 3 ML: 15; .005 INJECTION, SOLUTION EPIDURAL; INFILTRATION; INTRACAUDAL; PERINEURAL at 15:51

## 2023-01-10 RX ADMIN — SODIUM CHLORIDE, SODIUM LACTATE, POTASSIUM CHLORIDE, CALCIUM CHLORIDE AND DEXTROSE MONOHYDRATE: 5; 600; 310; 30; 20 INJECTION, SOLUTION INTRAVENOUS at 23:57

## 2023-01-10 RX ADMIN — MISOPROSTOL 25 MCG: 100 TABLET ORAL at 06:07

## 2023-01-10 RX ADMIN — ACETAMINOPHEN 650 MG: 325 TABLET ORAL at 21:46

## 2023-01-10 RX ADMIN — PHENYLEPHRINE HYDROCHLORIDE 0.3 MCG/KG/MIN: 10 INJECTION INTRAVENOUS at 22:43

## 2023-01-10 RX ADMIN — MISOPROSTOL 25 MCG: 100 TABLET ORAL at 12:03

## 2023-01-10 RX ADMIN — MISOPROSTOL 25 MCG: 100 TABLET ORAL at 08:04

## 2023-01-10 RX ADMIN — Medication 600 ML/HR: at 22:39

## 2023-01-10 RX ADMIN — PENICILLIN G 3 MILLION UNITS: 3000000 INJECTION, SOLUTION INTRAVENOUS at 19:57

## 2023-01-10 RX ADMIN — SODIUM CHLORIDE, POTASSIUM CHLORIDE, SODIUM LACTATE AND CALCIUM CHLORIDE: 600; 310; 30; 20 INJECTION, SOLUTION INTRAVENOUS at 18:02

## 2023-01-10 RX ADMIN — SODIUM CHLORIDE, POTASSIUM CHLORIDE, SODIUM LACTATE AND CALCIUM CHLORIDE 1000 ML: 600; 310; 30; 20 INJECTION, SOLUTION INTRAVENOUS at 15:00

## 2023-01-10 RX ADMIN — MISOPROSTOL 25 MCG: 100 TABLET ORAL at 10:06

## 2023-01-10 RX ADMIN — Medication: at 15:52

## 2023-01-10 RX ADMIN — CEFAZOLIN SODIUM 2 G: 2 INJECTION, SOLUTION INTRAVENOUS at 22:28

## 2023-01-10 RX ADMIN — VALACYCLOVIR HYDROCHLORIDE 500 MG: 500 TABLET, FILM COATED ORAL at 08:04

## 2023-01-10 RX ADMIN — ONDANSETRON 4 MG: 2 INJECTION INTRAMUSCULAR; INTRAVENOUS at 22:42

## 2023-01-10 RX ADMIN — FENTANYL CITRATE 100 MCG: 50 INJECTION INTRAMUSCULAR; INTRAVENOUS at 15:11

## 2023-01-10 RX ADMIN — PHENYLEPHRINE HYDROCHLORIDE 100 MCG: 10 INJECTION INTRAVENOUS at 22:43

## 2023-01-10 RX ADMIN — LIDOCAINE HYDROCHLORIDE,EPINEPHRINE BITARTRATE 2 ML: 15; .005 INJECTION, SOLUTION EPIDURAL; INFILTRATION; INTRACAUDAL; PERINEURAL at 15:53

## 2023-01-10 RX ADMIN — DEXAMETHASONE SODIUM PHOSPHATE 4 MG: 4 INJECTION, SOLUTION INTRA-ARTICULAR; INTRALESIONAL; INTRAMUSCULAR; INTRAVENOUS; SOFT TISSUE at 22:42

## 2023-01-10 RX ADMIN — VALACYCLOVIR HYDROCHLORIDE 500 MG: 500 TABLET, FILM COATED ORAL at 19:59

## 2023-01-10 RX ADMIN — Medication 2 MG: at 22:42

## 2023-01-10 RX ADMIN — ONDANSETRON 4 MG: 2 INJECTION INTRAMUSCULAR; INTRAVENOUS at 15:15

## 2023-01-10 RX ADMIN — MISOPROSTOL 25 MCG: 100 TABLET ORAL at 02:00

## 2023-01-10 RX ADMIN — MISOPROSTOL 25 MCG: 100 TABLET ORAL at 00:03

## 2023-01-10 ASSESSMENT — ACTIVITIES OF DAILY LIVING (ADL)
ADLS_ACUITY_SCORE: 22
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
## 2023-01-10 NOTE — CONSULTS
"ACUPUNCTURIST TREATMENT NOTE    Name: Cassidy Fernandes  :  2000  MRN:  [id number]    Acupuncture Treatment  Patient Type: Maternity  Intervention Reason: Desires Experience  Patient complaint:: Induction  Initial insertions: Bl 31, 32, Li 4, Sp 6, (L) Bl 67  Number of needles inserted: 9  Number of needles removed: 9         \"Risks and benefits of acupuncture were discussed with patient. Consent for treatment was given. We thank you for the referral.\"     Mark Linn    Date:  1/10/2023  Time:  12:02 PM    "

## 2023-01-10 NOTE — PROGRESS NOTES
Provider and RN at bedside to discuss plan of care with patient. Pt visible anxious and crying related to past PTSD. Provider approved removal of fetal monitoring for pt to have a break in bath.   Midwife to discuss plan with on-call physician.

## 2023-01-10 NOTE — CONSULTS
Integrative Therapy Consult    Healing PresenceYes  Essential Oils: Topical (EO/Topical Oil)     Labor Massage Oil - HC       Healing Music:       Breathwork:       Guided Imagery:       Acupressure:       Oshibori:       Energy Therapy:       Healing Touch:       Reiki:       Qi Gong:     Massage: Foot, Targeted massage      Targeted Massage: Legs  Sleep Promotion:       Other Therapy:       Intervention Reason: Labor     Pre and Post Session Scores: Patient Desires Treatment: yes                             Delivery:         Referrals:      Adele Juarez

## 2023-01-10 NOTE — ANESTHESIA PROCEDURE NOTES
Epidural catheter Procedure Note    Pre-Procedure   Staff -        Anesthesiologist:  Dayton Mae MD       Performed By: anesthesiologist       Location: OB       Procedure Start/Stop Times: 1/10/2023 3:40 PM and 1/10/2023 3:53 PM       Pre-Anesthestic Checklist: patient identified, IV checked, risks and benefits discussed, informed consent, monitors and equipment checked, pre-op evaluation, at physician/surgeon's request and post-op pain management  Timeout:       Correct Patient: Yes        Correct Procedure: Yes        Correct Site: Yes        Correct Position: Yes   Procedure Documentation  Procedure: epidural catheter       Patient Position: sitting       Patient Prep/Sterile Barriers: sterile gloves, mask       Skin prep: Chloraprep       Local skin infiltrated with 3 mL of 1% lidocaine.        Insertion Site: L3-4. (midline approach).       Technique: LORT air        Needle type: Lamoda.       Needle Gauge: 18.        Needle Length (Inches): 3.5        Catheter: 20 G.          Catheter threaded easily.         5 cm epidural space.         Threaded 12 cm at skin.         # of attempts: 1 and  # of redirects:     Assessment/Narrative         Paresthesias: No.       Test dose of 3 mL lidocaine 1.5% w/ 1:200,000 epinephrine at.         Test dose negative, 3 minutes after injection, for signs of intravascular, subdural, or intrathecal injection.       Insertion/Infusion Method: LORT air       No aspiration negative for Heme or CSF via Epidural Catheter.    Medication(s) Administered   Medication Administration Time: 1/10/2023 3:40 PM     Comments:  Single pass epidural at L3-L4, crisp loss of resistance at 7 cm. Catheter threaded easily 5 cm into epidural space. Negative aspiration for heme and CSF. No pain or paresthesias upon epidural placement. Prior to leaving room, patient was resting comfortably through contractions.       FOR [facility] ([facility]) ONLY:   Pain Team Contact information: please page  "the Pain Team Via [facility]. Search \"Pain\". During daytime hours, please page the attending first. At night please page the resident first.    "
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
## 2023-01-10 NOTE — PROGRESS NOTES
0715 - Assumed care of 21 yo, , 38+3WGA, admitted on 23 for cervical ripening and IOL for pre-eclampsia, PCR 5.1, without any further symptoms.  Mild range BP's, DTR's normal, denies any other neurological symptoms.  Resting in bed quietly with wireless monitoring device providing Cat 1 tracing. /has received 7 doses of oral cytotec. Pregnancy remarkable for Covid recovered (22), GBS positive, hx of genital herpes & depression.  FOB at bedside for support.  Having a boy, desires to breast feed, unsure of peds, no circ and est LGA.
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
## 2023-01-10 NOTE — ANESTHESIA PREPROCEDURE EVALUATION
Anesthesia Pre-Procedure Evaluation    Patient: Cassidy Fernandes   MRN: [id number] : 2000        Procedure :           Past Medical History:   Diagnosis Date     Contact dermatitis and other eczema, due to unspecified cause      Depressive disorder      Herpes simplex virus (HSV) infection      Migraines      Mononucleosis     with hospitalization     Uncomplicated asthma       History reviewed. No pertinent surgical history.   Allergies   Allergen Reactions     Seasonal Allergies       Social History     Tobacco Use     Smoking status: Never     Passive exposure: Yes     Smokeless tobacco: Never   Substance Use Topics     Alcohol use: Not Currently      Wt Readings from Last 1 Encounters:   23 105.2 kg (232 lb)        Anesthesia Evaluation   Pt has had prior anesthetic.     No history of anesthetic complications       ROS/MED HX  ENT/Pulmonary:  - neg pulmonary ROS     Neurologic:  - neg neurologic ROS     Cardiovascular:     (+) --PIH ---    METS/Exercise Tolerance:     Hematologic:  - neg hematologic  ROS     Musculoskeletal:       GI/Hepatic:  - neg GI/hepatic ROS     Renal/Genitourinary:       Endo:     (+) Obesity,     Psychiatric/Substance Use:     (+) psychiatric history anxiety and depression     Infectious Disease:       Malignancy:       Other:            Physical Exam    Airway        Mallampati: III   TM distance: > 3 FB   Neck ROM: full   Mouth opening: > 3 cm    Respiratory Devices and Support         Dental  no notable dental history         Cardiovascular   cardiovascular exam normal          Pulmonary   pulmonary exam normal                OUTSIDE LABS:  CBC:   Lab Results   Component Value Date    WBC 17.8 (H) 01/10/2023    WBC 16.7 (H) 2023    HGB 13.2 01/10/2023    HGB 12.3 2023    HCT 38.9 01/10/2023    HCT 36.3 2023     01/10/2023     2023     BMP:   Lab Results   Component Value Date     07/10/2019     2017    POTASSIUM  4.4 07/10/2019    POTASSIUM 3.7 12/14/2017    CHLORIDE 106 07/10/2019    CHLORIDE 106 12/14/2017    CO2 30 07/10/2019    CO2 25 12/14/2017    BUN 11 07/10/2019    BUN 11 12/14/2017    CR 0.66 01/10/2023    CR 0.62 01/08/2023    GLC 76 07/10/2019     (H) 12/14/2017     COAGS:   Lab Results   Component Value Date    PTT 24 01/10/2023    INR 1.08 01/10/2023     POC:   Lab Results   Component Value Date    HCG Positive (A) 05/19/2022    HCGS Negative 12/14/2017     HEPATIC:   Lab Results   Component Value Date    ALBUMIN 4.3 07/10/2019    PROTTOTAL 7.3 07/10/2019    ALT 32 01/10/2023    AST 26 01/10/2023    ALKPHOS 92 07/10/2019    BILITOTAL 0.5 07/10/2019     OTHER:   Lab Results   Component Value Date    A1C 5.4 03/08/2022    FORREST 9.4 07/10/2019    PHOS 3.9 12/14/2017    MAG 1.9 12/14/2017    TSH 1.89 03/08/2022       Anesthesia Plan    ASA Status:  2, emergent       Anesthesia Type: Epidural.              Consents    Anesthesia Plan(s) and associated risks, benefits, and realistic alternatives discussed. Questions answered and patient/representative(s) expressed understanding.    - Discussed:     - Discussed with:  Patient         Postoperative Care            Comments:    Other Comments: Patient requests labor epidural.  Chart reviewed, including labs.  I reviewed the options and risks with the patient, including but not limited to: bleeding, infection, damage to tissues under the skin (nerves, muscles, bloodvessels), hypotension, headache, and epidural failure.  The patient's questions were answered and the consent was signed. The patient agrees to elective epidural placement.      Addendum: Pt planned to go to urgent C/S for recurring fetal decelerations. Epidural working well plan to bolus with GETA as back up.        neg OB ROS.       JULIO CESAR RUFFIN MD

## 2023-01-10 NOTE — PROGRESS NOTES
Patient comfortable with epidural in place. Received 100 mcg IV Fentanyl prior to epidural and 4mg IV Zofran. FHR 150s with LTV 2-20 bpm. From 8615-9170 FHR variables noted with some asa low as 80-90 and others down to 120. Contractions by EFM Q2-4 min. Position changed form left side to right side lying and variables resolved. Encouraged to try and rest/sleep. FOB and his family at bedside now for support.Plan to start low dose Pitocin per protocol for augmentation as needed.

## 2023-01-10 NOTE — PROGRESS NOTES
Uterine activity acquired via wireless Rozina.  Writer notes tracing appearance looks hyperstimulated. Pt comfortable, states she feels some cramping.  Discussed pt status with BERKLEY Franks.  Plan to recheck labs and PCR and reassess pt's continued cervical ripening.

## 2023-01-10 NOTE — PROGRESS NOTES
"Labor Progress Note:    Patient Name:  Cassidy Fernandes  :      2000  MRN:      [id number]    Assessment:    22yr old  at 38w3d gestation  Gestational hypertension without severe features  Repeat labs WDL  IOL in progress  Early active labor with epidural in place  Category II tracing- FHR variables resolve with position change  SROM clear fluid  GBS +, IV antibiotics per protocol 1st dose admin at 1517    Plan:   -IV Pitocin per low dose protocol for augmentation of labor with stable FHTs  -Encourage rest with epidural in place  -FSE placed  -Routine CNM support & management.   -Anticipate progress and NSVB.   -Reevaluate progress in 2-3 hours or sooner with a change in status.    Subjective:  Cassidy Fernandes is coping well and not feeling contractions with epidural in place. Good PO fluid intake.   Zuniga inserted and draining clear yellow urine. Family and FOB here for support.    Objective:  /57   Pulse 112   Temp 98.1  F (36.7  C) (Oral)   Resp 16   Ht 1.651 m (5' 5\")   Wt 105.2 kg (232 lb)   LMP 2022 (Exact Date)   SpO2 96%   BMI 38.61 kg/m    FHR: 150 LTV 2-10 bpm, FHR variables with contractions myra of 80 and 95 occurred after SROM and cervical exam   Uterine contractions: Q 4-5 min  SVE: 3-4 cm/80%/-1/soft/mid  Leaking clear fluid, SROM with exam      TT with patient 20mn >50% time spent in counseling    Provider:  Lucinda Franks DNP,APRN,CNM    Date:  1/10/2023  Time:  5:20 PM      "

## 2023-01-11 LAB — HGB BLD-MCNC: 10.3 G/DL (ref 11.7–15.7)

## 2023-01-11 PROCEDURE — 85018 HEMOGLOBIN: CPT | Performed by: OBSTETRICS & GYNECOLOGY

## 2023-01-11 PROCEDURE — 250N000013 HC RX MED GY IP 250 OP 250 PS 637: Performed by: ADVANCED PRACTICE MIDWIFE

## 2023-01-11 PROCEDURE — 250N000011 HC RX IP 250 OP 636: Performed by: OBSTETRICS & GYNECOLOGY

## 2023-01-11 PROCEDURE — 999N000249 HC STATISTIC C-SECTION ON UNIT

## 2023-01-11 PROCEDURE — 250N000013 HC RX MED GY IP 250 OP 250 PS 637: Performed by: OBSTETRICS & GYNECOLOGY

## 2023-01-11 PROCEDURE — 36415 COLL VENOUS BLD VENIPUNCTURE: CPT | Performed by: OBSTETRICS & GYNECOLOGY

## 2023-01-11 PROCEDURE — 120N000001 HC R&B MED SURG/OB

## 2023-01-11 RX ADMIN — ACETAMINOPHEN 975 MG: 325 TABLET ORAL at 21:20

## 2023-01-11 RX ADMIN — VALACYCLOVIR HYDROCHLORIDE 500 MG: 500 TABLET, FILM COATED ORAL at 20:15

## 2023-01-11 RX ADMIN — KETOROLAC TROMETHAMINE 30 MG: 30 INJECTION, SOLUTION INTRAMUSCULAR; INTRAVENOUS at 05:06

## 2023-01-11 RX ADMIN — ACETAMINOPHEN 650 MG: 325 TABLET ORAL at 14:40

## 2023-01-11 RX ADMIN — KETOROLAC TROMETHAMINE 30 MG: 30 INJECTION, SOLUTION INTRAMUSCULAR; INTRAVENOUS at 11:32

## 2023-01-11 RX ADMIN — ACETAMINOPHEN 975 MG: 325 TABLET ORAL at 08:40

## 2023-01-11 RX ADMIN — VALACYCLOVIR HYDROCHLORIDE 500 MG: 500 TABLET, FILM COATED ORAL at 08:40

## 2023-01-11 RX ADMIN — OXYCODONE HYDROCHLORIDE 5 MG: 5 TABLET ORAL at 08:45

## 2023-01-11 RX ADMIN — IBUPROFEN 800 MG: 800 TABLET ORAL at 18:22

## 2023-01-11 RX ADMIN — SENNOSIDES AND DOCUSATE SODIUM 1 TABLET: 50; 8.6 TABLET ORAL at 21:20

## 2023-01-11 RX ADMIN — ACETAMINOPHEN 975 MG: 325 TABLET ORAL at 03:05

## 2023-01-11 RX ADMIN — OXYCODONE HYDROCHLORIDE 5 MG: 5 TABLET ORAL at 21:20

## 2023-01-11 RX ADMIN — SENNOSIDES AND DOCUSATE SODIUM 2 TABLET: 50; 8.6 TABLET ORAL at 08:41

## 2023-01-11 ASSESSMENT — ACTIVITIES OF DAILY LIVING (ADL)
ADLS_ACUITY_SCORE: 22

## 2023-01-11 NOTE — PROGRESS NOTES
"Csection - Post operative day 1    ASSESSMENT: PLAN:   POD#1    Primary csection due to  fetal intolerance to labor  Doing well  Continue routine cares   aniticipate discharge in am    SUBJECTIVE:    The patient feels well: Catheter is out, bleeding decreased, tolerating normal diet, and passing flatus.  Pain is well controlled. The patient has no emotional concerns.  The baby is well and being fed    OBJECTIVE:  /65   Pulse 98   Temp 97.8  F (36.6  C) (Oral)   Resp 16   Ht 1.651 m (5' 5\")   Wt 105.2 kg (232 lb)   LMP 03/17/2022 (Exact Date)   SpO2 98%   Breastfeeding Unknown   BMI 38.61 kg/m      Fundus firm  Incision- dressing dry   Ext- nontender      Lab  Hemoglobin   Date Value Ref Range Status   01/11/2023 10.3 (L) 11.7 - 15.7 g/dL Final   07/10/2019 13.5 11.7 - 15.7 g/dL Final   ]        Brandi Cortez MD  [facility]  [phone]    "

## 2023-01-11 NOTE — PROGRESS NOTES
"Labor Progress Note:    Patient Name:  Cassidy Fernandes  :      2000  MRN:      [id number]      Assessment:     at 38w3d  Early active labor, epidural  Preeclampsia without severe features  Cat II FHTs, currently with moderate variability    Plan:   -Continue to monitor FHR closely.  Begin IV pitocin augmentation if FHR improves  -Routine CNM support & management  -Anticipate progress and NSVB.    Subjective:  Cassidy Fernandes is comfortable.  Not feeling contractions.  Agreeable to VE and placement of IUPC to measure contraction strength      Objective:  BP (!) 148/71   Pulse 112   Temp 98.8  F (37.1  C) (Oral)   Resp 16   Ht 1.651 m (5' 5\")   Wt 105.2 kg (232 lb)   LMP 2022 (Exact Date)   SpO2 97%   BMI 38.61 kg/m      FHR: 135bpm, periods of minimal variability but currently moderate, + accel during VE, intermittent variable decels    Uterine contractions: Every 2-6 minutes, palpate mod    SVE:  4-5/90/-1.  IUPC placed without difficulty      Provider:  MARY Peralta, BERKLEY    Date:  1/10/2023  Time:  7:01 PM    Total time spent with patient:20\", >50% time spent counseling and coordination of care.      "

## 2023-01-11 NOTE — ANESTHESIA POSTPROCEDURE EVALUATION
Patient: Cassidy Fernandes    Procedure: * No procedures listed *       Anesthesia Type:  Epidural    Note:     Postop Pain Control:    PONV:    Neuro/Psych:    Airway/Respiratory:    CV/Hemodynamics:    Other NRE:    DID A NON-ROUTINE EVENT OCCUR?     Event details/Postop Comments:  Pain well controlled. Denies headache, nausea/vomiting. Pt ambulating with no issues. Zuniga catheter just removed, has yet to void. Instructed to f/u with anesthesia if issues. Motor function returned to baseline. No apparent anesthesia related complications. All questions and concerns answered.                     Last vitals:  Vitals:    01/11/23 0511 01/11/23 0556 01/11/23 0827   BP: 106/66 116/65 129/81   Pulse:  98 98   Resp:  16 16   Temp:  36.6  C (97.8  F) 37.1  C (98.8  F)   SpO2:  98%        Electronically Signed By: Venita Hull MD  January 11, 2023  9:03 AM  
hip

## 2023-01-11 NOTE — PROGRESS NOTES
"Having recurrent decels to 70bpm with contractions.  IV pitocin unable to be started.  Moderate variability present.  A: Cat II FHR tracing remote from delivery  P: Consult with Dr. Cortez.  Recommends C/S.  Preparations being made.  Transfer of care to OB management.  MARY Peralta, CNM  TT with patient 10\" with >50% spent on counseling/coordination of care.    "
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
## 2023-01-11 NOTE — PROGRESS NOTES
"Labor Progress Note:    Patient Name:  Cassidy Fernandes  :      2000  MRN:      [id number]      Assessment:     at 38w3d  Early active labor, no progress x2 hours, epidural, inadequate contractions  Cat I FHTs currently  Preeclampsia without severe features    Plan:   -IV pitocin augmentation  -Continue to monitor FHR status  -Routine CNM support & management  -Anticipate progress and NSVB.    Subjective:  Cassidy Fernandes remains comfortable.  Has been able to rest some.  FOB remains at bedside.    Objective:  /77   Pulse 112   Temp 98.7  F (37.1  C) (Oral)   Resp 16   Ht 1.651 m (5' 5\")   Wt 105.2 kg (232 lb)   LMP 2022 (Exact Date)   SpO2 97%   BMI 38.61 kg/m      FHR: 135bpm, moderate variability, intermittent early decels, + accel during VE    Uterine contractions: Every 4-6 minutes, palpate mod    SVE:  5/90/-1, caput present      Provider:  MARY Peralta, CNM    Date:  1/10/2023  Time:  9:09 PM    Total time spent with patient:20\", >50% time spent counseling and coordination of care.      "

## 2023-01-11 NOTE — PROGRESS NOTES
0715 - Assumed care of POD #1, primary C/S for fetal intolerance to labor.  Emmily resting quietly in bed, infant quiet in bassinet at bedside.  Left undisturbed.    0830 - Pt easily aroused from sleep for assessment and dialogue.  VSS.  Verbalizes mod pain in back, denies h/a.  Min assist up to bathroom for AM cares.  Stable gait and good movement.  Zuniga catheter d/c'ed. Pericare taught and performed.  PP room cleaned and linens provided.      0845 - Oxycodone 5mg provided.  Assisted with infant breastfeeding attempt, good infant latch and good comfort in maternal handling of infant. All questions and concerns addressed.

## 2023-01-11 NOTE — PROGRESS NOTES
Delivery team called, RT present. No intervention from RT and was later dismissed by NNP.    Delmy Hankins, RT

## 2023-01-11 NOTE — PROGRESS NOTES
"Deep recurrent variables with contractions since placement of IUPC.  Will start amnioinfusion.  Discussed fetal status with patient and family.  Unable to start pitocin augmentation at this time due to fetal status.  If no improvement in fetal status, or if deteriorates further will consult OB.  Dilma Valencia APRN, CNM  TT with patient 10\" with >50% spent on counseling/coordination of care.    "
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
## 2023-01-11 NOTE — L&D DELIVERY NOTE
OB  Delivery Note      Cassidy Fernandes MRN# [id number]   Age: 22 year old YOB: 2000     2  GA: 38w3d  GP:   Labor Complications: Fetal Intolerance;Dysfunctional Labor , preeclampsia   EBL:   mL  Delivery QBL:    Delivery Type: , Low Transverse   ROM to Delivery Time: (Delivered) Hours: 5 Minutes: 23     1 Minute 5 Minute 10 Minute   Apgar Totals: 8   9        Personel Present: TEJINDER URRUTIA      Details    Pre-Op Diagnosis: 1. Intrauterine pregnancy at 38w3d  2. Induction of labor   3. preeclapmsia   Post-Op Diagnosis: 1. Failed induction of labor  2. Category 2 tracing remote form delivery  3. Dysfunctional labor     Indications:      Procedure:   , Low Transverse  via   incision   Anesthesia: Epidural      Informed Consent:  The risks, benefits, complications, and alternatives were discussed with the patient. The patient understood that the risks of  section include, but are not limited to: injury to nearby structures or organs, infection, blood loss and possible need for transfusion, and potential need for more surgery including hysterectomy. The patient stated understanding and desired to proceed. All questions were answered. The site of surgery was properly noted and marked. The patient was identified as Cassidy Fernandes and the procedure verified as a  delivery. A Time Out was held and the above information confirmed.    Procedure Details:  The patient was taken to the operating room where Epidural  anesthesia was found to be adequate. Antibiotics were given for infection prophylaxis. She was prepped and draped in the normal sterile fashion in the dorsal supine position with leftward tilt. A Pfannenstiel skin incision was made with scalpel. The incision was carried down to the fascia with bovie cauterization. The fascia was incised and extended laterally with Caldwell scissors. The inferior aspect of the fascia was grasped with  Kocher clamps. The underlying rectus and pyramidalis muscles were dissected off with Caldwell scissors. In a similar fashion, the superior aspect of the fascia was elevated with Kocher clamps, and the rectus muscle was dissected off. The rectus muscles were  bluntly down the midline down to the level of the pubic symphysis. The peritoneum was identified and entered bluntly. Peritoneal incision was extended superiorly and inferiorly with good visualization of the bladder.    The bladder blade was inserted, vesicouterine peritoneum was identified, and bladder flap created sharply. The lower uterine segment was then incised with a   incision and was extended sharply. The amniotic sac was ruptured and Clear  color noted. The bladder blade was removed and the infant was delivered atraumatically using the usual maneuvers. The remainder of the infant was delivered, the cord clamped and cut, and the baby was handed off to the awaiting clinicians.     The placenta was removed via manual massage. The uterus was then exteriorized and cleared of all clots and debris. The hysterotomy was repaired with suture of 0 Vicryl in a running locked fashion. A second imbricating layer of the same suture was placed and good hemostasis observed. The ovaries and tubes appeared normal bilaterally. The uterus, tubes, and ovaries were then returned to the abdomen and pelvis. The uterine incision was reinspected and found to be hemostatic. The subfascial spaces were inspected and noted to be hemostatic. The fascia was then reapproximated with  running sutures of 0 PDS tied at the midline. The skin was closed with 3-0 Vicryl sutures.    The patient tolerated the procedure well. Sponge, instrument, and needle counts were correct and the patient was taken to the recovery room in good and stable condition.       Oscar Fernandes [[id number]]    Labor Events     labor?: No   steroids: None  Labor Type: Induction/Cervical  ripening  Predominate monitoring during 1st stage: continuous electronic fetal monitoring     Antibiotics received during labor?: Yes  Reason for Antibiotics: GBS  Antibiotics received for GBS: Penicillin  Antibiotics Given (GBS): Less than or equal to 4 hours prior to delivery     Rupture date/time: 1/10/23 1715   Rupture type: Spontaneous rupture of membranes occuring during spontaneous labor or augmentation  Fluid color: Clear  Fluid odor: Normal     Induction: Cervidil, Misoprostol  Induction date/time:     Cervical ripening date/time:     Indications for induction: Hypertension     Augmentation: Oxytocin  Indications for augmentation: Hypertension  1:1 continuous labor support provided by?: RN       Delivery/Placenta Date and Time    Delivery Date: 1/10/23 Delivery Time: 10:38 PM   Placenta Date/Time: 1/10/2023 10:40 PM   Other personnel present at delivery:  Provider Role   Brandi Cortez MD Obstetrician         Apgars    Living status: Living   1 Minute 5 Minute 10 Minute 15 Minute 20 Minute   Skin color: 0  1       Heart rate: 2  2       Reflex irritability: 2  2       Muscle tone: 2  2       Respiratory effort: 2  2       Total: 8  9          Cord    Cord Complications: Nuchal   Nuchal Intervention: clamped and cut         Nuchal cord description: loose nuchal cord         Stem cell collection?: No       Pana Resuscitation    Methods: None  Pana Care at Delivery: LARRY Delivery Note    Asked by Dr. Cortez to attend the delivery of this term, male infant with a gestational age of 38 3/7 weeks secondary to fetal heart rate decelerations.      Infant delivered at 2238 hours on 1/10/2023. Infant had spontaneous respirations at birth. Delayed cord clamping was performed for 1 minute.  He was placed on a warmer, dried, stimulated, and bulb suctioned at birth. Apgars were 8 at one minute and 9 at five minutes of age. Gross PE is WNL. Infant will remain with mom for routine  care.     Meg Cuenca  BALTAZAR 1/10/2023 10:54 PM   Advanced Practice Providers  [facility]        Measurements    Weight: 8 lb 11.7 oz       Labor Events and Shoulder Dystocia    Fetal Tracing Prior to Delivery: Category 2  Shoulder dystocia present?: Neg     Delivery (Maternal) (Provider to Complete) ([id number])    Episiotomy: None  Perineal lacerations: None    Repair suture: None  Genital tract inspection done: Pos     Blood Loss  Mother: Cassidy Fernandes #[id number]   Start of Mother's Information    Delivery Blood Loss  01/10/23 2235 - 01/10/23 2315    Total Surgical QBL Blood Loss (mL) Hospital Encounter 659 mL    Total  659 mL         End of Mother's Information  Mother: Cassidy Fernandes #[id number]          Delivery - Provider to Complete (082055)    CNM Care: Exclusive CNM care in labor  Delivery Type (Choose the 1 that will go to the Birth History): , Low Transverse                    Priority: Urgent    Specifics: Primary nulliparius   Other personnel:  Provider Role   Brandi Cortez MD Obstetrician                Placenta    Date/Time: 1/10/2023 10:40 PM  Removal: Manual Removal  Comments: x2  Disposition: Hospital disposal           Anesthesia    Method: Epidural  Cervical dilation at placement: 4-7                Presentation and Position    Presentation: Vertex    Position: Left Occiput Anterior                 Brandi oCrtez MD

## 2023-01-11 NOTE — PLAN OF CARE
Problem: Postpartum ( Delivery)  Goal: Successful Maternal Role Transition  Outcome: Progressing     Problem: Postpartum ( Delivery)  Goal: Hemostasis  Outcome: Progressing     Problem: Postpartum ( Delivery)  Goal: Optimal Pain Control and Function  Outcome: Progressing  Intervention: Prevent or Manage Pain  Recent Flowsheet Documentation  Taken 2023 0520 by Vera Jiménez, RN  Pain Management Interventions: cold applied  Taken 2023 0506 by Vera Jiménez RN  Pain Management Interventions:   medication (see MAR)   cold applied   Patient bonding well with infant. Able to recognize infant cues and perform infant cares. Fundus remains firm at umbilicus, light bleeding and no clots noted. Pain has been labile, but under control with scheduled toradol, tylenol, and with use of ice packs. Patient complaining majority of back pain. Patient ambulating in the room with little difficulty.

## 2023-01-11 NOTE — ANESTHESIA CARE TRANSFER NOTE
Patient: Cassidy Fernandes    Procedure: * No procedures listed *       Diagnosis: * No pre-op diagnosis entered *  Diagnosis Additional Information: No value filed.    Anesthesia Type:   Epidural     Note:    Oropharynx: oropharynx clear of all foreign objects  Level of Consciousness: awake  Oxygen Supplementation: room air    Independent Airway: airway patency satisfactory and stable  Dentition: dentition unchanged  Vital Signs Stable: post-procedure vital signs reviewed and stable  Report to RN Given: handoff report given  Patient transferred to: Labor and Delivery    Handoff Report: Identifed the Patient, Identified the Reponsible Provider, Reviewed the pertinent medical history, Discussed the surgical course, Reviewed Intra-OP anesthesia mangement and issues during anesthesia, Set expectations for post-procedure period and Allowed opportunity for questions and acknowledgement of understanding      Vitals:  Vitals Value Taken Time   /59 01/10/23 2322   Temp 36.5  C (97.7  F) 01/10/23 2322   Pulse 103 01/10/23 2322   Resp 16 01/10/23 2322   SpO2 93 % 01/10/23 2322       Electronically Signed By: MARY Guajardo CRNA  January 10, 2023  11:24 PM

## 2023-01-12 VITALS
HEART RATE: 80 BPM | HEIGHT: 65 IN | BODY MASS INDEX: 38.65 KG/M2 | SYSTOLIC BLOOD PRESSURE: 109 MMHG | WEIGHT: 232 LBS | TEMPERATURE: 97.4 F | OXYGEN SATURATION: 98 % | DIASTOLIC BLOOD PRESSURE: 60 MMHG | RESPIRATION RATE: 16 BRPM

## 2023-01-12 PROCEDURE — 250N000013 HC RX MED GY IP 250 OP 250 PS 637: Performed by: OBSTETRICS & GYNECOLOGY

## 2023-01-12 RX ADMIN — IBUPROFEN 800 MG: 800 TABLET ORAL at 12:40

## 2023-01-12 RX ADMIN — ACETAMINOPHEN 975 MG: 325 TABLET ORAL at 03:53

## 2023-01-12 RX ADMIN — SENNOSIDES AND DOCUSATE SODIUM 1 TABLET: 50; 8.6 TABLET ORAL at 08:14

## 2023-01-12 RX ADMIN — OXYCODONE HYDROCHLORIDE 5 MG: 5 TABLET ORAL at 08:14

## 2023-01-12 RX ADMIN — IBUPROFEN 800 MG: 800 TABLET ORAL at 00:14

## 2023-01-12 RX ADMIN — ACETAMINOPHEN 975 MG: 325 TABLET ORAL at 10:20

## 2023-01-12 RX ADMIN — IBUPROFEN 800 MG: 800 TABLET ORAL at 06:18

## 2023-01-12 ASSESSMENT — ACTIVITIES OF DAILY LIVING (ADL)
ADLS_ACUITY_SCORE: 22

## 2023-01-12 NOTE — ADDENDUM NOTE
Addendum  created 01/12/23 1102 by Thea Murphy APRN CRNA    Intraprocedure Event edited, Intraprocedure Staff edited

## 2023-01-12 NOTE — LACTATION NOTE
This note was copied from a baby's chart.  Referred to Cassidy to assist with a feeding. She reported that she has a [employer] pump for home use. Cassidy asked to be sized for breast pump flanges and she needed a 21mm or smaller on the R and 21mm on L.  With a gloved finger, a suck assessment was done. An anterior tie was noted and pointed out to parents along with a higher palate.  A feeding was initiated in a cross cradle hold on the R after expressing colostrum on the nipple. With the nipple tipped to the roof of baby's mouth, a deep latch was obtained. Cassidy reported that the latch was more comfortable. A football hold was also demonstrated on the other breast so that Cassidy could experience more than one hold.With breast compression a few swallows were pointed out to her.  Resources for oupt lactation and ECFE were discussed for after discharge. To also try acupuncture before discharge. Cassidy doesn't qualify for home care but will have a NB check tomorrow.

## 2023-01-12 NOTE — DISCHARGE INSTRUCTIONS
New Mother Care    Thank you for sharing your birth experience with the [facility].  Congratulations on the birth of your baby!    Postpartum Appointment:  You should have your postpartum appointment at six weeks after delivery.  If you had a  birth, you should have an appointment at two weeks with the physician who performed your surgery, and six weeks with the midwives. Call [phone] to schedule this appointment.     Lactation Appointment with CNM:   If you would like to make a clinic appointment for breastfeeding support with one of the Certified Nurse-Midwives who is also an International Board Certified Lactation Consultant, please call [phone].     Postpartum Changes:  You have experienced many physical and emotional changes during your pregnancy.  After delivery, you will notice other changes.  Here are some tips for common experiences after your baby is born.      Sign/Symptom Cause Suggestions   Mood Swings Hormonal changes, stress, fatigue Rest.  Ask for help.  Contact your health care provider if sadness continues more than two weeks, or caring for baby becomes overwhelming.   Engorged Breasts Swelling with more fluid and breast milk production two to five days after delivery.  May occur whether or not you are breastfeeding. Heat or ice for comfort.  If breastfeeding, nurse frequently.  Use supportive bra without underwire.  Should improve in one to two days.   After pains/ Cramping Cramping of uterus, often with nursing which stimulates the uterus to tighten.  Stronger with subsequent babies (second or more). Use non-aspirin pain reliever (ibuprofen or acetaminophen), especially before breastfeeding.  Empty your bladder frequently (at least every 2 hours).   Increased vaginal bleeding Too much physical activity; breastfeeding (due to uterine contractions). Rest more.  Avoid use of tampons.  Redness and amount of vaginal discharge (bleeding)  "decreases by three to four weeks after delivery.  Call clinic if you fill more than one pad within two hours.   Perineal discomfort and hemorrhoids Swelling from delivery; episiotomy or laceration (tearing); stitches. Ice, non-asprin pain reliever, witch hazel pads, warm tub soaks, stool softener, high fiber diet, kegel exercises. Stitches are absorbed.  Healing in two to three weeks. Do NOT use \"doughnut\" pillow for sitting.   Increased sweating and urinating Body losing extra fluid from pregnancy; hormonal shifts. Empy bladder more often, especially before breastfeeding; increase water intake; improves within three to four days.   Constipation Change in abdominal pressure and swelling after delivery; hormonal changes. Increase fluids and fiber in diet; Metamucil or stool softner as needed.   Skin coloring  Hair Thickness  Swelling Skin darkening and pregnancy rash due to hormonal changes; extra hair growth during pregnancy; increased fluids from pregnancy and birth causes swelling. Darker skin coloring and stretch marks with fade after delivery (no treatment needed).  Extra hair will be lost in two to four months.  Pregnancy swelling resolves in one to four weeks. Continue to hydrate.   Sciatica pain Nerve irritation due to extra weight and pressure during pregnancy. May continue after delivery; heat, acetaminophen, ibuprofen, position changes for comfort.     Postpartum Exercises  These exercises may be started soon after delivery.  If you feel tired or uncomfortable, stop and try these exercises after resting.  Check for any separation in your stomach wall before doing exercises that involve twising or stress on your stomach muscles.  Place your fingers in the center of your upper abdomen and lift your head and shoulders up in a partial sit-up.  If you feel a separation in your muscle wall, it is too soon to do sit ups.  Try the following instead:  Tighten and relax your pelvic floor muscles often.  Breathe " deeply while laying on your back.  As you breathe out, lift just your head up and pull the sides of your stomach toward the middle with your hands.  Then breathe in as you put your head down.  This will help to close the separation of your stomach.  Tilt your pelvis back and forth.  Alternate this with full body stretches.  Move your feet back and forth, then in circular motions.  While lying on your back, slide your heels toward your hips and bend your knees one at a time, then together.  While lying flat on the floor, bend your knees and raise your legs one at a time.  When the stomach wall separation has closed, you can progress to straight curl-ups, diagonal curl-ups, and side leg lifts.    After a  Birth  In addition to the instruction after a vaginal delivery, follow these guidelines:  Check your incision each day for signs of infection: redness, drainage, warmth or discomfort.  Contact your midwife or OB who performed your surgery if you have any of these signs or heavy vaginal bleeding.  Check with your midwife or surgeon before taking tub baths.  You may start driving a car two weeks after delivery.  Gradually increase your physical activity and exercise level according to how comfortable or tired you feel.  During the first days after delivery, try deep breathing, bending and stretching your feet in up-and-down circular motions, extending and tightening your legs while crossed at the ankles, and doing isometric exercises while lying down.  Next, try pelvic lifts with bent knees, bending and straightening your knees separately and together.  After checking for the abdominal rectus (stomach wall) separation, advance to back arching, twisting to each side, and reaching for your knees with pillow support.  Straight curl-ups, diagonal curl-ups and side leg lifts can then be added.    Reminders  Healthy nutrition is still important for your recovery and breastfeeding.  Continue your prenatal vitamins if  you are breastfeeding.  It is important for your health and your baby's health to stay smoke-free.  Babies exposed to smoke are sick more often.  Family planning is important.  Discuss birth control options with your provider.    Warning Signs  Call the on-call midwife if you have:  Heavy bleeding (filling one pad within one to two hours).  Blood clots larger than the size of a golf ball, especially with heavy bleeding.  Vaginal discharge with foul odor or green color.  Fever (oral temperature over 100.3 F).  Signs of bladder infection (burning, frequent urination)  Signs of vaginal infection (vaginal itching, irritation)  Painful, hard lump in breast and/or red streaks with fever.  Vaginal pain or tissue coming out of vagina.  Abdominal pain or abdomen tender to the touch.  Signs of depression or anxiety, affecting sleep or activities of daily living.    If you have a non-emergent question or would like to schedule a follow up appointment, please call the clinic midwife at [phone].    If you have questions or concerns and need to speak with a midwife immediately, please call the on-call midwife at [phone].      Jaden hamm parent support:    ECFE groups    - If you are in the [facility] school district:    [email or web address]    - If you are in the [place] school district:    [email or web address]

## 2023-01-12 NOTE — PLAN OF CARE
Problem: Postpartum ( Delivery)  Goal: Optimal Pain Control and Function  Outcome: Progressing  Intervention: Prevent or Manage Pain  Recent Flowsheet Documentation  Taken 2023 0059 by Sariah Magana RN  Pain Management Interventions: rest  Taken 2023 0014 by Sariah Magana RN  Pain Management Interventions: medication (see MAR)  Taken 2023 by Sariah Magana RN  Pain Management Interventions:   rest   repositioned  Taken 2023 by Sariah Magana RN  Pain Management Interventions:   medication (see MAR)   shower  Goal: Effective Urinary Elimination  Outcome: Progressing     Patient had 7/10 headache through the night. Improved with oxycodone, tylenol and rest. Plan to breast feed, but due to headache, declining breastfeeding at this time. Encouraged pumping due to supplementing, declined at this time. Fed infant donor breast milk overnight. Needs met, no concerns at this time.

## 2023-01-12 NOTE — PROGRESS NOTES
1400 - Cassidy has had a good day alongside of her  son and FOB Javy.  At this time she is up to shower.  States she does have a constant dull, achy headache.  Encouraged her to use shower and reassess afterwards. 650 mg Tylenol administered.    1630 - Pt up in room.  Again states her headache continues to ache, rating a 4/10 - warm pack provided.    1715 - H/A continues to disturb Cassidy. [facility] notified and asked to assess.      181 - Ibuprofen provided until MDA is able to assess as he is in the OR.

## 2023-01-12 NOTE — PROGRESS NOTES
CNM Courtesy Round:    Patient Name:  Cassidy Fernandes  :      2000  MRN:      [id number]    Assessment:   Day 2 postpartum, s/p  for fetal intolerance of labor.  CNM courtesy round.  Breastfeeding with donor milk supplementation   History of trauma, depression and suicide attempt     Plan:    -Postpartum teaching info shared via AVS. Encouraged continued feeding on cue with pumping as needed. Plans lactation consultation today. Encouraged adequate pain management.   -Discussed return of fertility and contraception plans, condoms.   -Reviewed normal postpartum mood changes and adjustments, postpartum blues vs. postpartum depression, sleep changes and required support. Aware she is at an increased risk for a postpartum mood or anxiety disorder with history of depression. Encouraged to contact CNM with mood concerns. New mother group encouraged and resources near their home in [place] sent via AVS.   -Plan for today: encouraged rest, skin-to-skin, breastfeeding on cue, adequate pain control. Discussed that patient is under physician management with CNM support as needed. Hoping for discharge later today per MD.   -Reviewed outpatient lactation support.   -Discussed postpartum discharge plans including 2 week incision check as well as 2 (for mood assessment) and 6 week CNM postpartum visits.     Subjective:  Cassidy and Javy have been busy with staff visits this morning. Accepting of CNM round at this time. Continue to process birth experience and Cassidy states she is feeling so much better this morning than yesterday and even overnight. Please with her care. Pain is well controlled with current medications. Baby is breast feeding on cue with staff assistance. Looking forward to lactation consultant visit later this morning. Postpartum contraception plans include codoms. Support at home identified as adequate. Javy can work from home for 2 weeks then will be returning to the office.  Patient will  have 12 weeks off from work. Both her mother and her mother in law are local and plan to be supportive of the new family. Patient has a history of depression related to a past trauma. She does have a past suicide attempt.     Objective:  No exam. Courtesy round only.     Provider:  MARY Anglin, BERKLEY, IBCLC  [facility]  Midwifery    Date:  1/12/2023  Time:  11:15 AM

## 2023-01-13 ENCOUNTER — PATIENT OUTREACH (OUTPATIENT)
Dept: CARE COORDINATION | Facility: CLINIC | Age: 23
End: 2023-01-13

## 2023-01-13 NOTE — PROGRESS NOTES
"Clinic Care Coordination Contact  [facility]: Post-Discharge Note  SITUATION                                                      Admission:    Admission Date: 23   Reason for Admission: S/P  section  Discharge:   Discharge Date: 23  Discharge Diagnosis: S/P  section    BACKGROUND                                                      Per hospital discharge summary and inpatient provider notes:    Cassidy Fernandes, is a 22 year old,  AT 38+1 weeks gestation, LMP 3/17/22, Estimated Date of Delivery: 2023, confirmed by 5 week ultrasound, admitted to [facility] on 2023 secondary to: onset of contractions @ 1700, elevated blood pressures on arrival, not severe range, P/C Ratio 5.11  Denies any leakage of fluid, vaginal bleeding or changes in fetal movement.  Denies headache, visual changes or epigastric pain.     ASSESSMENT      Discharge Assessment  How are you doing now that you are home?: \"Doing pretty good tired and run down a little bit\"  How are your symptoms? (Red Flag symptoms escalate to triage hotline per guidelines): Improved  Do you feel your condition is stable enough to be safe at home until your provider visit?: Yes  Does the patient have their discharge instructions? : Yes  Does the patient have questions regarding their discharge instructions? : No  Were you started on any new medications or were there changes to any of your previous medications? : Yes  Does the patient have all of their medications?: Yes  Do you have questions regarding any of your medications? : No  Do you have all of your needed medical supplies or equipment (DME)?  (i.e. oxygen tank, CPAP, cane, etc.): Yes  Discharge follow-up appointment scheduled within 14 calendar days? : No  Is patient agreeable to assistance with scheduling? : No    Post-op ([facility] Only)  If the patient had a surgery or procedure, do they have any questions for a nurse?: No    PLAN      "                                                 Outpatient Plan:    Follow up with provider in 2 weeks and 6 weeks for post-delivery checks    No future appointments.      For any urgent concerns, please contact our 24 hour nurse triage line: [phone])         MISHA Hinds  [phone]  [facility]

## 2023-01-16 ENCOUNTER — NURSE TRIAGE (OUTPATIENT)
Dept: NURSING | Facility: CLINIC | Age: 23
End: 2023-01-16

## 2023-01-16 NOTE — TELEPHONE ENCOUNTER
"Nurse Triage SBAR    Is this a 2nd Level Triage? YES, LICENSED PRACTITIONER REVIEW IS REQUIRED    Situation: postpartum headache severe    Background: Patient calling and is concerned with blood pressure issues with a significant headache since delivery.   Patient states she dealt with pre-eclampsia prior to  and headache has been since then and not improving. Patient states this is worst headache of her life. No blood pressure cuff at home.     Assessment: ED    Protocol Recommended Disposition:   Go to ED Now (Or PCP Triage)    Recommendation: disposition     Routed to provider    Does the patient meet one of the following criteria for ADS visit consideration? 16+ years old, with an [facility] PCP     TIP  Providers, please consider if this condition is appropriate for management at one of our Acute and Diagnostic Services sites.     If patient is a good candidate, please use dotphrase <dot>triageresponse and select Refer to ADS to document.      Patient calling and is concerned with blood pressure issues with a significant headache since delivery.   Patient states she dealt with pre-eclampsia prior to  and headache has been since then and not improving. Patient states this is worst headache of her life. No blood pressure cuff at home.   Protocol recommends ED now or PCP triage  Routing to provider and pool  Call back to patient at [phone]   Care advice to patient.   Leola Mendez RN   23 12:17 PM  [facility] Nurse Advisor        Reason for Disposition    [1] SEVERE headache (e.g., excruciating) AND [2] \"worst headache\" of life    Additional Information    Negative: Difficult to awaken or acting confused (e.g., disoriented, slurred speech)    Negative: [1] Weakness of the face, arm or leg on one side of the body AND [2] new-onset    Negative: [1] Numbness of the face, arm or leg on one side of the body AND [2] new-onset    Negative: [1] Loss of speech or garbled speech AND [2] " new-onset    Negative: Sounds like a life-threatening emergency to the triager    Negative: Followed a head injury within last 3 days    Negative: Traumatic Brain Injury (TBI) is suspected    Negative: Sinus pain of forehead and yellow or green nasal discharge    Negative: Unable to walk, or can only walk with assistance (e.g., requires support)    Negative: Stiff neck (can't touch chin to chest)    Negative: Severe pain in one eye    Negative: [1] Other family members (or roommates) with headaches AND [2] possibility of carbon monoxide exposure    Negative: Systolic BP  >= 140 OR Diastolic BP >= 90     No blood pressure cuff at home.    Protocols used: POSTPARTUM - HEADACHE-A-AH

## 2023-01-16 NOTE — TELEPHONE ENCOUNTER
"Telephone call to patient states she is still dealing with headache that she rates pain at 7 out of 10. Reports that headache is similar to when she was inpatient for delivery of infant. States she has been able to sleep, is well hydrated and has taken both Ibuprofen and Tylenol for pain but has not felt any relief of her headache. Patient states she is also feeling dizzy and \"just off\" today. Denies having a blood pressure cuff at home. Denies any visual changes or epigastric pain. Due to patient history of preeclampsia, recommend patient go to ED now for further evaluation. Patient in agreement and will go there now. All questions answered.   "

## 2023-01-17 ENCOUNTER — APPOINTMENT (OUTPATIENT)
Dept: INTERVENTIONAL RADIOLOGY/VASCULAR | Facility: HOSPITAL | Age: 23
End: 2023-01-17
Attending: NURSE PRACTITIONER
Payer: COMMERCIAL

## 2023-01-17 ENCOUNTER — APPOINTMENT (OUTPATIENT)
Dept: MRI IMAGING | Facility: HOSPITAL | Age: 23
End: 2023-01-17
Attending: EMERGENCY MEDICINE
Payer: COMMERCIAL

## 2023-01-17 ENCOUNTER — HOSPITAL ENCOUNTER (EMERGENCY)
Facility: HOSPITAL | Age: 23
Discharge: HOME | End: 2023-01-17
Attending: EMERGENCY MEDICINE | Admitting: EMERGENCY MEDICINE
Payer: COMMERCIAL

## 2023-01-17 ENCOUNTER — MEDICAL CORRESPONDENCE (OUTPATIENT)
Dept: HEALTH INFORMATION MANAGEMENT | Facility: CLINIC | Age: 23
End: 2023-01-17

## 2023-01-17 VITALS
HEART RATE: 77 BPM | SYSTOLIC BLOOD PRESSURE: 145 MMHG | HEIGHT: 65 IN | TEMPERATURE: 99.4 F | OXYGEN SATURATION: 98 % | BODY MASS INDEX: 38.61 KG/M2 | RESPIRATION RATE: 18 BRPM | DIASTOLIC BLOOD PRESSURE: 87 MMHG

## 2023-01-17 DIAGNOSIS — G97.1 POST-DURAL PUNCTURE HEADACHE: ICD-10-CM

## 2023-01-17 LAB
ALBUMIN MFR UR ELPH: 23.6 MG/DL (ref 1–14)
ALBUMIN SERPL BCG-MCNC: 3.9 G/DL (ref 3.5–5.2)
ALBUMIN UR-MCNC: 50 MG/DL
ALP SERPL-CCNC: 178 U/L (ref 35–104)
ALT SERPL W P-5'-P-CCNC: 38 U/L (ref 10–35)
ANION GAP SERPL CALCULATED.3IONS-SCNC: 16 MMOL/L (ref 7–15)
APPEARANCE UR: CLEAR
AST SERPL W P-5'-P-CCNC: 27 U/L (ref 10–35)
BASOPHILS # BLD MANUAL: 0 10E3/UL (ref 0–0.2)
BASOPHILS NFR BLD MANUAL: 0 %
BILIRUB DIRECT SERPL-MCNC: <0.2 MG/DL (ref 0–0.3)
BILIRUB SERPL-MCNC: 0.3 MG/DL
BILIRUB UR QL STRIP: NEGATIVE
BUN SERPL-MCNC: 14.2 MG/DL (ref 6–20)
CALCIUM SERPL-MCNC: 9.3 MG/DL (ref 8.6–10)
CHLORIDE SERPL-SCNC: 105 MMOL/L (ref 98–107)
COLOR UR AUTO: YELLOW
CREAT SERPL-MCNC: 0.76 MG/DL (ref 0.51–0.95)
CREAT UR-MCNC: 159 MG/DL
DEPRECATED HCO3 PLAS-SCNC: 20 MMOL/L (ref 22–29)
EOSINOPHIL # BLD MANUAL: 0.1 10E3/UL (ref 0–0.7)
EOSINOPHIL NFR BLD MANUAL: 1 %
ERYTHROCYTE [DISTWIDTH] IN BLOOD BY AUTOMATED COUNT: 12.7 % (ref 10–15)
GFR SERPL CREATININE-BSD FRML MDRD: >90 ML/MIN/1.73M2
GLUCOSE SERPL-MCNC: 100 MG/DL (ref 70–99)
GLUCOSE UR STRIP-MCNC: NEGATIVE MG/DL
HCT VFR BLD AUTO: 40.7 % (ref 35–47)
HGB BLD-MCNC: 13.6 G/DL (ref 11.7–15.7)
HGB UR QL STRIP: ABNORMAL
KETONES UR STRIP-MCNC: 100 MG/DL
LEUKOCYTE ESTERASE UR QL STRIP: ABNORMAL
LYMPHOCYTES # BLD MANUAL: 2.9 10E3/UL (ref 0.8–5.3)
LYMPHOCYTES NFR BLD MANUAL: 20 %
MAGNESIUM SERPL-MCNC: 2 MG/DL (ref 1.7–2.3)
MCH RBC QN AUTO: 30.8 PG (ref 26.5–33)
MCHC RBC AUTO-ENTMCNC: 33.4 G/DL (ref 31.5–36.5)
MCV RBC AUTO: 92 FL (ref 78–100)
METAMYELOCYTES # BLD MANUAL: 0.1 10E3/UL
METAMYELOCYTES NFR BLD MANUAL: 1 %
MONOCYTES # BLD MANUAL: 0.4 10E3/UL (ref 0–1.3)
MONOCYTES NFR BLD MANUAL: 3 %
MUCOUS THREADS #/AREA URNS LPF: PRESENT /LPF
NEUTROPHILS # BLD MANUAL: 10.9 10E3/UL (ref 1.6–8.3)
NEUTROPHILS NFR BLD MANUAL: 75 %
NITRATE UR QL: NEGATIVE
PH UR STRIP: 6 [PH] (ref 5–7)
PLAT MORPH BLD: ABNORMAL
PLATELET # BLD AUTO: 524 10E3/UL (ref 150–450)
POLYCHROMASIA BLD QL SMEAR: SLIGHT
POTASSIUM SERPL-SCNC: 3.8 MMOL/L (ref 3.4–5.3)
PROT SERPL-MCNC: 7.1 G/DL (ref 6.4–8.3)
PROT/CREAT 24H UR: 0.15 MG/MG CR (ref 0–0.2)
RBC # BLD AUTO: 4.41 10E6/UL (ref 3.8–5.2)
RBC MORPH BLD: ABNORMAL
RBC URINE: 27 /HPF
SODIUM SERPL-SCNC: 141 MMOL/L (ref 136–145)
SP GR UR STRIP: 1.03 (ref 1–1.03)
SQUAMOUS EPITHELIAL: 2 /HPF
UROBILINOGEN UR STRIP-MCNC: <2 MG/DL
WBC # BLD AUTO: 14.5 10E3/UL (ref 4–11)
WBC URINE: 5 /HPF

## 2023-01-17 PROCEDURE — 96374 THER/PROPH/DIAG INJ IV PUSH: CPT | Mod: 59

## 2023-01-17 PROCEDURE — 84156 ASSAY OF PROTEIN URINE: CPT | Performed by: EMERGENCY MEDICINE

## 2023-01-17 PROCEDURE — 70546 MR ANGIOGRAPH HEAD W/O&W/DYE: CPT

## 2023-01-17 PROCEDURE — A9585 GADOBUTROL INJECTION: HCPCS | Performed by: EMERGENCY MEDICINE

## 2023-01-17 PROCEDURE — 85027 COMPLETE CBC AUTOMATED: CPT | Performed by: EMERGENCY MEDICINE

## 2023-01-17 PROCEDURE — 96375 TX/PRO/DX INJ NEW DRUG ADDON: CPT

## 2023-01-17 PROCEDURE — 83735 ASSAY OF MAGNESIUM: CPT | Performed by: EMERGENCY MEDICINE

## 2023-01-17 PROCEDURE — 62273 INJECT EPIDURAL PATCH: CPT

## 2023-01-17 PROCEDURE — 82248 BILIRUBIN DIRECT: CPT | Performed by: EMERGENCY MEDICINE

## 2023-01-17 PROCEDURE — 96376 TX/PRO/DX INJ SAME DRUG ADON: CPT | Mod: 59

## 2023-01-17 PROCEDURE — 80053 COMPREHEN METABOLIC PANEL: CPT | Performed by: EMERGENCY MEDICINE

## 2023-01-17 PROCEDURE — 255N000002 HC RX 255 OP 636: Performed by: EMERGENCY MEDICINE

## 2023-01-17 PROCEDURE — 70553 MRI BRAIN STEM W/O & W/DYE: CPT

## 2023-01-17 PROCEDURE — 81001 URINALYSIS AUTO W/SCOPE: CPT | Performed by: EMERGENCY MEDICINE

## 2023-01-17 PROCEDURE — 85007 BL SMEAR W/DIFF WBC COUNT: CPT | Performed by: EMERGENCY MEDICINE

## 2023-01-17 PROCEDURE — 70544 MR ANGIOGRAPHY HEAD W/O DYE: CPT

## 2023-01-17 PROCEDURE — 258N000003 HC RX IP 258 OP 636: Performed by: EMERGENCY MEDICINE

## 2023-01-17 PROCEDURE — 36415 COLL VENOUS BLD VENIPUNCTURE: CPT | Performed by: EMERGENCY MEDICINE

## 2023-01-17 PROCEDURE — 96361 HYDRATE IV INFUSION ADD-ON: CPT

## 2023-01-17 PROCEDURE — 250N000011 HC RX IP 250 OP 636: Performed by: EMERGENCY MEDICINE

## 2023-01-17 PROCEDURE — 99285 EMERGENCY DEPT VISIT HI MDM: CPT | Mod: 25

## 2023-01-17 PROCEDURE — 250N000009 HC RX 250: Performed by: RADIOLOGY

## 2023-01-17 RX ORDER — GADOBUTROL 604.72 MG/ML
10 INJECTION INTRAVENOUS ONCE
Status: COMPLETED | OUTPATIENT
Start: 2023-01-17 | End: 2023-01-17

## 2023-01-17 RX ORDER — MORPHINE SULFATE 4 MG/ML
4 INJECTION, SOLUTION INTRAMUSCULAR; INTRAVENOUS ONCE
Status: COMPLETED | OUTPATIENT
Start: 2023-01-17 | End: 2023-01-17

## 2023-01-17 RX ORDER — METOCLOPRAMIDE HYDROCHLORIDE 5 MG/ML
10 INJECTION INTRAMUSCULAR; INTRAVENOUS ONCE
Status: COMPLETED | OUTPATIENT
Start: 2023-01-17 | End: 2023-01-17

## 2023-01-17 RX ORDER — ONDANSETRON 2 MG/ML
8 INJECTION INTRAMUSCULAR; INTRAVENOUS ONCE
Status: COMPLETED | OUTPATIENT
Start: 2023-01-17 | End: 2023-01-17

## 2023-01-17 RX ADMIN — ONDANSETRON 8 MG: 2 INJECTION INTRAMUSCULAR; INTRAVENOUS at 10:08

## 2023-01-17 RX ADMIN — MORPHINE SULFATE 4 MG: 4 INJECTION INTRAVENOUS at 09:34

## 2023-01-17 RX ADMIN — GADOBUTROL 10 ML: 604.72 INJECTION INTRAVENOUS at 08:42

## 2023-01-17 RX ADMIN — METOCLOPRAMIDE 10 MG: 5 INJECTION, SOLUTION INTRAMUSCULAR; INTRAVENOUS at 07:16

## 2023-01-17 RX ADMIN — MORPHINE SULFATE 4 MG: 4 INJECTION INTRAVENOUS at 07:40

## 2023-01-17 RX ADMIN — MORPHINE SULFATE 4 MG: 4 INJECTION INTRAVENOUS at 11:34

## 2023-01-17 RX ADMIN — IOHEXOL 1 ML: 180 INJECTION INTRAVENOUS at 13:19

## 2023-01-17 RX ADMIN — LIDOCAINE HYDROCHLORIDE 10 ML: 10 INJECTION, SOLUTION EPIDURAL; INFILTRATION; INTRACAUDAL; PERINEURAL at 13:14

## 2023-01-17 RX ADMIN — SODIUM CHLORIDE 1000 ML: 9 INJECTION, SOLUTION INTRAVENOUS at 07:46

## 2023-01-17 ASSESSMENT — ACTIVITIES OF DAILY LIVING (ADL)
ADLS_ACUITY_SCORE: 37

## 2023-01-17 NOTE — ED PROVIDER NOTES
EMERGENCY DEPARTMENT ENCOUNTER      NAME: Cassidy Fernandes  AGE: 22 year old female  YOB: 2000  MRN: [id number]  EVALUATION DATE & TIME: No admission date for patient encounter.    PCP: Alyssa Chin    ED PROVIDER: Sherry Dukes MD    Chief Complaint   Patient presents with     Headache     Postpartum Complications         FINAL IMPRESSION:  1. Post-dural puncture headache          ED COURSE & MEDICAL DECISION MAKING:    Pertinent Labs & Imaging studies reviewed. (See chart for details)  22 year old female with history of depression, POD #6 from  for fetal intolerance of labor who presents to the Emergency Department for evaluation of headache since her surgery, global, not positional with nausea and vomiting.  Her headache does not sound classic for post epidural headache given the lack of positional component.  She did have this headache when she was hospitalized and it sounds like did see anesthesia for evaluation of headache and they did not think that this was related to her epidural nor in need of blood patch and neither do I at this point.  She did have some borderline blood pressures when in the hospital, but blood pressures here are in the 130s.  She has not been monitoring her blood pressure at home.  Certainly postpartum preeclampsia could contribute to her symptoms and will work-up for same.  Differential includes venous sinus thrombus, and less likely intracranial mass lesion, aneurysm, ICH.  No reported fevers chills and certainly no meningismus or nuchal rigidity on exam to suspect meningitis.    Patient initially evaluated by myself in triage due to boarding crisis.  IV established, blood obtained.  Given normal saline bolus, Reglan, morphine for symptom control.  CBC, BMP, LFTs, magnesium notable for WBC of 14.5 which is downtrending and platelets of 524 which are likely related to reactive thrombocytosis given recent surgery and postpartum.  Urinalysis, urine protein  to creatinine ratio normal.  MRI, MRA, MRV brain shows evidence of CNS hypotension radiographically.  Case discussed with anesthesia who is happy to do a blood patch, but unfortunately they do not have nursing availability and closer to 2 PM.  I did speak with neuro IR, who is happy to perform blood patch in the interim to expedite symptom control better.  This was performed with improvement of symptoms for patient.  Blood pressures were high in the 150s here, case discussed with OB/GYN, I think patient is hypertensive because of the headache, blood pressure did normalize throughout ED course.  With normal urine creatinine ratio OB/GYN did not think further evaluation for postpartum preeclampsia needed to be performed and we will discharged home with outpatient blood pressure check with nurse midwife later this week.      ED Course as of 01/17/23 1353   Tue Jan 17, 2023   0714 I met with the patient to gather history and to perform my initial exam. We discussed plans for the ED course, including diagnostic testing and treatment.      0728 WBC(!): 14.5  Down from 7d ago   0728 Platelet Count(!): 524  Suspect reactive thrombocytosis given recent section/post partum period   0951 9:51 AM I rechecked and updated the patient with results.     0956 Spoke w Dr. Wooten, anesthesia for blood patch.  Agrees patient needs blood patch, looking to see anesthesia availability to perform same and will call back.   1006 Patient requesting additional antiemetics.  Given Zofran.   1012 Total Protein UR MG/MG CR: 0.15  Urine protein to creatinine ratio within normal limits   1013 BP(!): 152/95   1028 Spoke w anesthesia and no PACU/ERNESTO avail until 1:30-2p. Recommend calling IR to see if they can perform.    1031 I spoke with Dr. Galicia from OBGYN. Ok to follow up with nurse midwife for BP check later this week.    1124 I spoke with neuro IR.    1128 Spoke w neuro IR, NP re: poss IR blood patch   1128 Patient pain increasing  requesting additional analgesia.   1144 Spoke w IR - could do blood patch in the next 1hr.    1150 11:50 AM I rechecked and updated the patient with results.     1201 12:01 PM I rechecked and updated the patient with results. Patient is going to IR now for blood patch.    1351 Patient returned from IR, headache improved.  Will discharge after 1 hour laying flat       Medical Decision Making    History:    Supplemental history from: Spouse at bedside    External Record(s) reviewed: Documented in chart, if applicable.    Work Up:    Chart documentation includes differential considered and any EKGs or imaging independently interpreted by provider, where specified.    In additional to work up documented, I considered the following work up: Documented in chart, if applicable.    External consultation:    Discussion of management with another provider: Documented in chart, if applicable    Complicating factors:    Care impacted by chronic illness: Mental Health    Care affected by social determinants of health: Access to Medical Care    Disposition considerations: Discharge. No recommendations on prescription strength medication(s). N/A.        At the conclusion of the encounter I discussed the results of all of the tests and the disposition. The questions were answered. The patient or family acknowledged understanding and was agreeable with the care plan.    CONSULTS:  Anesthesia  Neuro IR  OB/GYN    MEDICATIONS GIVEN IN THE EMERGENCY:  Medications   metoclopramide (REGLAN) injection 10 mg (10 mg Intravenous Given 1/17/23 0716)   morphine (PF) injection 4 mg (4 mg Intravenous Given 1/17/23 0740)   0.9% sodium chloride BOLUS (0 mLs Intravenous Stopped 1/17/23 1028)   gadobutrol (GADAVIST) injection 10 mL (10 mLs Intravenous Given 1/17/23 0842)   morphine (PF) injection 4 mg (4 mg Intravenous Given 1/17/23 0934)   ondansetron (ZOFRAN) injection 8 mg (8 mg Intravenous Given 1/17/23 1008)   morphine (PF) injection 4 mg (4 mg  "Intravenous Given 23 1134)   lidocaine 1 % (10 mLs Intradermal Given 23 1314)   iohexol (OMNIPAQUE) 180 mg/mL injection 20 mL (1 mL Intrathecal Given 23 1319)       NEW PRESCRIPTIONS STARTED AT TODAY'S ER VISIT  New Prescriptions    No medications on file          =================================================================    HPI    Patient information was obtained from: Patient, spouse    Use of Intrepreter: N/A        Cassidy Fernandes is a 22 year old female with pertinent medical history of asthma, depression, pre-eclampsia in third trimester, nuchal cord compression, , who presents with headache post .     Per chart review, on 1/10 patient had a  by labor and delivery for fetal intolerance, dysfunctional labor, and preeclampsia. The patient was taken to the operating room where Epidural  anesthesia was found to be adequate. Antibiotics were given for infection prophylaxis. Patient tolerated procedure well and was taken to recovery room. Discharged home.     Patient reports intermittent headache for the past week that has become progressively worse over the past two days. Notes the headache is global and non-positional. Patient reports this headache was present after her epidural on 1/10 for a . Nothing improves her headache and \"everything makes it worse\". Endorses nausea and vomiting. Patient has been taking oxycodone for headache without relief. Of note, bowel movements have been normal.     Denies vision changes, leg pain, leg swelling, abdominal pain.       REVIEW OF SYSTEMS  Constitutional:  Denies fever, chills, weight loss or weakness  Eyes:  No pain, discharge, redness. Denies vision changes.   GI:  Denies abdominal pain, diarrhea. Endorses nausea and vomiting.   : Vaginal bleeding like a heavy period, minimal odor  Neurologic:  Denies focal weakness or sensory changes. Endorses headahce    PAST MEDICAL HISTORY:  Past Medical History: "   Diagnosis Date     Contact dermatitis and other eczema, due to unspecified cause      Depressive disorder      Herpes simplex virus (HSV) infection      Migraines      Mononucleosis     with hospitalization     Uncomplicated asthma        PAST SURGICAL HISTORY:  Past Surgical History:   Procedure Laterality Date      SECTION N/A 1/10/2023    Procedure:  SECTION;  Surgeon: Brandi Cortez MD;  [facility]       CURRENT MEDICATIONS:    Prior to Admission Medications   Prescriptions Last Dose Informant Patient Reported? Taking?   Prenatal Vit-Fe Fumarate-FA (PRENATAL MULTIVITAMIN W/IRON) 27-0.8 MG tablet   No No   Sig: Take 1 tablet by mouth daily   albuterol (PROAIR HFA/PROVENTIL HFA/VENTOLIN HFA) 108 (90 Base) MCG/ACT inhaler   No No   Sig: Inhale 2 puffs into the lungs every 6 hours as needed for shortness of breath / dyspnea or wheezing . No further refills until follow-up appointment   cyanocobalamin (VITAMIN B-12) 1000 MCG tablet   No No   Sig: Take 1 tablet (1,000 mcg) by mouth daily   ferrous sulfate (FEROSUL) 325 (65 Fe) MG tablet   No No   Sig: Take 1 tablet (325 mg) by mouth daily (with breakfast)   vitamin C (ASCORBIC ACID) 250 MG tablet   No No   Sig: Take 1 tablet (250 mg) by mouth daily      Facility-Administered Medications: None       ALLERGIES:  Allergies   Allergen Reactions     Seasonal Allergies        FAMILY HISTORY:  Family History   Problem Relation Age of Onset     Personality Disorder Mother         borderline     Other Cancer Father         neuroendocrine pancreatic cancer     Hemochromatosis Maternal Grandmother      Breast Cancer Maternal Grandmother      Suicide Maternal Grandfather      Cancer - colorectal Paternal Grandmother      Thyroid Disease Paternal Grandmother         unknown type     Depression Sister      Anxiety Disorder Sister      No Known Problems Sister      No Known Problems Sister      No Known Problems Maternal Half-Brother      RD  "No family hx of      Diabetes No family hx of      Hypertension No family hx of      Cerebrovascular Disease No family hx of      Prostate Cancer No family hx of        SOCIAL HISTORY:  Social History     Tobacco Use     Smoking status: Never     Passive exposure: Yes     Smokeless tobacco: Never   Substance Use Topics     Alcohol use: Not Currently     Drug use: Not Currently     Types: Marijuana     Comment: x1 in high school        VITALS:  Patient Vitals for the past 24 hrs:   BP Temp Temp src Pulse Resp SpO2 Height   23 1341 -- -- -- 81 -- 99 % --   23 1326 139/82 -- -- -- -- -- --   23 1213 -- 98.4  F (36.9  C) Oral -- -- -- --   23 1145 -- -- -- 72 -- 99 % --   23 1130 139/82 -- -- 68 18 98 % --   23 1115 -- -- -- 68 -- 98 % --   23 1100 136/79 -- -- 74 -- 98 % --   23 1045 135/80 -- -- 70 -- 98 % --   23 1030 135/80 -- -- 71 -- 99 % --   23 1015 131/81 -- -- 63 -- 99 % --   23 1000 (!) 152/95 -- -- 78 16 99 % --   23 0945 (!) 141/88 -- -- 72 16 99 % --   23 0930 (!) 141/89 -- -- -- -- -- --   23 0745 136/84 -- -- 70 16 98 % --   23 0730 122/83 -- -- -- -- -- --   23 0720 -- -- -- -- -- 99 % --   23 0637 133/82 -- -- -- -- -- --   23 0636 -- -- -- -- -- -- 1.651 m (5' 5\")   23 0635 -- 98.1  F (36.7  C) Axillary -- -- -- --   23 0633 137/87 -- -- 80 18 98 % --       PHYSICAL EXAM    General Appearance: Uncomfortable appearing, Holding head and emesis bag.   Head:  Normocephalic, atraumatic  Eyes:  PERRL, conjunctiva/corneas clear, EOM's intact  ENT:  membranes are moist without pallor  Neck:  Supple, no nuchal rigidity or meningismus  Cardio:  Regular rate and rhythm  Pulm:  No respiratory distress  Back:  No CVA tenderness, normal ROM  Abdomen:  Soft, non-tender, non distended,no rebound or guarding.   scar clean dry intact  Extremities: Normal gait  Skin:  Skin warm, dry, no " mauro  Neuro:  Alert and oriented ×3, moving all extremities, no gross sensory defects. Cranial nerves 3-12 intact. 5/5 upper extremity strength. No aphasia, no dysarthria.      RADIOLOGY/LABS:  Reviewed all pertinent imaging. Please see official radiology report. All pertinent labs reviewed and interpreted.    Results for orders placed or performed during the hospital encounter of 01/17/23   MR Brain w/o & w Contrast    Impression    IMPRESSION:   HEAD MRI:   1.  Mild diffuse pachymeningeal thickening/enhancement, sagged morphology of the mid brain, pituitary enlargement, and altered pontomedullary distance with low-lying cerebellar tonsils, reduction of fluid within the optic nerve sheaths, and additional   findings as above. Imaging findings are strongly suggestive of intracranial manifestations of CSF hypotension.    2.  Nothing for acute or evolving infarction.    3.  No recent or chronic hemorrhage is otherwise noted.    4.  No additional pathologic enhancement.    5.  See above for details and full description.    HEAD MRV:   1.  No dural venous sinus thrombosis or significant stenosis.    MRA [place] WITHOUT CONTRAST:  1.  No high-grade stenosis intracranially. No dissection, large vessel occlusion, or aneurysm. There is mild narrowing of the high left cervical vertical petrous ICA segment.   MRA Brain ([place]) wo Contrast    Impression    IMPRESSION:   HEAD MRI:   1.  Mild diffuse pachymeningeal thickening/enhancement, sagged morphology of the mid brain, pituitary enlargement, and altered pontomedullary distance with low-lying cerebellar tonsils, reduction of fluid within the optic nerve sheaths, and additional   findings as above. Imaging findings are strongly suggestive of intracranial manifestations of CSF hypotension.    2.  Nothing for acute or evolving infarction.    3.  No recent or chronic hemorrhage is otherwise noted.    4.  No additional pathologic enhancement.    5.  See  above for details and full description.    HEAD MRV:   1.  No dural venous sinus thrombosis or significant stenosis.    MRA [place] WITHOUT CONTRAST:  1.  No high-grade stenosis intracranially. No dissection, large vessel occlusion, or aneurysm. There is mild narrowing of the high left cervical vertical petrous ICA segment.   MRV Brain wo & w Contrast    Impression    IMPRESSION:   HEAD MRI:   1.  Mild diffuse pachymeningeal thickening/enhancement, sagged morphology of the mid brain, pituitary enlargement, and altered pontomedullary distance with low-lying cerebellar tonsils, reduction of fluid within the optic nerve sheaths, and additional   findings as above. Imaging findings are strongly suggestive of intracranial manifestations of CSF hypotension.    2.  Nothing for acute or evolving infarction.    3.  No recent or chronic hemorrhage is otherwise noted.    4.  No additional pathologic enhancement.    5.  See above for details and full description.    HEAD MRV:   1.  No dural venous sinus thrombosis or significant stenosis.    MRA [place] WITHOUT CONTRAST:  1.  No high-grade stenosis intracranially. No dissection, large vessel occlusion, or aneurysm. There is mild narrowing of the high left cervical vertical petrous ICA segment.   Basic metabolic panel   Result Value Ref Range    Sodium 141 136 - 145 mmol/L    Potassium 3.8 3.4 - 5.3 mmol/L    Chloride 105 98 - 107 mmol/L    Carbon Dioxide (CO2) 20 (L) 22 - 29 mmol/L    Anion Gap 16 (H) 7 - 15 mmol/L    Urea Nitrogen 14.2 6.0 - 20.0 mg/dL    Creatinine 0.76 0.51 - 0.95 mg/dL    Calcium 9.3 8.6 - 10.0 mg/dL    Glucose 100 (H) 70 - 99 mg/dL    GFR Estimate >90 >60 mL/min/1.73m2   Hepatic function panel   Result Value Ref Range    Protein Total 7.1 6.4 - 8.3 g/dL    Albumin 3.9 3.5 - 5.2 g/dL    Bilirubin Total 0.3 <=1.2 mg/dL    Alkaline Phosphatase 178 (H) 35 - 104 U/L    AST 27 10 - 35 U/L    ALT 38 (H) 10 - 35 U/L    Bilirubin Direct <0.20 0.00  - 0.30 mg/dL   Result Value Ref Range    Magnesium 2.0 1.7 - 2.3 mg/dL   UA with Microscopic reflex to Culture    Specimen: Urine, Midstream   Result Value Ref Range    Color Urine Yellow Colorless, Straw, Light Yellow, Yellow    Appearance Urine Clear Clear    Glucose Urine Negative Negative mg/dL    Bilirubin Urine Negative Negative    Ketones Urine 100 (A) Negative mg/dL    Specific Gravity Urine 1.033 (H) 1.001 - 1.030    Blood Urine >1.0 mg/dL (A) Negative    pH Urine 6.0 5.0 - 7.0    Protein Albumin Urine 50 (A) Negative mg/dL    Urobilinogen Urine <2.0 <2.0 mg/dL    Nitrite Urine Negative Negative    Leukocyte Esterase Urine 75 Elva/uL (A) Negative    Mucus Urine Present (A) None Seen /LPF    RBC Urine 27 (H) <=2 /HPF    WBC Urine 5 <=5 /HPF    Squamous Epithelials Urine 2 (H) <=1 /HPF   Protein  random urine   Result Value Ref Range    Total Protein Urine mg/dL 23.6 (H) 1.0 - 14.0 mg/dL    Total Protein UR MG/MG CR 0.15 0.00 - 0.20 mg/mg Cr    Creatinine Urine mg/dL 159.0 mg/dL   CBC with platelets and differential   Result Value Ref Range    WBC Count 14.5 (H) 4.0 - 11.0 10e3/uL    RBC Count 4.41 3.80 - 5.20 10e6/uL    Hemoglobin 13.6 11.7 - 15.7 g/dL    Hematocrit 40.7 35.0 - 47.0 %    MCV 92 78 - 100 fL    MCH 30.8 26.5 - 33.0 pg    MCHC 33.4 31.5 - 36.5 g/dL    RDW 12.7 10.0 - 15.0 %    Platelet Count 524 (H) 150 - 450 10e3/uL   Manual Differential   Result Value Ref Range    % Neutrophils 75 %    % Lymphocytes 20 %    % Monocytes 3 %    % Eosinophils 1 %    % Basophils 0 %    % Metamyelocytes 1 %    Absolute Neutrophils 10.9 (H) 1.6 - 8.3 10e3/uL    Absolute Lymphocytes 2.9 0.8 - 5.3 10e3/uL    Absolute Monocytes 0.4 0.0 - 1.3 10e3/uL    Absolute Eosinophils 0.1 0.0 - 0.7 10e3/uL    Absolute Basophils 0.0 0.0 - 0.2 10e3/uL    Absolute Metamyelocytes 0.1 (H) <=0.0 10e3/uL    RBC Morphology Confirmed RBC Indices     Platelet Assessment  Automated Count Confirmed. Platelet morphology is normal.      Automated Count Confirmed. Platelet morphology is normal.    Polychromasia Slight (A) None Seen         The creation of this record is based on the scribe s observations of the work being performed by Sherry Dukes MD and the provider s statements to them. It was created on her behalf by Natacha Oliva, a trained medical scribe. This document has been checked and approved by the attending provider.    Sherry Dukes MD  Emergency Medicine  [facility] EMERGENCY DEPARTMENT  [address] [place] [id number] [phone]  Dept: [phone]       Sherry Dukes MD  01/17/23 9115

## 2023-01-17 NOTE — ED NOTES
Pt endorses she got bit by cat on right inner thigh once she got home from hospital after c section

## 2023-01-17 NOTE — ED TRIAGE NOTES
Pt arrives to triage w/ significant other after  last week endorsing headache 10/10 since delivery but worse today, dizziness, nausea/vomiting, HTN w/ baby pre-delivery. Reports concern of high blood pressure

## 2023-01-17 NOTE — ED TRIAGE NOTES
Spoke w/ L&D charge RN to notify of symptoms and recommends L&D MD consult and L&D has beds for pt if need be

## 2023-01-17 NOTE — ED NOTES
Pt back from MRI--assisted her to the bathroom and urine spec collected. Pt appears brighter, less withdrawn from the headache as she presented earlier. Pt is requesting more pain medication--spoke with MD--morphine dose repeated as ordered.

## 2023-01-17 NOTE — PROCEDURES
Neurointerventional Surgery  Post-procedure     Patient Name: Cassidy Fernandes  MRN: [id number]  Date of Procedure: January 17, 2023    Procedure: Lumbar epidural blood patch  Radiologist: Arnold Mae MD    Contrast: 1 ml Omni 180  Fluoro Time: 0.5 minutes  Air Kerma: 23 mGy    Medications:none  EBL: min   Complications: none    Patient reevaluated immediately prior to sedation and prior to procedure.    Preliminary Report:   (See dictation for full detail)  20 ml peripheral blood instilled into dorsal epidural space at L2-3    Assess/Plan:  Bedrest x1 , then OK to discharge if no issues  Report to ordering    Arnold Mae MD  Emergency pager: [phone]  Office: [phone]

## 2023-01-19 NOTE — TELEPHONE ENCOUNTER
Jaki    The insurance company will only cover a 90 day supply, would you change to 90 day    Pharm cued    Brittany Bridges RN   [facility]           Secondary to acute stroke.    Aspiration precautions   PEG 1/24  Nutrition following  SLP following  Patient is s/p FEES 2/8, patient continues to tolerate modified diet. PEG tube to remain in place at this time and reevaluate removal after he completes rehab

## 2023-01-27 ENCOUNTER — OFFICE VISIT (OUTPATIENT)
Dept: MIDWIFE SERVICES | Facility: CLINIC | Age: 23
End: 2023-01-27
Payer: COMMERCIAL

## 2023-01-27 ENCOUNTER — MEDICAL CORRESPONDENCE (OUTPATIENT)
Dept: HEALTH INFORMATION MANAGEMENT | Facility: CLINIC | Age: 23
End: 2023-01-27

## 2023-01-27 VITALS
HEART RATE: 76 BPM | SYSTOLIC BLOOD PRESSURE: 110 MMHG | WEIGHT: 196 LBS | DIASTOLIC BLOOD PRESSURE: 64 MMHG | BODY MASS INDEX: 32.62 KG/M2

## 2023-01-27 DIAGNOSIS — Z98.891 S/P CESAREAN SECTION: ICD-10-CM

## 2023-01-27 DIAGNOSIS — Z30.8 ENCOUNTER FOR OTHER CONTRACEPTIVE MANAGEMENT: ICD-10-CM

## 2023-01-27 DIAGNOSIS — T43.014D: ICD-10-CM

## 2023-01-27 DIAGNOSIS — F33.1 MAJOR DEPRESSIVE DISORDER, RECURRENT, MODERATE (H): ICD-10-CM

## 2023-01-27 DIAGNOSIS — F43.23 ADJUSTMENT DISORDER WITH MIXED ANXIETY AND DEPRESSED MOOD: ICD-10-CM

## 2023-01-27 DIAGNOSIS — F32.A DEPRESSION, UNSPECIFIED DEPRESSION TYPE: ICD-10-CM

## 2023-01-27 DIAGNOSIS — F43.10 POSTTRAUMATIC STRESS DISORDER: ICD-10-CM

## 2023-01-27 DIAGNOSIS — O14.93 PRE-ECLAMPSIA IN THIRD TRIMESTER: ICD-10-CM

## 2023-01-27 PROBLEM — O36.8390 FETAL HEART RATE DECELERATIONS AFFECTING MANAGEMENT OF MOTHER: Status: RESOLVED | Noted: 2023-01-10 | Resolved: 2023-01-27

## 2023-01-27 PROBLEM — O26.849 UTERINE SIZE DATE DISCREPANCY, ANTEPARTUM: Status: RESOLVED | Noted: 2022-12-26 | Resolved: 2023-01-27

## 2023-01-27 PROBLEM — O98.519 COVID-19 AFFECTING PREGNANCY, ANTEPARTUM: Status: RESOLVED | Noted: 2022-11-07 | Resolved: 2023-01-27

## 2023-01-27 PROBLEM — M79.10 MUSCLE SORENESS: Status: RESOLVED | Noted: 2022-12-27 | Resolved: 2023-01-27

## 2023-01-27 PROBLEM — Z34.90 ENCOUNTER FOR INDUCTION OF LABOR: Status: RESOLVED | Noted: 2023-01-09 | Resolved: 2023-01-27

## 2023-01-27 PROBLEM — O99.820 GBS (GROUP B STREPTOCOCCUS CARRIER), +RV CULTURE, CURRENTLY PREGNANT: Status: RESOLVED | Noted: 2022-12-16 | Resolved: 2023-01-27

## 2023-01-27 PROBLEM — A60.04 HERPES SIMPLEX VULVOVAGINITIS: Status: RESOLVED | Noted: 2019-04-24 | Resolved: 2023-01-27

## 2023-01-27 PROBLEM — U07.1 COVID-19 AFFECTING PREGNANCY, ANTEPARTUM: Status: RESOLVED | Noted: 2022-11-07 | Resolved: 2023-01-27
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
## 2023-01-27 PROCEDURE — 99024 POSTOP FOLLOW-UP VISIT: CPT | Performed by: ADVANCED PRACTICE MIDWIFE

## 2023-01-27 ASSESSMENT — ANXIETY QUESTIONNAIRES
IF YOU CHECKED OFF ANY PROBLEMS ON THIS QUESTIONNAIRE, HOW DIFFICULT HAVE THESE PROBLEMS MADE IT FOR YOU TO DO YOUR WORK, TAKE CARE OF THINGS AT HOME, OR GET ALONG WITH OTHER PEOPLE: SOMEWHAT DIFFICULT
2. NOT BEING ABLE TO STOP OR CONTROL WORRYING: SEVERAL DAYS
GAD7 TOTAL SCORE: 6
1. FEELING NERVOUS, ANXIOUS, OR ON EDGE: SEVERAL DAYS
6. BECOMING EASILY ANNOYED OR IRRITABLE: SEVERAL DAYS
7. FEELING AFRAID AS IF SOMETHING AWFUL MIGHT HAPPEN: SEVERAL DAYS
GAD7 TOTAL SCORE: 6
3. WORRYING TOO MUCH ABOUT DIFFERENT THINGS: SEVERAL DAYS
5. BEING SO RESTLESS THAT IT IS HARD TO SIT STILL: NOT AT ALL

## 2023-01-27 ASSESSMENT — PATIENT HEALTH QUESTIONNAIRE - PHQ9: 5. POOR APPETITE OR OVEREATING: SEVERAL DAYS

## 2023-01-27 NOTE — DISCHARGE SUMMARY
[facility] Discharge Summary    Cassidy Fernandes MRN# [id number]   Age: 22 year old YOB: 2000     Date of Admission:  2023  Date of Discharge::  2023  3:07 PM  Admitting Physician:  MARY Zarate CNMAITE  Discharge Physician:  Brandi Cortez MD     Home clinic: [facility]           Admission Diagnoses:   Encounter for triage in pregnant patient [Z36.89]  Normal labor [O80, Z37.9]  Preeclampsia           Discharge Diagnosis:   Fetal intolerance to labor, delivered, current hospitalization [O77.9]   s/p csection         Procedures:   Procedure(s): Primary low transverse  section                  Medications Prior to Admission:     No medications prior to admission.             Discharge Medications:     Discharge Medication List as of 2023  2:16 PM      CONTINUE these medications which have NOT CHANGED    Details   albuterol (PROAIR HFA/PROVENTIL HFA/VENTOLIN HFA) 108 (90 Base) MCG/ACT inhaler Inhale 2 puffs into the lungs every 6 hours as needed for shortness of breath / dyspnea or wheezing . No further refills until follow-up appointment, Disp-18 g, R-0, E-PrescribePharmacy may dispense brand covered by insurance (Proair, or proventil or ve ntolin or generic albuterol inhaler)      cyanocobalamin (VITAMIN B-12) 1000 MCG tablet Take 1 tablet (1,000 mcg) by mouth daily, Disp-30 tablet, R-3, E-Prescribe      ferrous sulfate (FEROSUL) 325 (65 Fe) MG tablet Take 1 tablet (325 mg) by mouth daily (with breakfast), Disp-60 tablet, R-3, E-Prescribe      Prenatal Vit-Fe Fumarate-FA (PRENATAL MULTIVITAMIN W/IRON) 27-0.8 MG tablet Take 1 tablet by mouth daily, Disp-90 tablet, R-3, E-Prescribe      vitamin C (ASCORBIC ACID) 250 MG tablet Take 1 tablet (250 mg) by mouth daily, Disp-30 tablet, R-3, E-Prescribe         STOP taking these medications       valACYclovir (VALTREX) 500 MG tablet Comments:   Reason for Stopping:                     Consultations:    OBGYN for csection           Brief History of Labor or Admission:   patient admitted for preeclampsia and induction started.  She did no progress beyod 6 cm despite adequate labor.  She also had prolonged category 2 tracing.  She was then taken to OR for csection.  There were no operative complications.            Hospital Course:   The patient's hospital course was unremarkable.  She recovered as anticipated and experienced no post-operative complications.  On discharge, her pain was well controlled. Vaginal bleeding is similar to peak menstrual flow.  Voiding without difficulty.  Ambulating well and tolerating a normal diet.  No fever or significant wound drainage.  Breastfeeding well.  Infant is stable.  No bowel movement yet.  She was discharged on post-partum day #32.    Post-partum hemoglobin:   Hemoglobin   Date Value Ref Range Status   01/17/2023 13.6 11.7 - 15.7 g/dL Final   07/10/2019 13.5 11.7 - 15.7 g/dL Final             Discharge Instructions and Follow-Up:   Discharge diet: Regular   Discharge activity: No heavy lifting, pushing, pulling for 6 week(s)  No driving or operating machinery while on narcotic analgesics   Discharge follow-up: Follow up with [facility]  in 1-2 weeks   Wound care: Drink plenty of fluids  Ice to area for comfort  Keep wound clean and dry           Discharge Disposition:   Discharged to home      Attestation:  I have reviewed today's vital signs, notes, medications, labs and imaging.    Brandi Cortez MD

## 2023-01-27 NOTE — PROGRESS NOTES
"2-week Postpartum Visit:     Assessment:   1.  2 weeks postpartum, status post primary  for fetal intolerance of labor  2.  Preeclampsia in third trimester, normotensive  3.  Lactating mother, mostly pumping due to  tongue-tie revised yesterday  4.  Status post epidural headache RESOLVED with blood patch  5.  Contraceptive management-condoms    Plan:   - Reviewed warning signs of postpartum depression. [place]  Mental Health Resource List given for future reference prn.   -PP exercises reviewed and encouraged modified abdominal crunches and Kegels daily. Encouraged integrating formal exercise, such as walking 20-30mns daily.   -Warning signs of breast infections of mastitis and thrush reviewed. Breastfeeding support resource list and contact info given, including [facility] and [facility].   -Encouraged to continue with PNV, Vitamin D and DHA supplements.   - Return of fertility discussed. Plans condoms for contraception.   -Reviewed warning signs of pelvic pain, excessive bleeding or abdominal pain, fever/chills, or signs of breast infection.   -Referral given for [facility] OB/GYN 2-week follow-up after  birth with Dr. Cortez.  -Labs today: pap, Hgb d/t hx of postpartum hemorrhage, fasting blood sugar (yearly if GDM).   -RTC for 6-week postpartum visit or sooner as needed.     TT with patient 25 mns, >50% time spent in counseling or coordination of care.     Subjective:   Cassidy is a 21yo, here for her 2 week postpartum exam. She is integrating birth experience/care and transition well. Bleeding is light pink.  She had a headache in the first week postpartum and was seen in the emergency department where a blood patch was recommended for suspicion of a post epidural headache; headache resolved within hours.  Denies pain. Reports normal bowel and bladder functioning. EPDS score 7/30, 0 to #10 and SIRISHA-7: 6, somewhat difficult.\".  Patient " has a history of anxiety, depression and trauma.  She has a counselor named Anna Marie who she plans to reconnect with.  She has never been on medication therapy for mental health.  She has been hospitalized after a suicide attempt in 2018 where she attempted to overdose on amitriptyline given to her for pain.  Reports good family/friend support.  Breastfeeding well-established.  Mostly exclusively pumping due to a  tongue-tie which was revised yesterday.  She would like to resume breast-feeding and is open to a lactation consultation.     Has not resumed intercourse. Denies pain or concerns. Plans to use condoms for contraception.     Review of Systems  Pertinent items are noted in HPI.      Objective:     Physical Exam:  General: Pleasant, articulate, well-groomed, well-nourished female.  Not in any apparent distress.    Abdomen: Soft, nontender,  incision is clean, dry and intact without erythema, edema or purulent discharge.  There is a 1 mm protrusion of granulomatous tissue on the maternal right, and a 1 cm suture on maternal left.

## 2023-02-14 NOTE — PROGRESS NOTES
EPDS was done in clinic today during child's 1 month visit and score was 11 with negative psychosis and negative screen for suicidal ideation.   Cassidy has history of depression. Not currently on medication or seeing a therapist.    Plan:   Follow up within 1-2 weeks with OB/primary care provider was recommended. and Follow up this week with Behavioral Health provider.  Behavioral health referral was placed non-urgent.

## 2023-04-15 ENCOUNTER — HEALTH MAINTENANCE LETTER (OUTPATIENT)
Age: 23
End: 2023-04-15

## 2023-04-26 PROBLEM — O14.93 PRE-ECLAMPSIA IN THIRD TRIMESTER: Status: RESOLVED | Noted: 2023-01-09 | Resolved: 2023-04-26

## 2023-04-26 PROBLEM — F32.A DEPRESSED: Status: RESOLVED | Noted: 2017-12-15 | Resolved: 2023-04-26

## 2023-04-26 PROBLEM — Z87.59 HISTORY OF PRE-ECLAMPSIA: Status: ACTIVE | Noted: 2023-04-26

## 2023-04-27 ENCOUNTER — MEDICAL CORRESPONDENCE (OUTPATIENT)
Dept: HEALTH INFORMATION MANAGEMENT | Facility: CLINIC | Age: 23
End: 2023-04-27
Payer: COMMERCIAL

## 2023-06-02 ENCOUNTER — OFFICE VISIT (OUTPATIENT)
Dept: FAMILY MEDICINE | Facility: CLINIC | Age: 23
End: 2023-06-02
Payer: COMMERCIAL

## 2023-06-02 VITALS
TEMPERATURE: 98.4 F | RESPIRATION RATE: 16 BRPM | HEIGHT: 65 IN | WEIGHT: 205 LBS | OXYGEN SATURATION: 97 % | DIASTOLIC BLOOD PRESSURE: 66 MMHG | HEART RATE: 70 BPM | SYSTOLIC BLOOD PRESSURE: 103 MMHG | BODY MASS INDEX: 34.16 KG/M2

## 2023-06-02 DIAGNOSIS — Z30.011 ENCOUNTER FOR INITIAL PRESCRIPTION OF CONTRACEPTIVE PILLS: ICD-10-CM

## 2023-06-02 DIAGNOSIS — M25.532 LEFT WRIST PAIN: ICD-10-CM

## 2023-06-02 DIAGNOSIS — Z00.00 ROUTINE HISTORY AND PHYSICAL EXAMINATION OF ADULT: Primary | ICD-10-CM

## 2023-06-02 PROCEDURE — 99395 PREV VISIT EST AGE 18-39: CPT | Performed by: PHYSICIAN ASSISTANT

## 2023-06-02 PROCEDURE — 99213 OFFICE O/P EST LOW 20 MIN: CPT | Mod: 25 | Performed by: PHYSICIAN ASSISTANT

## 2023-06-02 RX ORDER — ACETAMINOPHEN AND CODEINE PHOSPHATE 120; 12 MG/5ML; MG/5ML
0.35 SOLUTION ORAL DAILY
Qty: 84 TABLET | Refills: 0 | Status: SHIPPED | OUTPATIENT
Start: 2023-06-02 | End: 2023-09-25

## 2023-06-02 ASSESSMENT — ENCOUNTER SYMPTOMS
HEMATOCHEZIA: 0
DYSURIA: 0
EYE PAIN: 0
NAUSEA: 0
JOINT SWELLING: 0
ABDOMINAL PAIN: 0
COUGH: 0
MYALGIAS: 0
SORE THROAT: 0
PALPITATIONS: 0
BREAST MASS: 0
FEVER: 0
PARESTHESIAS: 0
CHILLS: 0
WEAKNESS: 0
DIARRHEA: 0
ARTHRALGIAS: 0
FREQUENCY: 0
HEADACHES: 0
CONSTIPATION: 0
NERVOUS/ANXIOUS: 0
HEMATURIA: 0
DIZZINESS: 0
SHORTNESS OF BREATH: 0
HEARTBURN: 0

## 2023-06-02 ASSESSMENT — PATIENT HEALTH QUESTIONNAIRE - PHQ9
SUM OF ALL RESPONSES TO PHQ QUESTIONS 1-9: 6
SUM OF ALL RESPONSES TO PHQ QUESTIONS 1-9: 6
10. IF YOU CHECKED OFF ANY PROBLEMS, HOW DIFFICULT HAVE THESE PROBLEMS MADE IT FOR YOU TO DO YOUR WORK, TAKE CARE OF THINGS AT HOME, OR GET ALONG WITH OTHER PEOPLE: SOMEWHAT DIFFICULT

## 2023-06-02 ASSESSMENT — ASTHMA QUESTIONNAIRES
QUESTION_4 LAST FOUR WEEKS HOW OFTEN HAVE YOU USED YOUR RESCUE INHALER OR NEBULIZER MEDICATION (SUCH AS ALBUTEROL): NOT AT ALL
ACT_TOTALSCORE: 25
QUESTION_3 LAST FOUR WEEKS HOW OFTEN DID YOUR ASTHMA SYMPTOMS (WHEEZING, COUGHING, SHORTNESS OF BREATH, CHEST TIGHTNESS OR PAIN) WAKE YOU UP AT NIGHT OR EARLIER THAN USUAL IN THE MORNING: NOT AT ALL
ACT_TOTALSCORE: 25
QUESTION_2 LAST FOUR WEEKS HOW OFTEN HAVE YOU HAD SHORTNESS OF BREATH: NOT AT ALL
QUESTION_1 LAST FOUR WEEKS HOW MUCH OF THE TIME DID YOUR ASTHMA KEEP YOU FROM GETTING AS MUCH DONE AT WORK, SCHOOL OR AT HOME: NONE OF THE TIME
QUESTION_5 LAST FOUR WEEKS HOW WOULD YOU RATE YOUR ASTHMA CONTROL: COMPLETELY CONTROLLED

## 2023-06-02 ASSESSMENT — PAIN SCALES - GENERAL: PAINLEVEL: NO PAIN (0)

## 2023-06-02 NOTE — PROGRESS NOTES
SUBJECTIVE:   CC: Cassidy is an 22 year old who presents for preventive health visit.       6/2/2023     2:49 PM   Additional Questions   Roomed by Joanna   Accompanied by self         6/2/2023     2:49 PM   Patient Reported Additional Medications   Patient reports taking the following new medications none     1. Routine history and physical examination of adult  Completed,  5 months post-partum. Had some mild post-partum depression and lost her dad in April although feels like she is getting through it ok on her own.  Stopped breastfeeding 2 weeks ago    2. Encounter for initial prescription of contraceptive pills  Will try mini pill  - norethindrone (MICRONOR) 0.35 MG tablet; Take 1 tablet (0.35 mg) by mouth daily  Dispense: 84 tablet; Refill: 0  Tried IUD(mirena)- mood swings, depo- weight gain, patch- allergy to adhesive thus far. Has occasional migraines so would be poor candidate for estrogen containing pills.  Try mini pill and if ineffective suggestion would be to try copper IUD    3. Left wrist pain  Dating back three years  - EMG; Future  Not flared up today and before pregnancy. Primarily dorsal side of hand. 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 Pertinent negatives include no abdominal pain, arthralgias, chest pain, chills, congestion, coughing, fever, headaches, joint swelling, myalgias, nausea, sore throat or weakness.   Healthy Habits:     Getting at least 3 servings of Calcium per day:  Yes    Bi-annual eye exam:  Yes    Dental care twice a year:  NO    Sleep apnea or symptoms of sleep apnea:  None    Diet:  Regular (no restrictions)    Frequency of exercise:  2-3 days/week    Duration of exercise:  30-45 minutes    Taking medications regularly:  Yes    Medication side effects:  None    PHQ-2 Total Score: 1    Additional concerns today:  No                      Social History     Tobacco Use     Smoking status: Never     Passive exposure: Yes      Smokeless tobacco: Never   Vaping Use     Vaping status: Not on file   Substance Use Topics     Alcohol use: Not Currently             2023     2:37 PM   Alcohol Use   Prescreen: >3 drinks/day or >7 drinks/week? No     Reviewed orders with patient.  Reviewed health maintenance and updated orders accordingly - Yes      Breast Cancer Screening:        History of abnormal Pap smear: NO - age 21-29 PAP every 3 years recommended      3/8/2022    10:45 AM   PAP / HPV   PAP Negative for Intraepithelial Lesion or Malignancy (NILM)       Reviewed and updated as needed this visit by clinical staff   Tobacco  Allergies  Meds              Reviewed and updated as needed this visit by Provider                 Past Medical History:   Diagnosis Date     Contact dermatitis and other eczema, due to unspecified cause      Depressive disorder      Herpes simplex virus (HSV) infection      Migraines      Mononucleosis     with hospitalization     Uncomplicated asthma       Past Surgical History:   Procedure Laterality Date      SECTION N/A 1/10/2023    Procedure:  SECTION;  Surgeon: Brandi Cortez MD;  Location: [facility] OR     IR BLOOD PATCH  2023       Review of Systems   Constitutional: Negative for chills and fever.   HENT: Negative for congestion, ear pain, hearing loss and sore throat.    Eyes: Negative for pain and visual disturbance.   Respiratory: Negative for cough and shortness of breath.    Cardiovascular: Negative for chest pain, palpitations and peripheral edema.   Gastrointestinal: Negative for abdominal pain, constipation, diarrhea, heartburn, hematochezia and nausea.   Breasts:  Negative for tenderness, breast mass and discharge.   Genitourinary: Negative for dysuria, frequency, genital sores, hematuria, pelvic pain, urgency, vaginal bleeding and vaginal discharge.   Musculoskeletal: Negative for arthralgias, joint swelling and myalgias.   Skin: Positive for rash.   Neurological:  "Negative for dizziness, weakness, headaches and paresthesias.   Psychiatric/Behavioral: Positive for mood changes. The patient is not nervous/anxious.           OBJECTIVE:   /66 (BP Location: Right arm, Patient Position: Sitting, Cuff Size: Adult Large)   Pulse 70   Temp 98.4  F (36.9  C) (Oral)   Resp 16   Ht 1.65 m (5' 4.96\")   Wt 93 kg (205 lb)   LMP 03/26/2022 (Approximate)   SpO2 97%   Breastfeeding No   BMI 34.15 kg/m         Physical Exam  GENERAL: healthy, alert and no distress  EYES: Eyes grossly normal to inspection, PERRL and conjunctivae and sclerae normal  HENT: ear canals and TM's normal, nose and mouth without ulcers or lesions  NECK: no adenopathy, no asymmetry, masses, or scars and thyroid normal to palpation  RESP: lungs clear to auscultation - no rales, rhonchi or wheezes  CV: regular rate and rhythm, normal S1 S2, no S3 or S4, no murmur, click or rub, no peripheral edema and peripheral pulses strong  ABDOMEN: soft, nontender, no hepatosplenomegaly, no masses and bowel sounds normal  MS: no gross musculoskeletal defects noted, no edema          COUNSELING:  Reviewed preventive health counseling, as reflected in patient instructions       Regular exercise       Healthy diet/nutrition      BMI:   Estimated body mass index is 34.15 kg/m  as calculated from the following:    Height as of this encounter: 1.65 m (5' 4.96\").    Weight as of this encounter: 93 kg (205 lb).   Weight management plan: Discussed healthy diet and exercise guidelines      She reports that she has never smoked. She has been exposed to tobacco smoke. She has never used smokeless tobacco.          NEELAM CAMEJO PA-C  [facility]  Answers for HPI/ROS submitted by the patient on 6/2/2023  If you checked off any problems, how difficult have these problems made it for you to do your work, take care of things at home, or get along with other people?: Somewhat difficult  PHQ9 TOTAL SCORE: 6      "

## 2023-08-04 NOTE — PROGRESS NOTES
Nursing admission note:  D) Patient admitted to the day treatment  program today.  Pt. discharged from   on 12/19/18.  See inpatient chart & intake assessment for more information. Pt had a suicide attempt by overdose, she states it was impulsive and that she had not planned it out.  Allergies: NKDA.  Current medications: none.  I) Pt given tour of unit and introduced to staff and peers.  Reviewed program rules and expectations with pt and family. P) Family therapy, group therapy, individual therapy.       Initial (On Arrival)

## 2023-09-25 ENCOUNTER — OFFICE VISIT (OUTPATIENT)
Dept: MIDWIFE SERVICES | Facility: CLINIC | Age: 23
End: 2023-09-25
Payer: COMMERCIAL

## 2023-09-25 VITALS — BODY MASS INDEX: 33.65 KG/M2 | WEIGHT: 202 LBS | DIASTOLIC BLOOD PRESSURE: 76 MMHG | SYSTOLIC BLOOD PRESSURE: 112 MMHG

## 2023-09-25 DIAGNOSIS — Z91.51 HISTORY OF SUICIDE ATTEMPT: ICD-10-CM

## 2023-09-25 DIAGNOSIS — Z30.8 ENCOUNTER FOR OTHER CONTRACEPTIVE MANAGEMENT: ICD-10-CM

## 2023-09-25 DIAGNOSIS — F43.10 POSTTRAUMATIC STRESS DISORDER: ICD-10-CM

## 2023-09-25 DIAGNOSIS — F33.1 MAJOR DEPRESSIVE DISORDER, RECURRENT, MODERATE (H): Primary | ICD-10-CM

## 2023-09-25 DIAGNOSIS — F43.23 ADJUSTMENT DISORDER WITH MIXED ANXIETY AND DEPRESSED MOOD: ICD-10-CM

## 2023-09-25 PROBLEM — O09.93 SUPERVISION OF HIGH RISK PREGNANCY IN THIRD TRIMESTER: Status: RESOLVED | Noted: 2022-07-07 | Resolved: 2023-09-25

## 2023-09-25 LAB
ERYTHROCYTE [DISTWIDTH] IN BLOOD BY AUTOMATED COUNT: 11.8 % (ref 10–15)
HCT VFR BLD AUTO: 40.9 % (ref 35–47)
HGB BLD-MCNC: 14 G/DL (ref 11.7–15.7)
MCH RBC QN AUTO: 31 PG (ref 26.5–33)
MCHC RBC AUTO-ENTMCNC: 34.2 G/DL (ref 31.5–36.5)
MCV RBC AUTO: 91 FL (ref 78–100)
PLATELET # BLD AUTO: 250 10E3/UL (ref 150–450)
RBC # BLD AUTO: 4.52 10E6/UL (ref 3.8–5.2)
TSH SERPL DL<=0.005 MIU/L-ACNC: 1.46 UIU/ML (ref 0.3–4.2)
WBC # BLD AUTO: 7.1 10E3/UL (ref 4–11)

## 2023-09-25 PROCEDURE — 99215 OFFICE O/P EST HI 40 MIN: CPT | Performed by: ADVANCED PRACTICE MIDWIFE

## 2023-09-25 PROCEDURE — 84443 ASSAY THYROID STIM HORMONE: CPT | Performed by: ADVANCED PRACTICE MIDWIFE

## 2023-09-25 PROCEDURE — 85027 COMPLETE CBC AUTOMATED: CPT | Performed by: ADVANCED PRACTICE MIDWIFE

## 2023-09-25 PROCEDURE — 36415 COLL VENOUS BLD VENIPUNCTURE: CPT | Performed by: ADVANCED PRACTICE MIDWIFE

## 2023-09-25 ASSESSMENT — ANXIETY QUESTIONNAIRES
3. WORRYING TOO MUCH ABOUT DIFFERENT THINGS: SEVERAL DAYS
7. FEELING AFRAID AS IF SOMETHING AWFUL MIGHT HAPPEN: NEARLY EVERY DAY
2. NOT BEING ABLE TO STOP OR CONTROL WORRYING: MORE THAN HALF THE DAYS
IF YOU CHECKED OFF ANY PROBLEMS ON THIS QUESTIONNAIRE, HOW DIFFICULT HAVE THESE PROBLEMS MADE IT FOR YOU TO DO YOUR WORK, TAKE CARE OF THINGS AT HOME, OR GET ALONG WITH OTHER PEOPLE: SOMEWHAT DIFFICULT
5. BEING SO RESTLESS THAT IT IS HARD TO SIT STILL: MORE THAN HALF THE DAYS
6. BECOMING EASILY ANNOYED OR IRRITABLE: NEARLY EVERY DAY
GAD7 TOTAL SCORE: 14
GAD7 TOTAL SCORE: 14
1. FEELING NERVOUS, ANXIOUS, OR ON EDGE: MORE THAN HALF THE DAYS
4. TROUBLE RELAXING: SEVERAL DAYS

## 2023-09-25 ASSESSMENT — EDINBURGH POSTNATAL DEPRESSION SCALE (EPDS)
I HAVE BEEN SO UNHAPPY THAT I HAVE HAD DIFFICULTY SLEEPING: NOT VERY OFTEN
I HAVE BLAMED MYSELF UNNECESSARILY WHEN THINGS WENT WRONG: YES, MOST OF THE TIME
I HAVE BEEN ANXIOUS OR WORRIED FOR NO GOOD REASON: YES, VERY OFTEN
I HAVE FELT SCARED OR PANICKY FOR NO GOOD REASON: YES, SOMETIMES
TOTAL SCORE: 15
I HAVE FELT SAD OR MISERABLE: NOT VERY OFTEN
I HAVE BEEN SO UNHAPPY THAT I HAVE BEEN CRYING: ONLY OCCASIONALLY
THINGS HAVE BEEN GETTING ON TOP OF ME: YES, SOMETIMES I HAVEN'T BEEN COPING AS WELL AS USUAL
I HAVE LOOKED FORWARD WITH ENJOYMENT TO THINGS: RATHER LESS THAN I USED TO
I HAVE BEEN ABLE TO LAUGH AND SEE THE FUNNY SIDE OF THINGS: NOT QUITE SO MUCH NOW
THE THOUGHT OF HARMING MYSELF HAS OCCURRED TO ME: NEVER

## 2023-09-25 ASSESSMENT — PATIENT HEALTH QUESTIONNAIRE - PHQ9: SUM OF ALL RESPONSES TO PHQ QUESTIONS 1-9: 15

## 2023-09-25 NOTE — PATIENT INSTRUCTIONS
Crisis Resources:  National Suicide Prevention Lifeline at 5-055-923-BPJF (0027)  Throughout  [place]: call **CRISIS (**804590)  Crisis Text Line: is available for free, 24/7 by texting [place] to 826805
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
## 2023-09-27 ENCOUNTER — TELEPHONE (OUTPATIENT)
Dept: FAMILY MEDICINE | Facility: CLINIC | Age: 23
End: 2023-09-27
Payer: COMMERCIAL

## 2023-09-27 NOTE — TELEPHONE ENCOUNTER
Patient Quality Outreach    Patient is due for the following:   Depression  -  Depression follow-up visit    Next Steps:   Patient has upcoming appointment, these items will be addressed at that time.    Type of outreach:    Chart review performed, no outreach needed.    Next Steps:  Reach out within 90 days via  none .    Max number of attempts reached: No. Will try again in 90 days if patient still on fail list.    Questions for provider review:    None           Annmarie Anguiano MA

## 2023-10-19 DIAGNOSIS — F33.1 MAJOR DEPRESSIVE DISORDER, RECURRENT, MODERATE (H): ICD-10-CM

## 2023-10-19 DIAGNOSIS — F43.23 ADJUSTMENT DISORDER WITH MIXED ANXIETY AND DEPRESSED MOOD: ICD-10-CM

## 2023-10-19 NOTE — TELEPHONE ENCOUNTER
PHONE NOTE: Patient called requesting refill prescription.  This CNM called patient to see how she is doing and if she has scheduled an appointment with psych provider.  Gave refill for 1 month.  Called patient and left a message saying I would refill the prescription that she requested for 1 month but that I wanted to discuss with her and see how she is doing.  Left message for her to call the midwife on-call back.  We will check in with patient again today or tomorrow if possible.

## 2023-11-02 DIAGNOSIS — F33.1 MAJOR DEPRESSIVE DISORDER, RECURRENT, MODERATE (H): ICD-10-CM

## 2023-11-02 DIAGNOSIS — F43.23 ADJUSTMENT DISORDER WITH MIXED ANXIETY AND DEPRESSED MOOD: ICD-10-CM

## 2023-11-02 NOTE — TELEPHONE ENCOUNTER
Incoming faxed 90-day supply refill request received from [facility] in [place], [place].  Medication requested:    Pending Prescriptions:                       Disp   Refills    sertraline (ZOLOFT) 50 MG tablet          90 tab*0            Sig: Take 1 tablet (50 mg) by mouth daily    Medication last prescribed: 10/19/2023 (1 month supply)  Last visit with [facility] CNM group: 9/25/2023  Upcoming appointment(s): None    90-day supply request sent to on-call CNM for review.

## 2023-11-07 ENCOUNTER — TELEPHONE (OUTPATIENT)
Dept: MIDWIFE SERVICES | Facility: CLINIC | Age: 23
End: 2023-11-07
Payer: COMMERCIAL

## 2023-11-07 DIAGNOSIS — F43.23 ADJUSTMENT DISORDER WITH MIXED ANXIETY AND DEPRESSED MOOD: Primary | ICD-10-CM

## 2023-11-07 DIAGNOSIS — F43.10 POSTTRAUMATIC STRESS DISORDER: ICD-10-CM

## 2023-11-07 DIAGNOSIS — Z91.51 HISTORY OF SUICIDE ATTEMPT: ICD-10-CM

## 2023-11-07 DIAGNOSIS — F33.1 MAJOR DEPRESSIVE DISORDER, RECURRENT, MODERATE (H): ICD-10-CM

## 2023-11-07 NOTE — TELEPHONE ENCOUNTER
"Phone call to Cassidy to discuss her refill request for Sertraline 50mg. Had seen this writer 9/25/23 and started Sertraline. Referral was also placed for therapy and psychiatry due to patient's history of mental illness including suicide attempt and PTSD. At this time, she states she is feeling \"better\" after initiating Sertraline, but has yet to schedule with psychiatry or therapy. Is wanting to schedule with both, but has not had the time. Accepts new referrals for these and is aware someone will call her to schedule. In the interim a 2 wk refill of Sertraline was approved and pt was encouraged to consider scheduling a follow-up visit with CNM for mood reassessment and medication check in the case that she continues to face barriers getting into psychiatry   "

## 2023-11-24 ENCOUNTER — APPOINTMENT (OUTPATIENT)
Dept: URBAN - METROPOLITAN AREA CLINIC 252 | Age: 23
Setting detail: DERMATOLOGY
End: 2023-11-24

## 2023-11-24 VITALS — HEIGHT: 65 IN | WEIGHT: 203 LBS

## 2023-11-24 DIAGNOSIS — L259 CONTACT DERMATITIS AND OTHER ECZEMA, UNSPECIFIED CAUSE: ICD-10-CM

## 2023-11-24 PROBLEM — L23.9 ALLERGIC CONTACT DERMATITIS, UNSPECIFIED CAUSE: Status: ACTIVE | Noted: 2023-11-24

## 2023-11-24 PROCEDURE — 99203 OFFICE O/P NEW LOW 30 MIN: CPT | Mod: 25

## 2023-11-24 PROCEDURE — OTHER PRESCRIPTION: OTHER

## 2023-11-24 PROCEDURE — OTHER INTRAMUSCULAR KENALOG: OTHER

## 2023-11-24 PROCEDURE — OTHER COUNSELING: OTHER

## 2023-11-24 RX ORDER — HYDROXYZINE HYDROCHLORIDE 25 MG/1
25MG TABLET, FILM COATED ORAL QHS
Qty: 30 | Refills: 0 | Status: ERX | COMMUNITY
Start: 2023-11-24

## 2023-11-24 ASSESSMENT — LOCATION SIMPLE DESCRIPTION DERM
LOCATION SIMPLE: RIGHT BREAST
LOCATION SIMPLE: LEFT UPPER ARM
LOCATION SIMPLE: LEFT BREAST
LOCATION SIMPLE: LEFT CHEEK
LOCATION SIMPLE: RIGHT UPPER ARM
LOCATION SIMPLE: RIGHT BUTTOCK

## 2023-11-24 ASSESSMENT — LOCATION ZONE DERM
LOCATION ZONE: TRUNK
LOCATION ZONE: FACE
LOCATION ZONE: ARM

## 2023-11-24 ASSESSMENT — LOCATION DETAILED DESCRIPTION DERM
LOCATION DETAILED: RIGHT BUTTOCK
LOCATION DETAILED: LEFT LATERAL MALAR CHEEK
LOCATION DETAILED: LEFT AREOLA
LOCATION DETAILED: RIGHT NIPPLE
LOCATION DETAILED: RIGHT ANTERIOR DISTAL UPPER ARM
LOCATION DETAILED: LEFT ANTERIOR DISTAL UPPER ARM

## 2023-11-24 NOTE — HPI: RASH
Is This A New Presentation, Or A Follow-Up?: Rash
Additional History: Uses dove aramis free soap liquid cleanser she thinks. Does not lopez moisturizer. Uses powder scented detergent. No fabric softener, uses bounce fragranted drier sheet. Started triamcinolone 0.1% ointment. This affects her sleep.  Use to have  hard water but now has soft water.

## 2023-11-24 NOTE — PROCEDURE: INTRAMUSCULAR KENALOG
Per Mary Carmen patient needs to talk the Coreg and Stop the amlopidine.  I called the patient and left a message Expiration Date (Optional): 11/2024

## 2023-11-24 NOTE — PROCEDURE: COUNSELING
Detail Level: Zone
Patient Specific Counseling (Will Not Stick From Patient To Patient): - Avoid scented products.\\n- Recommended fragrance-free soaps, laundry detergent, etc.\\n- Samples of Vanicream lotion and sunscreen given, along with a coupon.\\n- Recommended use of Vanicream shampoo, conditioner, and body wash.\\n- Discussed treatment option of an intramuscular injection of kenalog.\\n- Recommended holding off on use of the Triamcinolone at this time.\\n- If no improvement with intramuscular kenalog injection, would Betamethasone 0.05% ointment for body and Triamcinolone 0.025% ointment for face.\\n- Patch testing discussed; recommended waiting at least 2 weeks post intramuscular kenalog injection to do patch testing.

## 2024-01-08 ENCOUNTER — TELEPHONE (OUTPATIENT)
Dept: FAMILY MEDICINE | Facility: CLINIC | Age: 24
End: 2024-01-08
Payer: COMMERCIAL

## 2024-01-31 ENCOUNTER — OFFICE VISIT (OUTPATIENT)
Dept: FAMILY MEDICINE | Facility: CLINIC | Age: 24
End: 2024-01-31
Payer: COMMERCIAL

## 2024-01-31 VITALS
DIASTOLIC BLOOD PRESSURE: 77 MMHG | WEIGHT: 212.8 LBS | OXYGEN SATURATION: 99 % | SYSTOLIC BLOOD PRESSURE: 120 MMHG | HEIGHT: 65 IN | BODY MASS INDEX: 35.45 KG/M2 | TEMPERATURE: 98.4 F | RESPIRATION RATE: 20 BRPM | HEART RATE: 102 BPM

## 2024-01-31 DIAGNOSIS — L30.0 NUMMULAR ECZEMA: Primary | ICD-10-CM

## 2024-01-31 DIAGNOSIS — R35.0 URINARY FREQUENCY: ICD-10-CM

## 2024-01-31 DIAGNOSIS — M67.432 GANGLION CYST OF WRIST, LEFT: ICD-10-CM

## 2024-01-31 DIAGNOSIS — Z30.013 ENCOUNTER FOR INITIAL PRESCRIPTION OF INJECTABLE CONTRACEPTIVE: ICD-10-CM

## 2024-01-31 LAB
ALBUMIN UR-MCNC: NEGATIVE MG/DL
APPEARANCE UR: CLEAR
BACTERIA #/AREA URNS HPF: ABNORMAL /HPF
BILIRUB UR QL STRIP: NEGATIVE
COLOR UR AUTO: YELLOW
GLUCOSE UR STRIP-MCNC: NEGATIVE MG/DL
HCG UR QL: NEGATIVE
HGB UR QL STRIP: ABNORMAL
KETONES UR STRIP-MCNC: NEGATIVE MG/DL
LEUKOCYTE ESTERASE UR QL STRIP: NEGATIVE
NITRATE UR QL: NEGATIVE
PH UR STRIP: 7 [PH] (ref 5–7)
RBC #/AREA URNS AUTO: ABNORMAL /HPF
SP GR UR STRIP: 1.01 (ref 1–1.03)
SQUAMOUS #/AREA URNS AUTO: ABNORMAL /LPF
UROBILINOGEN UR STRIP-ACNC: 0.2 E.U./DL
WBC #/AREA URNS AUTO: ABNORMAL /HPF

## 2024-01-31 PROCEDURE — 81025 URINE PREGNANCY TEST: CPT | Performed by: NURSE PRACTITIONER

## 2024-01-31 PROCEDURE — 87086 URINE CULTURE/COLONY COUNT: CPT | Performed by: NURSE PRACTITIONER

## 2024-01-31 PROCEDURE — 99214 OFFICE O/P EST MOD 30 MIN: CPT | Mod: 25 | Performed by: NURSE PRACTITIONER

## 2024-01-31 PROCEDURE — 96372 THER/PROPH/DIAG INJ SC/IM: CPT | Performed by: NURSE PRACTITIONER

## 2024-01-31 PROCEDURE — 81001 URINALYSIS AUTO W/SCOPE: CPT | Performed by: NURSE PRACTITIONER

## 2024-01-31 RX ORDER — MEDROXYPROGESTERONE ACETATE 150 MG/ML
150 INJECTION, SUSPENSION INTRAMUSCULAR
Status: ACTIVE | OUTPATIENT
Start: 2024-01-31 | End: 2025-01-25

## 2024-01-31 RX ORDER — NITROFURANTOIN 25; 75 MG/1; MG/1
100 CAPSULE ORAL 2 TIMES DAILY
Qty: 10 CAPSULE | Refills: 0 | Status: SHIPPED | OUTPATIENT
Start: 2024-01-31 | End: 2024-07-30

## 2024-01-31 RX ORDER — HALOBETASOL PROPIONATE 0.05 %
OINTMENT (GRAM) TOPICAL
Qty: 150 G | Refills: 0 | Status: SHIPPED | OUTPATIENT
Start: 2024-01-31

## 2024-01-31 RX ORDER — NITROFURANTOIN 25; 75 MG/1; MG/1
100 CAPSULE ORAL 2 TIMES DAILY
Qty: 10 CAPSULE | Refills: 0 | Status: SHIPPED | OUTPATIENT
Start: 2024-01-31 | End: 2024-01-31

## 2024-01-31 RX ORDER — HALOBETASOL PROPIONATE 0.05 %
OINTMENT (GRAM) TOPICAL
Qty: 150 G | Refills: 0 | Status: SHIPPED | OUTPATIENT
Start: 2024-01-31 | End: 2024-01-31

## 2024-01-31 RX ADMIN — MEDROXYPROGESTERONE ACETATE 150 MG: 150 INJECTION, SUSPENSION INTRAMUSCULAR at 17:02

## 2024-01-31 ASSESSMENT — PAIN SCALES - GENERAL: PAINLEVEL: NO PAIN (0)

## 2024-01-31 ASSESSMENT — PATIENT HEALTH QUESTIONNAIRE - PHQ9
SUM OF ALL RESPONSES TO PHQ QUESTIONS 1-9: 7
10. IF YOU CHECKED OFF ANY PROBLEMS, HOW DIFFICULT HAVE THESE PROBLEMS MADE IT FOR YOU TO DO YOUR WORK, TAKE CARE OF THINGS AT HOME, OR GET ALONG WITH OTHER PEOPLE: SOMEWHAT DIFFICULT
SUM OF ALL RESPONSES TO PHQ QUESTIONS 1-9: 7

## 2024-01-31 ASSESSMENT — ASTHMA QUESTIONNAIRES

## 2024-01-31 NOTE — PROGRESS NOTES
Assessment & Plan     Nummular eczema  -Rash appears consistent with nummular eczema. Will try high potency topical corticosteroid along with continuing Cerave or Vanicream. Avoid long, hot showers.   - halobetasol (ULTRAVATE) 0.05 % ointment; Apply topically 2 times a day for 14 days, then once a day for 14 days.    Ganglion cyst of wrist, left  -since it has been recurring since 2018 and still causing pain will refer to hand specialist.   - Orthopedic  Referral; Future  - Wrist/Arm/Hand Bracking Supplies Order Wrist Brace; Left; non-thumb spica    Urinary frequency  Increase fluid intake and empty your bladder a minimum of every two hours. Alcohol, caffeine, citrus, and carbonated beverages are bladder irritants and can worsen symptoms of urinary frequency and burning. Always wipe from front to back after using the bathroom. Urinated before and after sexual activity. Go to the emergency room if severe pelvic, abdominal, or back pain, or if you develop a fever greater than 100.4 F as these could be signs of of a kidney infection.    - UA Macroscopic with reflex to Microscopic and Culture  - Urine Culture Aerobic Bacterial - lab collect; Future  - Urine Culture Aerobic Bacterial - lab collect  - UA Microscopic with Reflex to Culture  - nitroFURantoin macrocrystal-monohydrate (MACROBID) 100 MG capsule; Take 1 capsule (100 mg) by mouth 2 times daily    Encounter for initial prescription of injectable contraceptive  -discussed risks/benefits of depo provera. After discussion patient indicated she would like to proceed with depo injection today.   - HCG qualitative urine; Future  - HCG qualitative urine  - medroxyPROGESTERone (DEPO-PROVERA) injection 150 mg    Ordering of each unique test  Prescription drug management  I spent a total of 31 minutes on the day of the visit.   Time spent by me doing chart review, history and exam, documentation and further activities per the note      BMI  Estimated body mass  "index is 35.41 kg/m  as calculated from the following:    Height as of this encounter: 1.651 m (5' 5\").    Weight as of this encounter: 96.5 kg (212 lb 12.8 oz).   Weight management plan: Discussed healthy diet and exercise guidelines        Subjective   Cassidy is a 23 year old, presenting for the following health issues:  Derm Problem and Wrist Pain        1/31/2024     3:44 PM   Additional Questions   Roomed by Divina NORTON   Accompanied by self     1. Left arm rash- notice 6 months ago. Intensely Tried multiple creams. Annular. Tried cerave, Vanicream. Eliminating fragrances. Saw a dermatologist in [place] who said it was dermatitis and recommended eliminating any products with fragrances however symptoms persisted. Tried triamcinolone which helps only briefly.    2. Cyst on left wrist keeps coming back and is painful. Wore a wrist splint for a year after it first appeared in 2018, it got better but keeps coming back.     3. Feels like she has the beginnings of UTI. Lower stomach hurting. Urinated 7 times last night. Pain with urination.     4. Birth control: has struggled with moodiness when on it before. Is currently doing tracking/natural family planning. Mirena made her more cramp and her periods longer. Is interested in depo.    No history of migraines with aura, stroke, heart disease, smoking, blood clotting issues, or SLE.         History of Present Illness       Reason for visit:  Wrist pain, rash, and posibly birth control  Symptom onset:  3-7 days ago  Symptoms include:  Tightness, sharp pain and slight swelling  Symptom intensity:  Severe  Symptom progression:  Improving  Had these symptoms before:  Yes  Has tried/received treatment for these symptoms:  No  What makes it worse:  Lifting or applying pressure  What makes it better:  No brittany tried icy hot type creams and ibprophen    She eats 2-3 servings of fruits and vegetables daily.She consumes 0 sweetened beverage(s) daily.She exercises with enough effort " "to increase her heart rate 10 to 19 minutes per day.  She exercises with enough effort to increase her heart rate 3 or less days per week. She is missing 7 dose(s) of medications per week.     Wrist issue- left wrist- a couple of years                Review of Systems  Constitutional, neuro, ENT, endocrine, pulmonary, cardiac, gastrointestinal, genitourinary, musculoskeletal, integument and psychiatric systems are negative, except as otherwise noted.      Objective    /77   Pulse 102   Temp 98.4  F (36.9  C) (Tympanic)   Resp 20   Ht 1.651 m (5' 5\")   Wt 96.5 kg (212 lb 12.8 oz)   SpO2 99%   BMI 35.41 kg/m    Body mass index is 35.41 kg/m .  Physical Exam  Constitutional:       Appearance: Normal appearance. She is not ill-appearing.   Cardiovascular:      Rate and Rhythm: Normal rate and regular rhythm.      Pulses: Normal pulses.      Heart sounds: Normal heart sounds. No murmur heard.     No friction rub. No gallop.   Pulmonary:      Effort: Pulmonary effort is normal.      Breath sounds: Normal breath sounds.   Abdominal:      General: Abdomen is flat. Bowel sounds are normal.      Palpations: Abdomen is soft.      Tenderness: There is no abdominal tenderness. There is no right CVA tenderness, left CVA tenderness or guarding.   Musculoskeletal:      Left hand: Swelling and tenderness present.        Hands:       Cervical back: Normal range of motion and neck supple.   Skin:     General: Skin is warm and dry.      Findings: Rash present.      Comments: Multiple erythematous discoid lesions on arms, some excoriation, no crusting or drainage.    Neurological:      Mental Status: She is alert.   Psychiatric:         Attention and Perception: Attention and perception normal.         Mood and Affect: Mood and affect normal.         Speech: Speech normal.         Behavior: Behavior normal. Behavior is cooperative.         Thought Content: Thought content normal.         Cognition and Memory: Cognition and " memory normal.         Judgment: Judgment normal.          Results for orders placed or performed in visit on 01/31/24 (from the past 24 hour(s))   UA Macroscopic with reflex to Microscopic and Culture    Specimen: Urine, Midstream   Result Value Ref Range    Color Urine Yellow Colorless, Straw, Light Yellow, Yellow    Appearance Urine Clear Clear    Glucose Urine Negative Negative mg/dL    Bilirubin Urine Negative Negative    Ketones Urine Negative Negative mg/dL    Specific Gravity Urine 1.015 1.003 - 1.035    Blood Urine Trace (A) Negative    pH Urine 7.0 5.0 - 7.0    Protein Albumin Urine Negative Negative mg/dL    Urobilinogen Urine 0.2 0.2, 1.0 E.U./dL    Nitrite Urine Negative Negative    Leukocyte Esterase Urine Negative Negative   HCG qualitative urine   Result Value Ref Range    hCG Urine Qualitative Negative Negative   UA Microscopic with Reflex to Culture   Result Value Ref Range    Bacteria Urine Few (A) None Seen /HPF    RBC Urine 0-2 0-2 /HPF /HPF    WBC Urine None Seen 0-5 /HPF /HPF    Squamous Epithelials Urine Few (A) None Seen /LPF    Narrative    Urine Culture not indicated           Signed Electronically by: MARY Benoit CNP

## 2024-01-31 NOTE — NURSING NOTE

## 2024-02-02 LAB — BACTERIA UR CULT: NORMAL

## 2024-02-09 ENCOUNTER — ANCILLARY PROCEDURE (OUTPATIENT)
Dept: GENERAL RADIOLOGY | Facility: CLINIC | Age: 24
End: 2024-02-09
Attending: PEDIATRICS
Payer: COMMERCIAL

## 2024-02-09 ENCOUNTER — OFFICE VISIT (OUTPATIENT)
Dept: ORTHOPEDICS | Facility: CLINIC | Age: 24
End: 2024-02-09
Attending: NURSE PRACTITIONER
Payer: COMMERCIAL

## 2024-02-09 VITALS — BODY MASS INDEX: 35.32 KG/M2 | HEIGHT: 65 IN | WEIGHT: 212 LBS

## 2024-02-09 DIAGNOSIS — M67.432 GANGLION CYST OF WRIST, LEFT: ICD-10-CM

## 2024-02-09 DIAGNOSIS — M25.532 CHRONIC PAIN OF LEFT WRIST: Primary | ICD-10-CM

## 2024-02-09 DIAGNOSIS — G89.29 CHRONIC PAIN OF LEFT WRIST: Primary | ICD-10-CM

## 2024-02-09 PROCEDURE — 73110 X-RAY EXAM OF WRIST: CPT | Mod: TC | Performed by: RADIOLOGY

## 2024-02-09 PROCEDURE — 99213 OFFICE O/P EST LOW 20 MIN: CPT | Performed by: PEDIATRICS

## 2024-02-09 NOTE — PROGRESS NOTES
ASSESSMENT & PLAN    Cassidy was seen today for pain.    Diagnoses and all orders for this visit:    Chronic pain of left wrist  -     MR Wrist Left w/o Contrast; Future    Ganglion cyst of wrist, left  -     XR Wrist Left G/E 3 Views; Future  -     Orthopedic  Referral      She is inclined to pursue excision for cyst, given when symptomatic, it is quite limiting and painful. However, has improved, minimal symptoms today.  She also prefers to pursue additional imaging for evaluation for confirmation of cyst, prior to considering excision.  Questions answered. Discussed signs and symptoms that may indicate more serious issues; the patient was instructed to seek appropriate care if noted. Cassidy indicates understanding of these issues and agrees with the plan.    See Patient Instructions  Patient Instructions   Clinically, this is most consistent with a dorsal wrist ganglion cyst, with waxing and waning symptoms.  Good to see that symptoms are minimal currently.  With flares, would typically suggest icing, over-the-counter medication, and bracing, which you have done.  Otherwise, we discussed considerations around additional imaging with MRI (for confirmation), trial of aspiration with or without injection (with use of imaging guidance, such as with ultrasound), and potential referral on to discuss further with orthopedic surgery.  Given looking for confirmation of presumed cyst, plan MRI of the left wrist next.    Advanced imaging is done by appointment. Please call [facility] ([facility]) [phone] , [facility] ([facility]/[place]) [phone] , or [facility] ([facility]) [phone]  to schedule your MRI.     Some insurance companies may require a prior authorization to be completed which can delay the time until you are able to schedule your appointment.       If you are active on [employer], you may have access to  "your test results before your provider is able to review the study and advise on next steps.      The clinic will plan to contact you with results either via phone or [employer]. If you have not heard from the clinic within 2-3 days following your MRI, please contact us at [phone] or via [employer].  Alternatively, if interested in further discussion with me about MRI findings and next steps, feel free to schedule a telephone visit, video visit, or recheck appointment in clinic.        If you have any further questions for your physician or physician s care team you can contact them thru "[employer]t or by calling [phone].      Leodan Diamond [facility]      CC: Susu Rowell      -----  Chief Complaint   Patient presents with    Left Wrist - Pain       SUBJECTIVE  Cassidy Fernandes is a/an 23 year old female who is seen in consultation at the request of  Susu Rowell C.N.P. for evaluation of left wrist.     The patient is seen by themselves.  The patient is Right handed    Onset: many years(s) ago, noting 2018. Reports insidious onset without acute precipitating event. Every now and then her wrist gets swollen and the \"lump\" on the back of the wrist gets larger and decreases ROM and increases pain. States that the wrist \"calms down\" after about 10 days, 1 week to 1.5 weeks.  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 Does note that she holds her child in the left arm when she does house things.      **  Above information per rooming staff.  Additional history:  Currently no significant symptoms. However, when pain increased, difficult to use left wrist.  Has focal swelling over dorsal wrist. When more " "inflamed, is more prominent in size.  No noted joint noise.  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 Recalls that is the last time wrist was ok, prior to that.      REVIEW OF SYSTEMS:  Review of Systems    OBJECTIVE:  Ht 1.651 m (5' 5\")   Wt 96.2 kg (212 lb)   BMI 35.28 kg/m           Hand/wrist (left):    Inspection:  Very slight dorsal focal prominence at rest, which becomes more prominent with flexion.  No other swelling. No ecchymosis.    Motion:  Forearm pronation , forearm supination full.  Wrist flexion   Wrist extension   Wrist radial deviation   Wrist ulnar deviation   Digit motion   All full  Dorsal wrist focal prominence with flexion  Some discomfort end of full extension    Strength:  Grossly intact wrist, digits    Sensation:  Grossly intact light touch.    Radial pulses normal, +2/4, capillary refill brisk.    Palpation:  Tender mild dorsal wrist, area of prominence; this is mobile  Nontender remainder          RADIOLOGY:  Final results and radiologist's interpretation, available in the [facility] health record.  Images were reviewed with the patient in the office today.  My personal interpretation of the performed imaging: no acute bony abnormality noted.      XR Wrist Left G/E 3 Views    Narrative    EXAM: XR WRIST LEFT G/E 3 VIEWS  LOCATION: [facility] [place]  DATE: 2/9/2024    INDICATION: suspect ganglion cyst on dorsal wrist  COMPARISON: None.      Impression    IMPRESSION: Normal joint spaces and alignment. No fracture. No abnormal soft tissue calcification.                  "

## 2024-02-09 NOTE — PATIENT INSTRUCTIONS
Clinically, this is most consistent with a dorsal wrist ganglion cyst, with waxing and waning symptoms.  Good to see that symptoms are minimal currently.  With flares, would typically suggest icing, over-the-counter medication, and bracing, which you have done.  Otherwise, we discussed considerations around additional imaging with MRI (for confirmation), trial of aspiration with or without injection (with use of imaging guidance, such as with ultrasound), and potential referral on to discuss further with orthopedic surgery.  Given looking for confirmation of presumed cyst, plan MRI of the left wrist next.    Advanced imaging is done by appointment. Please call [facility] ([facility]) [phone] , [facility] ([facility]/[place]) [phone] , or [facility] ([facility]) [phone]  to schedule your MRI.     Some insurance companies may require a prior authorization to be completed which can delay the time until you are able to schedule your appointment.       If you are active on [employer], you may have access to your test results before your provider is able to review the study and advise on next steps.      The clinic will plan to contact you with results either via phone or [employer]. If you have not heard from the clinic within 2-3 days following your MRI, please contact us at [phone] or via [employer].  Alternatively, if interested in further discussion with me about MRI findings and next steps, feel free to schedule a telephone visit, video visit, or recheck appointment in clinic.        If you have any further questions for your physician or physician s care team you can contact them thru [employer] or by calling [phone].

## 2024-02-09 NOTE — LETTER
2/9/2024         RE: Cassidy Fernandes  [address] [place] [address]        Dear Colleague,    Thank you for referring your patient, Cassidy Fernandes, to the [facility]. Please see a copy of my visit note below.    ASSESSMENT & PLAN    Cassidy was seen today for pain.    Diagnoses and all orders for this visit:    Chronic pain of left wrist  -     MR Wrist Left w/o Contrast; Future    Ganglion cyst of wrist, left  -     XR Wrist Left G/E 3 Views; Future  -     Orthopedic  Referral      She is inclined to pursue excision for cyst, given when symptomatic, it is quite limiting and painful. However, has improved, minimal symptoms today.  She also prefers to pursue additional imaging for evaluation for confirmation of cyst, prior to considering excision.  Questions answered. Discussed signs and symptoms that may indicate more serious issues; the patient was instructed to seek appropriate care if noted. Cassidy indicates understanding of these issues and agrees with the plan.    See Patient Instructions  Patient Instructions   Clinically, this is most consistent with a dorsal wrist ganglion cyst, with waxing and waning symptoms.  Good to see that symptoms are minimal currently.  With flares, would typically suggest icing, over-the-counter medication, and bracing, which you have done.  Otherwise, we discussed considerations around additional imaging with MRI (for confirmation), trial of aspiration with or without injection (with use of imaging guidance, such as with ultrasound), and potential referral on to discuss further with orthopedic surgery.  Given looking for confirmation of presumed cyst, plan MRI of the left wrist next.    Advanced imaging is done by appointment. Please call [facility] ([facility]/[place]) [phone] , [facility] ([facility]) [phone] , or [facility]  "([place]/[facility]) [phone]  to schedule your MRI.     Some insurance companies may require a prior authorization to be completed which can delay the time until you are able to schedule your appointment.       If you are active on [employer] in 2014), you may have access to your test results before your provider is able to review the study and advise on next steps.      The clinic will plan to contact you with results either via phone or [employer]. If you have not heard from the clinic within 2-3 days following your MRI, please contact us at [phone] or via [employer] ([employer] in 2014).  Alternatively, if interested in further discussion with me about MRI findings and next steps, feel free to schedule a telephone visit, video visit, or recheck appointment in clinic.        If you have any further questions for your physician or physician s care team you can contact them thru [employer] ([employer] in 2014) or by calling [phone].      Leodan Diamond [facility]      CC: Susu Rowell      -----  Chief Complaint   Patient presents with     Left Wrist - Pain       SUBJECTIVE  Cassidy Fernandes is a/an 23 year old female who is seen in consultation at the request of  Susu Rowell C.N.P. for evaluation of left wrist.     The patient is seen by themselves.  The patient is Right handed    Onset: many years(s) ago, noting 2018. Reports insidious onset without acute precipitating event. Every now and then her wrist gets swollen and the \"lump\" on the back of the wrist gets larger and decreases ROM and increases pain. States that the wrist \"calms down\" after about 10 days, 1 week to 1.5 weeks.  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 Does " "note that she holds her child in the left arm when she does house things.      **  Above information per rooming staff.  Additional history:  Currently no significant symptoms. However, when pain increased, difficult to use left wrist.  Has focal swelling over dorsal wrist. When more inflamed, is more prominent in size.  No noted joint noise.  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 Recalls that is the last time wrist was ok, prior to that.      REVIEW OF SYSTEMS:  Review of Systems    OBJECTIVE:  Ht 1.651 m (5' 5\")   Wt 96.2 kg (212 lb)   BMI 35.28 kg/m           Hand/wrist (left):    Inspection:  Very slight dorsal focal prominence at rest, which becomes more prominent with flexion.  No other swelling. No ecchymosis.    Motion:  Forearm pronation , forearm supination full.  Wrist flexion   Wrist extension   Wrist radial deviation   Wrist ulnar deviation   Digit motion   All full  Dorsal wrist focal prominence with flexion  Some discomfort end of full extension    Strength:  Grossly intact wrist, digits    Sensation:  Grossly intact light touch.    Radial pulses normal, +2/4, capillary refill brisk.    Palpation:  Tender mild dorsal wrist, area of prominence; this is mobile  Nontender remainder          RADIOLOGY:  Final results and radiologist's interpretation, available in the [facility] health record.  Images were reviewed with the patient in the office today.  My personal interpretation of the performed imaging: no acute bony abnormality noted.      XR Wrist Left G/E 3 Views    Narrative    EXAM: XR WRIST LEFT G/E 3 VIEWS  LOCATION: [facility] [place]  DATE: 2/9/2024    INDICATION: suspect ganglion cyst on dorsal wrist  COMPARISON: None.      Impression    IMPRESSION: Normal joint spaces and alignment. No fracture. No abnormal soft tissue calcification.                      Again, thank you for allowing me to participate in the care of your patient.  "       Sincerely,        Leodan Diamond, DO

## 2024-02-15 ENCOUNTER — OFFICE VISIT (OUTPATIENT)
Dept: DERMATOLOGY | Facility: CLINIC | Age: 24
End: 2024-02-15
Payer: COMMERCIAL

## 2024-02-15 DIAGNOSIS — L90.6 STRIAE: ICD-10-CM

## 2024-02-15 DIAGNOSIS — L20.81 ATOPIC NEURODERMATITIS: Primary | ICD-10-CM

## 2024-02-15 PROCEDURE — 99203 OFFICE O/P NEW LOW 30 MIN: CPT | Performed by: PHYSICIAN ASSISTANT

## 2024-02-15 RX ORDER — TRETINOIN 0.25 MG/G
CREAM TOPICAL
Qty: 45 G | Refills: 11 | Status: SHIPPED | OUTPATIENT
Start: 2024-02-15 | End: 2024-07-30

## 2024-02-15 ASSESSMENT — PAIN SCALES - GENERAL: PAINLEVEL: NO PAIN (0)

## 2024-02-15 NOTE — LETTER
2/15/2024         RE: Cassidy Fernandes  [address] [address] [address]        Dear Colleague,    Thank you for referring your patient, Cassidy Fernandes, to the [facility]. Please see a copy of my visit note below.    HPI:   Chief complaints: Cassidy Fernandes is a pleasant 23 year old female who presents for evaluation of eczema. She has a history of eczema in childhood but was well controlled until 1 year ago. She will get large itchy flaky patches on her arms and legs. She was using moisturizer but this did not help. Her PCP recently gave her halobetasol cream which has cleared the areas. She is using ivory soap to bathe and CeraVe after; she bathes 2-3 times weekly.   Additionally she has stretch marks on her abdomen and would like to know if there are options to treat these    PHYSICAL EXAM:    There were no vitals taken for this visit.  Skin exam performed as follows: Type 2 skin. Mood appropriate  Alert and Oriented X 3. Well developed, well nourished in no distress.  General appearance: Normal  Head including face: Normal  Eyes: conjunctiva and lids: Normal  Mouth: Lips, teeth, gums: Normal  Neck: Normal  Skin: Scalp and body hair: See below    PIH on the arms with xerosis  Striae on the abdomen    ASSESSMENT/PLAN:     Atopic neurodermatitis - discussed diagnosis and treatment options  --Bathe daily  --Switch to Dove  --Halobetasol as needed  --Thick emollients daily  --Vaseline for face    Striae   --Start tretinoin 0.025% cream daily. Discussed potential for dryness/irritation. Advised to use emollients if needed.              Follow-up: yearly/PRN sooner  CC:   Scribed By: Jaki David, MS, PAKENNA      Again, thank you for allowing me to participate in the care of your patient.        Sincerely,        Jaki David PA-C

## 2024-02-15 NOTE — PROGRESS NOTES
HPI:   Chief complaints: Cassidy Fernandes is a pleasant 23 year old female who presents for evaluation of eczema. She has a history of eczema in childhood but was well controlled until 1 year ago. She will get large itchy flaky patches on her arms and legs. She was using moisturizer but this did not help. Her PCP recently gave her halobetasol cream which has cleared the areas. She is using ivory soap to bathe and CeraVe after; she bathes 2-3 times weekly.   Additionally she has stretch marks on her abdomen and would like to know if there are options to treat these    PHYSICAL EXAM:    There were no vitals taken for this visit.  Skin exam performed as follows: Type 2 skin. Mood appropriate  Alert and Oriented X 3. Well developed, well nourished in no distress.  General appearance: Normal  Head including face: Normal  Eyes: conjunctiva and lids: Normal  Mouth: Lips, teeth, gums: Normal  Neck: Normal  Skin: Scalp and body hair: See below    PIH on the arms with xerosis  Striae on the abdomen    ASSESSMENT/PLAN:     Atopic neurodermatitis - discussed diagnosis and treatment options  --Bathe daily  --Switch to Dove  --Halobetasol as needed  --Thick emollients daily  --Vaseline for face    Striae   --Start tretinoin 0.025% cream daily. Discussed potential for dryness/irritation. Advised to use emollients if needed.              Follow-up: yearly/PRN sooner  CC:   Scribed By: Jaki David, MS, PALeandroC

## 2024-02-15 NOTE — NURSING NOTE
Cassidy Fernandes's chief complaint for this visit includes:  Chief Complaint   Patient presents with    Derm Problem     Patient is here to rule eczema. Patient will get dry red itching and burning [place] patches throughout her body.      PCP: Susu Rowell    Referring Provider:  Referred Self, MD  No address on file    There were no vitals taken for this visit.  No Pain (0)        Allergies   Allergen Reactions    Seasonal Allergies          Do you need any medication refills at today's visit? No    Shannan Solares MA

## 2024-02-15 NOTE — PATIENT INSTRUCTIONS
Directions for eczema (atopic dermatitis)    Bathe every day - we recommend short showers or baths (5 minutes or less) with lukewarm water.  Use a gentle soap such as Dove for sensitive skin, Cetaphil, Vanicream or CeraVe   Wash the  dirty areas  only - this means armpits, groin, buttocks and feet.   After the bath or shower, within 2 minutes:   Pat dry with towel    If prescribed a topical prescription, apply this FIRST to any red/rashy/itchy areas - halobetasol - you can use this twice per day for 1-2 weeks as needed    Apply a moisturizer to entire body, even where you just put the prescription medicine. We recommend CeraVe cream, Cetaphil cream, Eucerin cream, Gold Bond or Vanicream. You will do this every single day. If you don t bathe every day we still recommend an application of body moisturizer.

## 2024-04-08 ENCOUNTER — OFFICE VISIT (OUTPATIENT)
Dept: URGENT CARE | Facility: URGENT CARE | Age: 24
End: 2024-04-08
Payer: COMMERCIAL

## 2024-04-08 VITALS
TEMPERATURE: 98.5 F | RESPIRATION RATE: 18 BRPM | WEIGHT: 207.6 LBS | DIASTOLIC BLOOD PRESSURE: 81 MMHG | SYSTOLIC BLOOD PRESSURE: 127 MMHG | HEART RATE: 83 BPM | OXYGEN SATURATION: 99 % | BODY MASS INDEX: 34.55 KG/M2

## 2024-04-08 DIAGNOSIS — H10.022 PINK EYE, LEFT: Primary | ICD-10-CM

## 2024-04-08 PROCEDURE — 99213 OFFICE O/P EST LOW 20 MIN: CPT | Performed by: NURSE PRACTITIONER

## 2024-04-08 RX ORDER — POLYMYXIN B SULFATE AND TRIMETHOPRIM 1; 10000 MG/ML; [USP'U]/ML
1-2 SOLUTION OPHTHALMIC EVERY 6 HOURS
Qty: 10 ML | Refills: 0 | Status: SHIPPED | OUTPATIENT
Start: 2024-04-08 | End: 2024-04-15

## 2024-04-08 NOTE — LETTER
April 8, 2024      Cassidy Fernandes  [id number] \[facility].\"" [id number]        To Whom It May Concern:    Cassidy Fernandes  was seen on 4/8/24.  Please excuse her until 4/10/24 due to illness.        Sincerely,        MARY Ferguson CNP

## 2024-04-08 NOTE — PROGRESS NOTES
SUBJECTIVE:  Chief Complaint: LEFT EYE    History of Present Illness:  Cassidy Fernandes is a 23 year old female who presents complaining of moderate left eye discharge, mattering, redness since yesterday morning  Onset/timing: sudden.    Associated Signs and Symptoms: cold sx last week  Treatment measures tried include: flushed with water  and warm packs  Contact wearer : No  No blurry vision fb sensation or injury  Pain moderate    Past Medical History:   Diagnosis Date    Contact dermatitis and other eczema, due to unspecified cause     Depressive disorder     Herpes simplex virus (HSV) infection     Migraines     Mononucleosis     with hospitalization    Uncomplicated asthma      Current Outpatient Medications   Medication Sig Dispense Refill    albuterol (PROAIR HFA/PROVENTIL HFA/VENTOLIN HFA) 108 (90 Base) MCG/ACT inhaler Inhale 2 puffs into the lungs every 6 hours as needed for shortness of breath / dyspnea or wheezing . No further refills until follow-up appointment (Patient not taking: Reported on 9/25/2023) 18 g 0    Emollient (CERAVE) CREA Apply to skin once daily 454 g 11    halobetasol (ULTRAVATE) 0.05 % ointment Apply topically 2 times a day for 14 days, then once a day for 14 days. 150 g 0    nitroFURantoin macrocrystal-monohydrate (MACROBID) 100 MG capsule Take 1 capsule (100 mg) by mouth 2 times daily 10 capsule 0    sertraline (ZOLOFT) 50 MG tablet Take 1 tablet (50 mg) by mouth daily 14 tablet 0    tretinoin (RETIN-A) 0.025 % external cream Apply a very thin layer to stretch marks once daily 45 g 11    triamcinolone (KENALOG) 0.1 % external ointment Apply topically 2 times daily 80 g 1    valACYclovir (VALTREX) 1000 mg tablet Take 2 tablets (2,000 mg) by mouth 2 times daily for 1 day 4 tablet 2        ROS:  Review of systems negative except as stated above.    OBJECTIVE:  /81   Pulse 83   Temp 98.5  F (36.9  C) (Tympanic)   Resp 18   Wt 94.2 kg (207 lb 9.6 oz)   SpO2 99%    Breastfeeding No   BMI 34.55 kg/m    General: no acute distress  Eye exam: right eye normal lid, conjunctiva, cornea, pupil and fundus, left eye abnormal findings: conjunctivitis with erythema, discharge and matting noted.    ASSESSMENT:  (H10.022) Pink eye, left  (primary encounter diagnosis)    Plan: polymixin b-trimethoprim (POLYTRIM) 40179-9.1 UNIT/ML-% ophthalmic solution  Warm packs for comfort. Hygiene measures discussed.  Home treat and monitor sx call if new or worsening    MARY Ferguson CNP

## 2024-04-30 ENCOUNTER — ALLIED HEALTH/NURSE VISIT (OUTPATIENT)
Dept: FAMILY MEDICINE | Facility: CLINIC | Age: 24
End: 2024-04-30
Payer: COMMERCIAL

## 2024-04-30 DIAGNOSIS — Z30.9 CONTRACEPTIVE MANAGEMENT: Primary | ICD-10-CM

## 2024-04-30 PROCEDURE — 99207 PR NO CHARGE NURSE ONLY: CPT

## 2024-04-30 PROCEDURE — 96372 THER/PROPH/DIAG INJ SC/IM: CPT | Performed by: NURSE PRACTITIONER

## 2024-04-30 RX ADMIN — MEDROXYPROGESTERONE ACETATE 150 MG: 150 INJECTION, SUSPENSION INTRAMUSCULAR at 09:09

## 2024-05-15 ENCOUNTER — TELEPHONE (OUTPATIENT)
Dept: FAMILY MEDICINE | Facility: CLINIC | Age: 24
End: 2024-05-15
Payer: COMMERCIAL

## 2024-06-14 ENCOUNTER — TELEPHONE (OUTPATIENT)
Dept: PSYCHIATRY | Facility: CLINIC | Age: 24
End: 2024-06-14
Payer: COMMERCIAL

## 2024-06-14 NOTE — TELEPHONE ENCOUNTER
[facility]          Patient Name: Cassidy Fernandes  /Age:  2000 (23 year old)      Intervention: Left voicemail for patient to update registration prior to  appointment with Anna Marie Gonzalez.      Status of Referral: Active - pending return call from patient      Plan: Update registration and confirm insurance information with patient.    Yolis Marx, Complex     [facility]  [phone]

## 2024-06-19 NOTE — PROGRESS NOTES
Rafael Holguin is a 54 year old male patient of Dr. Alberto Ortiz who presents for transfer of care for gross hematuria. Patient states this was noted on 4/3/2018. Patient states a strong past medical history of multiple renal stones requiring intervention for pain.     PAIN/COLIC QUALITY: None, just gross intermittent hematuria    RISK FACTORS:  Tobacco abuse:                         No  Trauma:                                     No  Stone disease:                          Yes  Family History stone disease:   Yes  Sexually Transmitted Disease: No  Good Fluid by Mouth:                Yes    Prior work up includes repeated extracorporeal shockwave lithotripsy and 1 percutaneous lithotripsy (right side) while living in [place], [place]    Past prostate yes but results unknown  No results found for: PSA   Discussed the controversy regarding PSA screening.   - He is imaged in baseline PSA.   - Order placed.    CALCIUM (mg/dL)   Date Value   09/29/2017 9.2     Creatinine (mg/dL)   Date Value   09/29/2017 0.94     GENITOURINARY: Review is positive for urgency and weak stream (\"drinks a lot of fluid\"), on tamsulosin. (IPSS) of 10/35  (a moderate urinary symptom score is 8-19)  and quality of life of 3/6.    Current Outpatient Prescriptions   Medication Sig Dispense Refill   • tamsulosin (FLOMAX) 0.4 MG Cap Take 1 capsule by mouth daily after a meal. 30 capsule 0   • clotrimazole-betamethasone (LOTRISONE) 1-0.05 % cream Apply topically 2 times daily. 30 g 0   • vardenafil (LEVITRA) 10 MG tablet Take one tab every 36 hours as needed 6 tablet 3   • cholecalciferol (VITAMIN D3) 1000 UNITS tablet Take 4,000 Units by mouth daily.     • acetaminophen (TYLENOL) 500 MG tablet Take 1,000 mg by mouth every 6 hours as needed for Pain.       No current facility-administered medications for this visit.      ALLERGIES:   Allergen Reactions   • Levaquin HIVES     Patient was on Levaquin, tessalon, and zofran. Unsure which medication he  [facility]  Preliminary Diagnostic Assessment Note    Name: Cassidy Fernandes  YOB: 2000  Date of Service:  Jun 25, 2024  Time of Service: 4:00pm to 5:30pm (90 minutes)  Service Type(s): 00200 Diagnostic Assessment  23328 Psychological Test Evaluation Services (first hour)  60152 Psychological Test Evaluation Services (additional hours: 1)  76813 Psychological Test Administration & Scoring (first 30 min.)   Type of service: In-person ([facility])    Diagnoses:     Encounter Diagnoses   Name Primary?    Major depressive disorder, recurrent, moderate (H) Yes    Posttraumatic stress disorder      Other: interpersonal/relationship stressors, postpartum adjustment      Cassidy Fernandes was seen for a diagnostic assessment, including self-report norm-referenced rating scales to assess mood and functioning. Based on preliminary information collected from interview and testing, her previous diagnoses above are retained. Any additional diagnoses will be ruled in or out once all testing data is scored, interpreted, and written up in a final report. A full report detailing the interview/testing data, final diagnoses, and recommendations will follow as a separate abstract encounter.     Plan:  Cassidy Fernandes will return on 7/18/24 for therapy. Results of the present assessment and associated recommendations will be discussed during treatment planning discussions.       See below for time spend and codes for Psychological Testing.      Anna Marie Gonzalez, PhD, LP    Clinical Psychologist               Psych Testing Evaluation (54546 & [id number])  Psych testing was administered on 6/25/24, by Anna Marie Gonzalez, PhD, LP. Total time spent on evaluation was:   26868 Psychological testing evaluation services (first hour):   __1__   92922 Psychological testing evaluation services (additional hours): __1__      Professional Time (everything except test administration and scoring time)       Activity Date Minutes   Review of records   15   Case conceptualization/test selection       Supervision; consulting w/ supervisor       Integration, interpretation, treatment planning   15   Feedback       Report Writing   90   Total Hours 2            Test Administration and Scoring Performed by a psychologist ([id number] & [id number])  Psych testing was administered on 6/25/24, by Anna Marie Gonzalez, PhD, LP. Total time spent in test administration and scoring was __30 min.__.  25659 Testing & scoring under psychologist supervision (first 30 minute unit): _1____    is allergic to   • Sulfa Antibiotics RASH   • Tessalon [Benzonatate] HIVES     Patient was taking levaquin, zofran, and tessalon-unsure which medication caused reaction   • Zofran [Ondansetron] HIVES     Patient was taking levaquin, zofran, and tessalon-unsure which medication caused reaction     Past Medical History:   Diagnosis Date   • Glandular hypospadias    • Hx of basal cell carcinoma 7/28/2017   • Kidney stone 6/12/2015   • Kidney stone on right side 2013   • Kidney stones     x7-8   • Left-sided low back pain with left-sided sciatica 9/30/2016     Past Surgical History:   Procedure Laterality Date   • Fragmenting of kidney stone Right     2000, 2001-2008 x2   • Meatoplasty  1987    Hypospadius w/ meatal & rec-UTI   • Past surgical history      MOHS procedure for BCC - Occiput   • Percut remv kid stone,2+ cm Right 2006    Kidney Stone   • Varicocelectomy Left 1980's    Varicoelectomy     Social History     Social History   • Marital status: Single     Spouse name: N/A   • Number of children: 1   • Years of education: 20     Occupational History   • [employer]     Social History Main Topics   • Smoking status: Never Smoker   • Smokeless tobacco: Never Used   • Alcohol use 0.6 - 1.2 oz/week     1 - 2 Standard drinks or equivalent per week      Comment: 3 TIMES A WEEK ONE BEER   • Drug use: No   • Sexual activity: Yes     Partners: Female     Birth control/ protection: Condom     Other Topics Concern   • Not on file     Social History Narrative   • No narrative on file     Sexual history: Patient denies history of STD  normal sexual function with monogamous partner, condoms  Family History   Problem Relation Age of Onset   • Genitourinary Mother 58     Precancerous kidney: open partial nephrectomy   • Genitourinary Father      kidney stones   • Cancer Maternal Grandfather 67     prostate   • Genitourinary Brother      kidney stones     REVIEW OF SYSTEMS:  Reviewed and agree with nursing notes. Patient denies change in bowels, weight, sleep, appetite, activity.    PE: Blood pressure 122/68, resp. rate 16, weight 79 kg. Alert, well-developed, well-nourished  male. He is appears in no distress, alert, cooperative, well developed, well nourished appears their stated age  HEENT:  WNL, Normal atraumatic and normocephalic (unremarkable). Oral cavity clear with moist membranes & good dentition  NECK:  The trachea is mid-line. Neck is supple with full range of motion. No thyroid enlargement is noted. No evidence of cervical or supraclavicular adenopathy. The patient has a normal palpable carotid up stroke.  LUNG:  chest symmetric with normal A/P diameter, no chest deformities noted, normal respiratory rate and rhythm, no chest wall tenderness, diaphragmatic excursion normal  HEART:  normal with quiet precordium, PMI normal, regular rate & rhythm  ABDOMEN: soft, non-tender, bowel sounds normal, no masses, hepatomegaly or splenomegaly noted  LYMPH NODE:  no cervical, axillary, or femoral adenopathy noted  GENITAL:  He has normal circumcised external male genitalia with a open meatus (consistent with surgical history), normal glands penis, penile shaft, and pendulous urethra. The scrotum is well formed with normal bilateral descended testis of good size and consistency. The spermatic cords, epididymis, and vasa are normal to palpation. There is neither varicocele nor hernia(s) noted.  RECTAL:  Normal rectal opening without hemorrhoids, normal anal tone. The rectal vault is empty of stool. No blood nor rectal mass is noted. The prostate is minimally enlarged bilateral, with an approximal volume of <3 gms, scant bulge.   NEUROLOGIC:  The patient is oriented to person, place & time. Mood & affect is within normal limits. The patient is pleasant & cooperative.  EXTREMITIES:  supple & symmetrical with full ROM and no clubbing, cyanosis or edema  SKIN: normal color, normal  texture, normal turgor, no skin rashes, no atypical appearing skin lesions and no bruises  Urine dip: Clear yellow, Blood: +4, N/L: negative  KUB(12/26/2014 - 4/16/2018): (films reviewed with patient @ this time, MD2)   The intestinal gas pattern is unremarkable.  There is a 17 x 6 mm calcification in the mid to lower pole of the left kidney. This is larger than on the prior study. This is more cephalad and orientated more horizontally than on the prior study. This is probably in the left renal pelvis. There is a bilobed calcification in the upper pole of the left kidney measuring 6.8 mm not significantly changed. There is a 4 mm calcification lower pole of the left kidney, new.  Right renal stone noted 2014 passed in 6/2015, MURRAY    IMPRESSION:   His presenting symptom of intermittent gross hematuria is consistent both with stone disease and \"other\". In the past, active stone/passage of stone has been completed by renal colic which makes this presentation atypical. This really looks like he has another stone that has grown a left side and is now a new position so likely this is the cause of hematuria. But \"other\" urinary possibilities need to be addressed: Discussed preoperative CT.  Stone is certainly expected and implied by the findings on KUB. A large left renal stone is noted that requires intervention by virtue of obstruction, renal injury, size relative to the patients age & health, &/or infection. Advised of the options of watch and wait vs intervention via open surgical vs PNL vs ureteroscopic approach vs ESL. Strain all urine. Encourage fluids. Call for fevers, chill, severe nausea and vomiting, or severe pain.  - Left percutaneous nephrolithotomy: Discussed that this is a large stone but I do not think it crosses a threshold were PCNL if indicated procedure. The stone although long is fairly narrow and is positioned in the renal pelvis with reasonable likelihood of a fragment passage.  - ESWL vs URS: ESWL  success increases if stone is < 2 cm, in upper pole/renal pelvis/proximal ureter, & density is <1000 HU, distance to stone is <10 cm. Favoring URS is higher success rate to remove stone.   - Favoring ESWL is \"non-invasive\" (less chance for operative misadventure, patients like that), may take less operative time than complex URS, less likely to need stent post operatively,  - Favoring URS is higher success rate to remove stone (with ESWL stone fragments still need to pass the fragments & fragments maybe larger than anticipated) & if stone not visible on standard x-ray (HU density <500 suggest Uric acid)    RECOMMENDATIONS:   CT then left extracorporeal shockwave lithotripsy with possible stent if appearance of large fragments  Copy of this transfer of care is sent to Paige Jaquez PA-C & Dr. Alberto Ortiz       Paramedian Forehead Flap Division And Inset Text: Division and inset of the paramedian forehead flap was performed to achieve optimal aesthetic result, restore normal anatomic appearance and avoid distortion of normal anatomy, expedite and facilitate wound healing, achieve optimal functional result and because linear closure either not possible or would produce suboptimal result. The patient was prepped and draped in the usual manner. The pedicle was infiltrated with local anesthesia. The pedicle was sectioned with a #15 blade. The pedicle was de-bulked and trimmed to match the shape of the defect. Hemostasis was achieved. The flap donor site and free margin of the flap were secured with deep buried sutures and the wound edges were re-approximated.

## 2024-06-25 ENCOUNTER — OFFICE VISIT (OUTPATIENT)
Dept: PSYCHIATRY | Facility: CLINIC | Age: 24
End: 2024-06-25
Payer: COMMERCIAL

## 2024-06-25 DIAGNOSIS — F33.1 MAJOR DEPRESSIVE DISORDER, RECURRENT, MODERATE (H): Primary | ICD-10-CM

## 2024-06-25 DIAGNOSIS — F43.10 POSTTRAUMATIC STRESS DISORDER: ICD-10-CM

## 2024-06-25 PROCEDURE — 96131 PSYCL TST EVAL PHYS/QHP EA: CPT | Performed by: PSYCHOLOGIST

## 2024-06-25 PROCEDURE — 90791 PSYCH DIAGNOSTIC EVALUATION: CPT | Performed by: PSYCHOLOGIST

## 2024-06-25 PROCEDURE — 96136 PSYCL/NRPSYC TST PHY/QHP 1ST: CPT | Performed by: PSYCHOLOGIST

## 2024-06-25 PROCEDURE — 96130 PSYCL TST EVAL PHYS/QHP 1ST: CPT | Performed by: PSYCHOLOGIST

## 2024-07-18 ENCOUNTER — BEH TREATMENT PLAN (OUTPATIENT)
Dept: PSYCHIATRY | Facility: CLINIC | Age: 24
End: 2024-07-18
Payer: COMMERCIAL

## 2024-07-18 ENCOUNTER — OFFICE VISIT (OUTPATIENT)
Dept: PSYCHIATRY | Facility: CLINIC | Age: 24
End: 2024-07-18
Payer: COMMERCIAL

## 2024-07-18 DIAGNOSIS — F33.1 MAJOR DEPRESSIVE DISORDER, RECURRENT, MODERATE (H): Primary | ICD-10-CM

## 2024-07-18 DIAGNOSIS — F43.10 POSTTRAUMATIC STRESS DISORDER: ICD-10-CM

## 2024-07-18 PROCEDURE — 90837 PSYTX W PT 60 MINUTES: CPT | Performed by: PSYCHOLOGIST

## 2024-07-18 NOTE — PROGRESS NOTES
OUTPATIENT PSYCHOTHERAPY PROGRESS NOTE    Client Name: Cassidy Fernandes   YOB: 2000 (23 year old)   Date of Service:  Jul 18, 2024  Time of Service: 3:05pm to 4:03pm (58 minutes)  Service Type(s): 25118 psychotherapy (53-60 min. w/ patient and/or family)  Type of service: In-person at [facility]     Diagnoses:   F33.1 Major Depressive Disorder, recurrent episode, moderate severity  F43.10 Post-Traumatic Stress Disorder     Other: interpersonal/relationship stressors, postpartum adjustment     Individuals Present:   Cassidy    Treatment goal(s) being addressed:   Decrease symptoms depression/anger  Increase self-care and coping skills (michael, fulfillment, hobbies, social support)  Improve interpersonal effectiveness (boundaries, communication/conflict resolution, self-advocacy)    Subjective:  Cassidy provided updates from past few weeks. Baby has been sick and fussy and his sleeping schedule has been off. This has increase stress and exhaustion for Cassidy, but also tension/conflict within the home among caregivers (partner and mother-in-law). In addition to Cassidy struggling with regulating her own stress and emotions, there are stressful interpersonal dynamics among the adults in the home (poor communication, resentment, cultural differences and parenting styles).     Cassidy is feeling overwhelmed. She wishes she has someone to take care of her and step up to help more. She is always caring for others. Her birthday is coming up this weekend and she wishes someone would take the initiative to see her, care for her, support her, and celebrate her without her having to ask.    Cassidy is wondering about medication management of mood symptoms to supplement therapeutic intervention.    Treatment:   Treatment modality is cognitive behavioral therapy and supportive psychotherapy. Session focused on processing thoughts and feelings around current interpersonal stressors. Also reviewed testing results and  diagnoses and treatment goals and plan. Cassidy provided verbal consent.      Therapeutic strategies used include: reflective listening, validation, cognitive restructuring, and small-goal setting.     Assessment and Progress:   Behavioral Observations: Cassidy presenting with variable affect appropriate to context (euthymic and times, sad/tearful at times).  Cassidy was open and reflective throughout the session. She was receptive to and appreciative of therapeutic support provided today.    Assessment: Cassidy continues to struggle with depression and irritability associated with postpartum mood changes and interpersonal challenges. There are no safety concerns (Cassidy denies ideation, plan, or intent to harm self or others). Cassidy is early in the therapy process, but will likely make good progress towards treatment goals.     Plan:   Next therapy appointment has been scheduled for 7/25/24 to continue work on treatment goals. Will assist with PCP referrals so that Cassidy can pursue potential medication management of mood symptoms.      Anna Marie Gonzalez, PhD,   Clinical Psychologist      Treatment Plan review due: 10/18/24

## 2024-07-18 NOTE — PROGRESS NOTES
OUTPATIENT TREATMENT PLAN SUMMARY    Client Name: Cassidy Fernandes   YOB: 2000 (23 year old)   Date of Treatment Plan: 7/18/24 (90 day review date:10/18/24)   Date of initial service: 6/25/24                               1. DSM-V Diagnoses:   F33.1 Major Depressive Disorder, recurrent episode, moderate severity  F43.10 Post-Traumatic Stress Disorder     Other: interpersonal/relationship stressors, postpartum adjustment     2. Current symptoms and circumstances that substantiate the diagnosis:   Depressed mood and/or irritability most days, episodes of tearfulness, negative cognitions (hopelessness, low self-esteem, feelings of shame/guilt), anhedonia, changes in appetite (lower) and energy (lower).    Trauma symptoms: intrusive memories, avoidance, negative view of self/others/world, hyperarousal symptoms.    3. How symptoms and/or behaviors are affecting level of function:   Symptoms above have increased significantly since Cassidy experienced a challenging birth post-partum period. Several interpersonal stressors have exacerbated mood symptoms, including: high mobility, financial challenges, death of father, relationship conflict with partner. Symptoms interfere with Cassidy's daily functioning, impact her personal relationships, and ability to be as present and joyful in her role as a new mother as she would like.    4. Risk Assessment:  Suicide:  Assessed Level of Immediate Risk: None  Ideation: No  Plan:  No  Means: No  Intent: No    Homicide/Violence:  Assessed Level of Immediate Risk: None  Ideation: No  Plan: No  Means: No  Intent: No    If on a medication, please include name and dosage:  None      Symptom/Problem Measurable Goals Interventions Gains Made   1. Depression/anger 1.  Cassidy will score in average to mild range on the Cali Depression Inventory (BDI)    6/25/24 BDI Total = 35 severe range 1.CBT, supportive psychotherapy 1. New goal   2.Limited coping strategies, limited  pleasurable activities 2. Cassidy will increase use of health coping strategies by 80%.     Once a week she will connect with someone socially for pleasure, or will engage in a hobby/activity for michael/pleasure. 2. CBT, supportive psychotherapy 2. New goal   3. Interpersonal challenges 3.  Cassidy will increase interpersonal effectiveness (e.g., improved boundary setting, increased self-advocacy, improved conflict resolution/communication skills), per self-reported reduction in interpersonal conflicts by 50%  3. Communication and interpersonal skill building  3. New Goal     5. Frequency of Sessions: Therapy will initially occur on a weekly basis; frequency will be adjusted as needed pending response to treatment    6. Discharge and Aftercare Goals: To be determined.     7. Expected duration of treatment:  12-18 months    8. Participants in therapy plan (family, friends, support network): Dr. Carlos Benjamin      Treatment Plan and goals were reviewed on 7/18/24 and verbal consent was obtained. Please see encounter note for Acknowledgement of Current Treatment Plan          Regulatory Guidelines for Updating Treatment Plan  [place] Medical Assistance: Reviewed & signed at least every 90days  Medicare:  Update per policy    Anna Marie Gonzalez, PhD,       Licensed Psychologist
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
## 2024-07-29 NOTE — PROGRESS NOTES
Pregnancy Confirmation Visit:     Assessment:   Pregnancy confirmation,    UPT in clinic negative         Plan:   -Discussed occurrence of false positive home tests   -Discussed option for beta hcg for reassurance, patient in agreement  -Plan to return to clinic tomorrow for Depo Provera injection if negative hcg  -Discussed no intercourse before Depo Provera injection  -Return to Clinic tomorrow  -Call with questions/concerns     Subjective:   Cassidy Fernandes is a 24 year old, here for pregnancy confirmation visit.   This is an unplanned pregnancy. States she had 3 positive tests at home 3 weeks ago, all were a faint positive test. Denies any currrent symptoms of pregnancy but states she didn't have early pregnancy symptoms with her first pregnancy. Patient curious why she had 3 positive tests at home and negative test in clinic today.   Patient currently on Depo Provera for contraception and last given on 4-30-24, window for next dose 7-16-24 to 7-30-24.          Objective:     Physical exam deferred until IOB visit

## 2024-07-30 ENCOUNTER — OFFICE VISIT (OUTPATIENT)
Dept: MIDWIFE SERVICES | Facility: CLINIC | Age: 24
End: 2024-07-30
Payer: COMMERCIAL

## 2024-07-30 VITALS
BODY MASS INDEX: 33.61 KG/M2 | WEIGHT: 202 LBS | DIASTOLIC BLOOD PRESSURE: 76 MMHG | HEART RATE: 95 BPM | OXYGEN SATURATION: 98 % | SYSTOLIC BLOOD PRESSURE: 120 MMHG

## 2024-07-30 DIAGNOSIS — Z32.00 POSSIBLE PREGNANCY: Primary | ICD-10-CM

## 2024-07-30 LAB
HCG INTACT+B SERPL-ACNC: <1 MIU/ML
HCG UR QL: NEGATIVE

## 2024-07-30 PROCEDURE — 36415 COLL VENOUS BLD VENIPUNCTURE: CPT | Performed by: ADVANCED PRACTICE MIDWIFE

## 2024-07-30 PROCEDURE — 84702 CHORIONIC GONADOTROPIN TEST: CPT | Performed by: ADVANCED PRACTICE MIDWIFE

## 2024-07-30 PROCEDURE — 99213 OFFICE O/P EST LOW 20 MIN: CPT | Performed by: ADVANCED PRACTICE MIDWIFE

## 2024-07-30 PROCEDURE — 81025 URINE PREGNANCY TEST: CPT | Performed by: ADVANCED PRACTICE MIDWIFE

## 2024-07-31 ASSESSMENT — PATIENT HEALTH QUESTIONNAIRE - PHQ9: SUM OF ALL RESPONSES TO PHQ QUESTIONS 1-9: 16

## 2024-08-01 ENCOUNTER — OFFICE VISIT (OUTPATIENT)
Dept: PSYCHIATRY | Facility: CLINIC | Age: 24
End: 2024-08-01
Payer: COMMERCIAL

## 2024-08-01 DIAGNOSIS — F33.1 MAJOR DEPRESSIVE DISORDER, RECURRENT, MODERATE (H): Primary | ICD-10-CM

## 2024-08-01 DIAGNOSIS — F43.10 POSTTRAUMATIC STRESS DISORDER: ICD-10-CM

## 2024-08-01 PROCEDURE — 90837 PSYTX W PT 60 MINUTES: CPT | Performed by: PSYCHOLOGIST

## 2024-08-01 NOTE — PROGRESS NOTES
OUTPATIENT PSYCHOTHERAPY PROGRESS NOTE    Client Name: Cassidy Fernandes   YOB: 2000 (23 year old)   Date of Service:  8/01/24  Time of Service: 3:05pm to 4:00pm (55 minutes)  Service Type(s): 44910 psychotherapy (53-60 min. w/ patient and/or family)  Type of service: In-person at [facility] clinic     Diagnoses:   F33.1 Major Depressive Disorder, recurrent episode, moderate severity  F43.10 Post-Traumatic Stress Disorder     Other: interpersonal/relationship stressors, postpartum adjustment     Individuals Present:   Cassidy    Treatment goal(s) being addressed:   Decrease symptoms depression/anger  Increase self-care and coping skills (michael, fulfillment, hobbies, social support)  Improve interpersonal effectiveness (boundaries, communication/conflict resolution, self-advocacy)    Subjective:  Cassidy provided updates of the past two weeks. Her birthday was disappointing. Most of her family and partner's family did not reach out to wish her a happy birthday. She was grateful that her partner and brother did celebrate her. She feels like an outsider in the home.  She does not feel heard , seen, or respected. Cultural and language barriers exacerbate the tension and conflict. She and partner hope to move out in September. 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 She is hoping the financial income will help with their timeline to move and will help reduce stress once they are able to settle into their own place.    Treatment:   Treatment modality is cognitive behavioral therapy and supportive psychotherapy. Session focused on processing thoughts and feelings around current interpersonal stressors. Reviewed communication and conflict resolution strategies. Therapeutic strategies used include: reflective listening, validation, cognitive restructuring, and small-goal setting.     Assessment and Progress:   Behavioral Observations: Cassidy presenting with variable affect  appropriate to context (mostly neutral, sad/tearful at times).  Cassidy was open and reflective throughout the session. She was receptive to and appreciative of therapeutic support provided today.    Assessment: Cassidy continues to struggle with depression and irritability associated with postpartum mood changes and interpersonal challenges. There are no safety concerns (Cassidy denies ideation, plan, or intent to harm self or others). Cassidy is early in the therapy process, but will likely make good progress towards treatment goals.     Plan:   Next therapy appointment has been scheduled for 8/8/24 to continue work on treatment goals. Cassidy will look into referrals for PCP care that this provider shared.      Anna Marie Gonzalez, PhD,   Clinical Psychologist      Treatment Plan review due: 10/18/24

## 2024-08-12 ENCOUNTER — VIRTUAL VISIT (OUTPATIENT)
Dept: PSYCHIATRY | Facility: CLINIC | Age: 24
End: 2024-08-12
Payer: COMMERCIAL

## 2024-08-12 DIAGNOSIS — F33.1 MAJOR DEPRESSIVE DISORDER, RECURRENT, MODERATE (H): Primary | ICD-10-CM

## 2024-08-12 DIAGNOSIS — F43.10 POSTTRAUMATIC STRESS DISORDER: ICD-10-CM

## 2024-08-12 PROCEDURE — 90832 PSYTX W PT 30 MINUTES: CPT | Mod: 95 | Performed by: PSYCHOLOGIST

## 2024-08-12 NOTE — PROGRESS NOTES
"OUTPATIENT PSYCHOTHERAPY PROGRESS NOTE    Client Name: Cassidy Fernandes   YOB: 2000 (23 year old)   Date of Service:  8/12/24  Time of Service: 12:02pm to 12:26pm (24 minutes)  Service Type(s): 88408 psychotherapy (16-37 min. w/ patient and/or family)  Type of service: Telemedicine Psychotherapy  Reason for Telemedicine Visit: patient request, childcare barriers  Mode of transmission: Secure real time interactive audio and visual telecommunication system (videoconference via [employer])  Location of originating and distant sites:  Originating site (patient location): patient home  Distant site (provider site): Off-site: provider home office  Telemedicine Visit: The patient's condition can be safely assessed and treated via synchronous audio and visual telemedicine encounter.  Patient has agreed to receiving services via telemedicine technology.    Diagnoses:   F33.1 Major Depressive Disorder, recurrent episode, moderate severity  F43.10 Post-Traumatic Stress Disorder     Other: interpersonal/relationship stressors, postpartum adjustment     Individuals Present:   Cassidy    Treatment goal(s) being addressed:   Decrease symptoms depression/anger  Increase self-care and coping skills (michael, fulfillment, hobbies, social support)  Improve interpersonal effectiveness (boundaries, communication/conflict resolution, self-advocacy)    Subjective:  Cassidy provided updates from the past two weeks. She reported that \"things have been better.\" She and her mother-in-law had a talk about their recent tension/conflict. They each acknowledged misunderstanding and miscommunication. Since then their relationships feels better. Cassidy and her partner continue to want to move into their own place soon once their finances are in better order. Cassidy and her partner have been trying to spend small amounts of time to connect and talk by going for a walk together with the baby.     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 " She is nervous about the baby's schedule/routine getting off track, but trusts that he will adjust.    Cassidy's mood has been more stable lately. She has been slightly less irritable and tearful. She attributes this to being able to get more rest and also going off of her birth control (Depo Provera injection). She is wanting to get her hormones more naturally regulated, so she and partner are tryign other methods of contraceptives. Cassidy's last injection was two weeks ago.     Treatment:   Treatment modality is cognitive behavioral therapy and supportive psychotherapy. Session focused on obtaining updates and processing thoughts and feelings around current stressors and successes. Therapeutic strategies used include: reflective listening, validation, cognitive restructuring, and small-goal setting.     Assessment and Progress:   Behavioral Observations: Cassidy presenting with neutral to euthymic affect.  Attention was divided due to her baby's presence and a young nephew; session was therefore truncated.  Cassidy was open, engaged, and reflective when able to focus during the call.    Assessment: Cassidy's mood has been stable and slightly improved the past two weeks, likely in part due to getting more rest, resolving interpersonal ruptures, and perhaps affected by hormonal/medication changes. There are no safety concerns (Cassidy denies ideation, plan, or intent to harm self or others). Cassidy is making steady progress towards treatment goals.     Plan:   Next therapy appointment has been scheduled for 8/28/24 to continue work on treatment goals. Will switch to biweekly appointment due to her new work schedule. Cassidy will look into referrals for PCP care that this provider shared.      Anna Marie Gonzalez, PhD,   Clinical Psychologist      Treatment Plan review due: 10/18/24

## 2024-08-12 NOTE — NURSING NOTE
Current patient location: [address] [place] [id number]    Is the patient currently in the state of [place]? YES    Visit mode:VIDEO    If the visit is dropped, the patient can be reconnected by: VIDEO VISIT: Text to cell phone:   Telephone Information:   Mobile [phone]       Will anyone else be joining the visit? NO  (If patient encounters technical issues they should call [phone]    How would you like to obtain your AVS? MyChart    Are changes needed to the allergy or medication list? No    Are refills needed on medications prescribed by this physician? NO    Rooming Documentation:  Assigned questionnaire(s) completed      Reason for visit: No chief complaint on file.    Elma VEGAF

## 2024-08-17 ENCOUNTER — HOSPITAL ENCOUNTER (OUTPATIENT)
Dept: MRI IMAGING | Facility: CLINIC | Age: 24
Discharge: HOME | End: 2024-08-17
Attending: PEDIATRICS | Admitting: PEDIATRICS
Payer: COMMERCIAL

## 2024-08-17 DIAGNOSIS — M25.532 CHRONIC PAIN OF LEFT WRIST: ICD-10-CM

## 2024-08-17 DIAGNOSIS — G89.29 CHRONIC PAIN OF LEFT WRIST: ICD-10-CM

## 2024-08-17 PROCEDURE — 73221 MRI JOINT UPR EXTREM W/O DYE: CPT | Mod: 26 | Performed by: RADIOLOGY

## 2024-08-17 PROCEDURE — 73221 MRI JOINT UPR EXTREM W/O DYE: CPT | Mod: LT

## 2024-08-19 ENCOUNTER — OFFICE VISIT (OUTPATIENT)
Dept: ORTHOPEDICS | Facility: CLINIC | Age: 24
End: 2024-08-19
Payer: COMMERCIAL

## 2024-08-19 DIAGNOSIS — M67.432 GANGLION CYST OF WRIST, LEFT: Primary | ICD-10-CM

## 2024-08-19 PROCEDURE — 99213 OFFICE O/P EST LOW 20 MIN: CPT | Performed by: PEDIATRICS

## 2024-08-19 NOTE — PATIENT INSTRUCTIONS
Reviewed the MRI, confirming dorsal wrist ganglion.  We discussed living with it (bracing, symptom management, activity modification when symptomatic); trial of aspiration with or without steroid injection (with use of imaging guidance for procedure); referral on for further discussion with orthopedic surgery.  Following discussion, placed referral for further discussion with orthopedic surgery.  Will leave follow-up here open-ended.  Contact clinic for other questions or concerns.    If you have any further questions for your physician or physician s care team you can contact them thru [employer] or by calling [phone].

## 2024-08-19 NOTE — LETTER
8/19/2024      Cassidy Fernandes  [address] [facility] [place] [address]      Dear Colleague,    Thank you for referring your patient, Cassidy Fernandes, to the [facility]. Please see a copy of my visit note below.    ASSESSMENT & PLAN    Cassidy was seen today for follow up.    Diagnoses and all orders for this visit:    Ganglion cyst of wrist, left  -     Orthopedic  Referral; Future    Desires to discuss possible excision. Referral placed.  See below.  Questions answered. Discussed signs and symptoms that may indicate more serious issues; the patient was instructed to seek appropriate care if noted. Cassidy indicates understanding of these issues and agrees with the plan.      See Patient Instructions  Patient Instructions   Reviewed the MRI, confirming dorsal wrist ganglion.  We discussed living with it (bracing, symptom management, activity modification when symptomatic); trial of aspiration with or without steroid injection (with use of imaging guidance for procedure); referral on for further discussion with orthopedic surgery.  Following discussion, placed referral for further discussion with orthopedic surgery.  Will leave follow-up here open-ended.  Contact clinic for other questions or concerns.    If you have any further questions for your physician or physician s care team you can contact them thru MyChart or by calling [phone].      Leodan Diamond DO  [facility]    SUBJECTIVE- Interim History August 19, 2024    No chief complaint on file.      Cassidy Fernandes is a 24 year old female who is seen in f/u up for Data Unavailable. 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 Reports that the cyst like structure has grown recently, noting pain has increased slightly with all the time.    The patient is seen by  themselves.    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 Does note that she holds her child in the left arm when she does house things.      REVIEW OF SYSTEMS:  Review of Systems    OBJECTIVE:  There were no vitals taken for this visit.     Left wrist:  Dorsal wrist prominence consistent with underlying ganglion      RADIOLOGY:  Final results and radiologist's interpretation, available in the [facility] health record.  Images were reviewed with the patient in the office today.  My personal interpretation of the performed imaging: dorsal wrist ganglion, multiloculated.      Exam: MRI of the left wrist dated 8/17/2024.     COMPARISON: 2/9/2024.     CLINICAL HISTORY: Dorsal wrist pain.     TECHNIQUE: Multiplanar, multisequence imaging of the left wrist was  obtained using standard sequences in 3 orthogonal planes without the  use of intravenous or intra-articular gadolinium contrast.     FINDINGS:     A marker was placed along the dorsal aspect of the left wrist. Deep to  the marker and deep to the extensor tendons, there is a multiloculated  cystic structure, for example axial series 8 images 13-17; sagittal  series 6 images 8-13; and coronal series 4 images 12-13. Question if  this arises from the dorsal scapholunate ligament,, intimate with the  dorsal capsular tissues.     No significant fluid in the radiocarpal joint, with trace fluid in the  radioulnar joint.     No marrow signal abnormalities to suggest fracture or marrow  infiltration.      Scapholunate and lunotriquetral ligaments are grossly intact. The  triangular fibrocartilage complex is grossly intact.     The flexor tendons are intact. The median and ulnar nerves are  unremarkable. The extensor tendons are intact.                                                                      IMPRESSION:  1. Marker placed along the dorsal aspect of the left wrist.  Deep to  the marker, deep to the extensor tendons and intimate with the dorsal  capsule, there is a multiloculated cystic structure, as described  above, presumed ganglion cyst. This is adjacent to the dorsal  scapholunate ligament and intimate with the dorsal capsule.     2. No marrow signal changes in the left wrist to suggest fracture or  stress changes.     3. The tendinous and ligamentous structures about the left wrist are  intact.     NADYA RUBIN MD            Again, thank you for allowing me to participate in the care of your patient.        Sincerely,        Leodan Diamond,

## 2024-08-19 NOTE — PROGRESS NOTES
ASSESSMENT & PLAN    Cassidy was seen today for follow up.    Diagnoses and all orders for this visit:    Ganglion cyst of wrist, left  -     Orthopedic  Referral; Future    Desires to discuss possible excision. Referral placed.  See below.  Questions answered. Discussed signs and symptoms that may indicate more serious issues; the patient was instructed to seek appropriate care if noted. Cassidy indicates understanding of these issues and agrees with the plan.      See Patient Instructions  Patient Instructions   Reviewed the MRI, confirming dorsal wrist ganglion.  We discussed living with it (bracing, symptom management, activity modification when symptomatic); trial of aspiration with or without steroid injection (with use of imaging guidance for procedure); referral on for further discussion with orthopedic surgery.  Following discussion, placed referral for further discussion with orthopedic surgery.  Will leave follow-up here open-ended.  Contact clinic for other questions or concerns.    If you have any further questions for your physician or physician s care team you can contact them thru MyChart or by calling [phone].      [facility]    SUBJECTIVE- Interim History August 19, 2024    No chief complaint on file.      Cassidy Fernandes is a 24 year old female who is seen in f/u up for Data Unavailable. 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 Reports that the cyst like structure has grown recently, noting pain has increased slightly with all the time.    The patient is seen by themselves.    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 Does note that she holds her child in the left arm when she does house things.      REVIEW OF  SYSTEMS:  Review of Systems    OBJECTIVE:  There were no vitals taken for this visit.     Left wrist:  Dorsal wrist prominence consistent with underlying ganglion      RADIOLOGY:  Final results and radiologist's interpretation, available in the [facility] health record.  Images were reviewed with the patient in the office today.  My personal interpretation of the performed imaging: dorsal wrist ganglion, multiloculated.      Exam: MRI of the left wrist dated 8/17/2024.     COMPARISON: 2/9/2024.     CLINICAL HISTORY: Dorsal wrist pain.     TECHNIQUE: Multiplanar, multisequence imaging of the left wrist was  obtained using standard sequences in 3 orthogonal planes without the  use of intravenous or intra-articular gadolinium contrast.     FINDINGS:     A marker was placed along the dorsal aspect of the left wrist. Deep to  the marker and deep to the extensor tendons, there is a multiloculated  cystic structure, for example axial series 8 images 13-17; sagittal  series 6 images 8-13; and coronal series 4 images 12-13. Question if  this arises from the dorsal scapholunate ligament,, intimate with the  dorsal capsular tissues.     No significant fluid in the radiocarpal joint, with trace fluid in the  radioulnar joint.     No marrow signal abnormalities to suggest fracture or marrow  infiltration.      Scapholunate and lunotriquetral ligaments are grossly intact. The  triangular fibrocartilage complex is grossly intact.     The flexor tendons are intact. The median and ulnar nerves are  unremarkable. The extensor tendons are intact.                                                                      IMPRESSION:  1. Marker placed along the dorsal aspect of the left wrist. Deep to  the marker, deep to the extensor tendons and intimate with the dorsal  capsule, there is a multiloculated cystic structure, as described  above, presumed ganglion cyst. This is adjacent to the dorsal  scapholunate ligament and intimate with the  dorsal capsule.     2. No marrow signal changes in the left wrist to suggest fracture or  stress changes.     3. The tendinous and ligamentous structures about the left wrist are  intact.     NADYA RUBIN MD

## 2024-08-25 ENCOUNTER — HEALTH MAINTENANCE LETTER (OUTPATIENT)
Age: 24
End: 2024-08-25

## 2024-08-28 ENCOUNTER — OFFICE VISIT (OUTPATIENT)
Dept: PSYCHIATRY | Facility: CLINIC | Age: 24
End: 2024-08-28
Payer: COMMERCIAL

## 2024-08-28 DIAGNOSIS — F43.10 POSTTRAUMATIC STRESS DISORDER: ICD-10-CM

## 2024-08-28 DIAGNOSIS — F33.1 MAJOR DEPRESSIVE DISORDER, RECURRENT, MODERATE (H): Primary | ICD-10-CM

## 2024-08-28 PROCEDURE — 90837 PSYTX W PT 60 MINUTES: CPT | Performed by: PSYCHOLOGIST

## 2024-08-28 ASSESSMENT — ANXIETY QUESTIONNAIRES
4. TROUBLE RELAXING: NEARLY EVERY DAY
IF YOU CHECKED OFF ANY PROBLEMS ON THIS QUESTIONNAIRE, HOW DIFFICULT HAVE THESE PROBLEMS MADE IT FOR YOU TO DO YOUR WORK, TAKE CARE OF THINGS AT HOME, OR GET ALONG WITH OTHER PEOPLE: VERY DIFFICULT
5. BEING SO RESTLESS THAT IT IS HARD TO SIT STILL: NOT AT ALL
GAD7 TOTAL SCORE: 16
1. FEELING NERVOUS, ANXIOUS, OR ON EDGE: MORE THAN HALF THE DAYS
3. WORRYING TOO MUCH ABOUT DIFFERENT THINGS: NEARLY EVERY DAY
2. NOT BEING ABLE TO STOP OR CONTROL WORRYING: MORE THAN HALF THE DAYS
7. FEELING AFRAID AS IF SOMETHING AWFUL MIGHT HAPPEN: NEARLY EVERY DAY
6. BECOMING EASILY ANNOYED OR IRRITABLE: NEARLY EVERY DAY

## 2024-08-28 ASSESSMENT — PATIENT HEALTH QUESTIONNAIRE - PHQ9: SUM OF ALL RESPONSES TO PHQ QUESTIONS 1-9: 16

## 2024-08-28 NOTE — PROGRESS NOTES
"OUTPATIENT PSYCHOTHERAPY PROGRESS NOTE    Client Name: Cassidy Fernandes   YOB: 2000 (23 year old)   Date of Service:  8/28/24  Time of Service: 5:05pm to 6:25pm (80 minutes)  Service Type(s): 28722 psychotherapy (53-60 min. w/ patient and/or family)  Type of service: In-person at [facility]     Diagnoses:   F33.1 Major Depressive Disorder, recurrent episode, moderate severity  F43.10 Post-Traumatic Stress Disorder     Other: interpersonal/relationship stressors, postpartum adjustment     Individuals Present:   Cassidy    Treatment goal(s) being addressed:   Decrease symptoms depression/anger  Increase self-care and coping skills (michael, fulfillment, hobbies, social support)  Improve interpersonal effectiveness (boundaries, communication/conflict resolution, self-advocacy)    Subjective:  Cassidy provided updates from the past few weeks. 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    Mood continues to be irritable and sad. Cassidy feels guilty for feeling irritable and frustrated. She feels she has \"lost\" pieces of herself since the baby was born. She does not have friends or hobbies and devotes the entirety of her day to taking care of others and managing hour to hour. She feels resentful when her partner is able to have his downtime (fishing), but is also not sure what she would even do with downtime.     Overall, she feels she is \"hurling down the mountain\" (I.e., mental health challenges increasing), but recognizes that the \"slope\" of the mountain has recently become less steep (I.e., things are still hard, but slightly less challenging than months prior).     Treatment:   Treatment modality is cognitive behavioral therapy and supportive psychotherapy. Session focused on processing thoughts and feelings around depressions symptoms, and current interpersonal " stressors. Discussed strategies for communication, coping, and cultivating purpose and michael. Therapeutic strategies used include: reflective listening, validation, cognitive restructuring, and small-goal setting.     Assessment and Progress:   Behavioral Observations: Cassidy presenting with variable affect appropriate to context (mostly neutral, sad/tearful at times).  Cassidy was open and reflective throughout the session. She was receptive to and appreciative of therapeutic support provided today.    Assessment: Cassidy continues to struggle with depression and irritability associated with postpartum mood changes and interpersonal challenges. There are no safety concerns (Cassidy denies ideation, plan, or intent to harm self or others). Cassidy is showing gradual progress towards treatment goals.     Plan:   Next therapy appointment has been scheduled for 9/11/24 to continue work on treatment goals. Cassidy will look into referrals for PCP care that this provider shared.      Anna Marie Gonzalez, PhD,   Clinical Psychologist      Treatment Plan review due: 10/18/24

## 2024-08-29 ASSESSMENT — ANXIETY QUESTIONNAIRES: GAD7 TOTAL SCORE: 16

## 2024-09-05 PROBLEM — Z00.00 PREVENTATIVE HEALTH CARE: Status: ACTIVE | Noted: 2024-09-05

## 2024-09-09 ENCOUNTER — TELEPHONE (OUTPATIENT)
Dept: FAMILY MEDICINE | Facility: CLINIC | Age: 24
End: 2024-09-09
Payer: COMMERCIAL

## 2024-09-11 ENCOUNTER — OFFICE VISIT (OUTPATIENT)
Dept: PSYCHIATRY | Facility: CLINIC | Age: 24
End: 2024-09-11
Payer: COMMERCIAL

## 2024-09-11 DIAGNOSIS — F33.1 MAJOR DEPRESSIVE DISORDER, RECURRENT, MODERATE (H): Primary | ICD-10-CM

## 2024-09-11 DIAGNOSIS — F43.10 POSTTRAUMATIC STRESS DISORDER: ICD-10-CM

## 2024-09-11 PROCEDURE — 90837 PSYTX W PT 60 MINUTES: CPT | Performed by: PSYCHOLOGIST

## 2024-09-11 ASSESSMENT — ANXIETY QUESTIONNAIRES
IF YOU CHECKED OFF ANY PROBLEMS ON THIS QUESTIONNAIRE, HOW DIFFICULT HAVE THESE PROBLEMS MADE IT FOR YOU TO DO YOUR WORK, TAKE CARE OF THINGS AT HOME, OR GET ALONG WITH OTHER PEOPLE: SOMEWHAT DIFFICULT
2. NOT BEING ABLE TO STOP OR CONTROL WORRYING: SEVERAL DAYS
3. WORRYING TOO MUCH ABOUT DIFFERENT THINGS: SEVERAL DAYS
6. BECOMING EASILY ANNOYED OR IRRITABLE: MORE THAN HALF THE DAYS
1. FEELING NERVOUS, ANXIOUS, OR ON EDGE: MORE THAN HALF THE DAYS
GAD7 TOTAL SCORE: 10
7. FEELING AFRAID AS IF SOMETHING AWFUL MIGHT HAPPEN: SEVERAL DAYS
5. BEING SO RESTLESS THAT IT IS HARD TO SIT STILL: SEVERAL DAYS
4. TROUBLE RELAXING: MORE THAN HALF THE DAYS
GAD7 TOTAL SCORE: 10

## 2024-09-11 ASSESSMENT — PATIENT HEALTH QUESTIONNAIRE - PHQ9: SUM OF ALL RESPONSES TO PHQ QUESTIONS 1-9: 11

## 2024-09-11 NOTE — PROGRESS NOTES
OUTPATIENT PSYCHOTHERAPY PROGRESS NOTE    Client Name: Cassidy Fernandes   YOB: 2000 (23 year old)   Date of Service:  9/11/24  Time of Service: 5:05pm to 6:05pm (60 minutes)  Service Type(s): 57619 psychotherapy (53-60 min. w/ patient and/or family)  Type of service: In-person at [facility]     Diagnoses:   F33.1 Major Depressive Disorder, recurrent episode, moderate severity  F43.10 Post-Traumatic Stress Disorder     Other: interpersonal/relationship stressors, postpartum adjustment     Individuals Present:   Cassidy    Treatment goal(s) being addressed:   Decrease symptoms depression/anger  Increase self-care and coping skills (michael, fulfillment, hobbies, social support)  Improve interpersonal effectiveness (boundaries, communication/conflict resolution, self-advocacy)    Subjective:  Cassidy provided updates from the past two weeks. 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 Conflicting caregiver styles an cultural differences among the many adults in the home continues to be a significant source of stress. Cassidy is nervous to discontinue her toddlers use of his pacifier this weekend, but is motivated to do so given his ongoing/frequent ear infections. She anticipates that he will be fussy and that his sleep will be disrupted as he acclimates to the change. She also anticipates that her mother in law and other adults will have strong, possibly conflicting options about how Cassidy should manage the switch and his impacted sleep.    Cassidy continues to look forward to moving out with her partner and son into their own apartment hopefully later this month or next. Overall, Cassidy feels that she mood is improving gradually and slightly. She is less tearful and her mood is stabilizing.    Treatment:   Treatment modality is cognitive behavioral therapy and supportive psychotherapy. Session focused on processing thoughts and feelings around conflicting  parenting strategies and cultural differences that exacerbate these caregiving practice differences among the adults in the home. Therapeutic strategies used include: reflective listening, validation, psychoeducation around parenting styles and developmentally appropriate caregiving behaviors (e.g., coregulation).     Assessment and Progress:   Behavioral Observations: Cassidy presenting with euthymic affect that remained regulate throughout.  Cassidy was open and reflective throughout the session. She was receptive to and appreciative of therapeutic support provided today.    Assessment: Cassidy's mood is gradually improving by self-report and consistent with self-rating data (PHQ-9) though ongoing interpersonal stressors continue in the home. There are no safety concerns (Cassidy denies ideation, plan, or intent to harm self or others). Cassidy is showing gradual progress towards treatment goals.         8/28/2024     6:00 PM 9/11/2024     5:00 PM 9/16/2024    10:52 AM   PHQ   PHQ-9 Total Score 16 11 12   Q9: Thoughts of better off dead/self-harm past 2 weeks Not at all Not at all Not at all        Plan:   Next therapy appointment has been scheduled for 9/25/24 to continue work on treatment goals.      Anna Marie Gonzalez, PhD,   Clinical Psychologist      Treatment Plan review due: 10/18/24

## 2024-09-16 ENCOUNTER — PATIENT OUTREACH (OUTPATIENT)
Dept: ONCOLOGY | Facility: CLINIC | Age: 24
End: 2024-09-16

## 2024-09-16 ENCOUNTER — OFFICE VISIT (OUTPATIENT)
Dept: FAMILY MEDICINE | Facility: CLINIC | Age: 24
End: 2024-09-16
Payer: COMMERCIAL

## 2024-09-16 VITALS
TEMPERATURE: 98.1 F | HEIGHT: 66 IN | HEART RATE: 82 BPM | DIASTOLIC BLOOD PRESSURE: 56 MMHG | WEIGHT: 201.4 LBS | OXYGEN SATURATION: 99 % | BODY MASS INDEX: 32.37 KG/M2 | SYSTOLIC BLOOD PRESSURE: 108 MMHG | RESPIRATION RATE: 20 BRPM

## 2024-09-16 DIAGNOSIS — Z80.9 FAMILY HISTORY OF CANCER: ICD-10-CM

## 2024-09-16 DIAGNOSIS — E66.811 CLASS 1 OBESITY DUE TO EXCESS CALORIES WITHOUT SERIOUS COMORBIDITY WITH BODY MASS INDEX (BMI) OF 32.0 TO 32.9 IN ADULT: ICD-10-CM

## 2024-09-16 DIAGNOSIS — E66.09 CLASS 1 OBESITY DUE TO EXCESS CALORIES WITHOUT SERIOUS COMORBIDITY WITH BODY MASS INDEX (BMI) OF 32.0 TO 32.9 IN ADULT: ICD-10-CM

## 2024-09-16 DIAGNOSIS — F33.1 MODERATE EPISODE OF RECURRENT MAJOR DEPRESSIVE DISORDER (H): Primary | ICD-10-CM

## 2024-09-16 DIAGNOSIS — F41.9 ANXIETY: ICD-10-CM

## 2024-09-16 PROCEDURE — 90471 IMMUNIZATION ADMIN: CPT | Performed by: FAMILY MEDICINE

## 2024-09-16 PROCEDURE — 90656 IIV3 VACC NO PRSV 0.5 ML IM: CPT | Performed by: FAMILY MEDICINE

## 2024-09-16 PROCEDURE — 96127 BRIEF EMOTIONAL/BEHAV ASSMT: CPT | Mod: 59 | Performed by: FAMILY MEDICINE

## 2024-09-16 PROCEDURE — 99214 OFFICE O/P EST MOD 30 MIN: CPT | Mod: 25 | Performed by: FAMILY MEDICINE

## 2024-09-16 RX ORDER — SERTRALINE HYDROCHLORIDE 25 MG/1
25 TABLET, FILM COATED ORAL DAILY
Qty: 30 TABLET | Refills: 1 | Status: SHIPPED | OUTPATIENT
Start: 2024-09-16

## 2024-09-16 ASSESSMENT — ASTHMA QUESTIONNAIRES
QUESTION_5 LAST FOUR WEEKS HOW WOULD YOU RATE YOUR ASTHMA CONTROL: COMPLETELY CONTROLLED
ACT_TOTALSCORE: 25
QUESTION_3 LAST FOUR WEEKS HOW OFTEN DID YOUR ASTHMA SYMPTOMS (WHEEZING, COUGHING, SHORTNESS OF BREATH, CHEST TIGHTNESS OR PAIN) WAKE YOU UP AT NIGHT OR EARLIER THAN USUAL IN THE MORNING: NOT AT ALL
QUESTION_2 LAST FOUR WEEKS HOW OFTEN HAVE YOU HAD SHORTNESS OF BREATH: NOT AT ALL
QUESTION_1 LAST FOUR WEEKS HOW MUCH OF THE TIME DID YOUR ASTHMA KEEP YOU FROM GETTING AS MUCH DONE AT WORK, SCHOOL OR AT HOME: NONE OF THE TIME
QUESTION_4 LAST FOUR WEEKS HOW OFTEN HAVE YOU USED YOUR RESCUE INHALER OR NEBULIZER MEDICATION (SUCH AS ALBUTEROL): NOT AT ALL
ACT_TOTALSCORE: 25

## 2024-09-16 ASSESSMENT — PATIENT HEALTH QUESTIONNAIRE - PHQ9
10. IF YOU CHECKED OFF ANY PROBLEMS, HOW DIFFICULT HAVE THESE PROBLEMS MADE IT FOR YOU TO DO YOUR WORK, TAKE CARE OF THINGS AT HOME, OR GET ALONG WITH OTHER PEOPLE: VERY DIFFICULT
SUM OF ALL RESPONSES TO PHQ QUESTIONS 1-9: 12
SUM OF ALL RESPONSES TO PHQ QUESTIONS 1-9: 12

## 2024-09-16 ASSESSMENT — ANXIETY QUESTIONNAIRES
7. FEELING AFRAID AS IF SOMETHING AWFUL MIGHT HAPPEN: SEVERAL DAYS
8. IF YOU CHECKED OFF ANY PROBLEMS, HOW DIFFICULT HAVE THESE MADE IT FOR YOU TO DO YOUR WORK, TAKE CARE OF THINGS AT HOME, OR GET ALONG WITH OTHER PEOPLE?: VERY DIFFICULT
GAD7 TOTAL SCORE: 12

## 2024-09-16 NOTE — PROGRESS NOTES
"  Assessment & Plan     Moderate episode of recurrent major depressive disorder (H)  Anxiety  History of recurrent anxiety/depression, particularly since the birth of her son and death of her father.  Continues to meet with a psychotherapist but is interested in medication management to help alleviate symptoms.  After discussion, will initiate sertraline 25 mg daily and follow-up in 4 to 6 weeks for reassessment and possible dose adjustment.  - sertraline (ZOLOFT) 25 MG tablet  Dispense: 30 tablet; Refill: 1    Class 1 obesity due to excess calories without serious comorbidity with body mass index (BMI) of 32.0 to 32.9 in adult    BMI  Estimated body mass index is 32.76 kg/m  as calculated from the following:    Height as of this encounter: 1.67 m (5' 5.75\").    Weight as of this encounter: 91.4 kg (201 lb 6.4 oz).   Weight management plan: Patient referred to endocrine and/or weight management specialty Discussed healthy diet and exercise guidelines  - Adult Comprehensive Weight Management  Referral    Family history of cancer  Family history significant for father who passed from neuroendocrine tumor.  She is interested in assessing her risk so we will place genetics referral.  - Adult Genetics & Metabolism  Referral      The longitudinal plan of care for the diagnosis(es)/condition(s) as documented were addressed during this visit. Due to the added complexity in care, I will continue to support Cassidy in the subsequent management and with ongoing continuity of care.      Subjective   Cassidy is a 24 year old, presenting for the following health issues:  Establish Care        9/16/2024    10:55 AM   Additional Questions   Roomed by Sonja GRAHAM LPN     History of Present Illness       Reason for visit:  Establish a primary care doctor   She is taking medications regularly.      PHQ-9 score:        9/16/2024    10:52 AM   PHQ   PHQ-9 Total Score 12   Q9: Thoughts of better off dead/self-harm past 2 weeks " "Not at all         9/16/2024    10:53 AM   SIRISHA-7 SCORE   Total Score 12 (moderate anxiety)   Total Score 12       History of anxiety/depression.  Attempted suicide in 2017 via amitriptyline ingestion.  Now with her partner whom they share a 20  month old son, Eddi. Jan 2023 born, April dad passed away neuroendocrine tumor. Father of baby involved. Multiple moves since the birth of her son. Financial difficulties as well. Now living with her partner's family. Her family is less involved. Desires more kids, not on contraceptive due to hormonal/emotional changes. 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  Some conflict between parenting styles and culturally between their family and her partner's family.  This is caused some tension.  Needs with a psychotherapist which is helpful.  Does not recall being on medication for anxiety/depression in the past.    Struggling with weight gain since the birth of her son.  Feels she eats relatively healthy with appropriate fruits, vegetables, proteins.  Has been reducing a portion sizes.  May be interested in medical weight loss management.        Objective    /56   Pulse 82   Temp 98.1  F (36.7  C) (Oral)   Resp 20   Ht 1.67 m (5' 5.75\")   Wt 91.4 kg (201 lb 6.4 oz)   LMP 06/22/2024 (Approximate)   SpO2 99%   BMI 32.76 kg/m    Body mass index is 32.76 kg/m .  Physical Exam   GENERAL: alert and no distress  RESP: lungs clear to auscultation - no rales, rhonchi or wheezes  CV: regular rate and rhythm, normal S1 S2, no S3 or S4, no murmur, click or rub, no peripheral edema  PSYCH: mentation appears normal, depressed mood, tearful, judgement and insight intact, appearance well groomed          Signed Electronically by: Love Lee, DO    "

## 2024-09-16 NOTE — PROGRESS NOTES
New Patient Oncology Nurse Navigator Note     Referring provider: Love Lee DO      Referring Clinic/Organization:     [facility]        Referred to (specialty:) Genetic Counseling     Requested provider (if applicable): NA     Date Referral Received: September 16, 2024     Evaluation for: Father neuroendocrine tumor, risk eval    Z80.9 (ICD-10-CM) - Family history of cancer      Clinical History (per Nurse review of records provided):      Family history of cancer  Family history significant for father who passed from neuroendocrine tumor.  She is interested in assessing her risk so we will place genetics referral.  - Adult Genetics & Metabolism  Referral     Records Location: See Bookmarked material    Payor: [facility] / Plan: [facility] [employer] / Product Type: PPO /     September 16, 2024  Referral received and reviewed.   Sent to NPS for scheduling.     Ivette CAMERONN, RN, OCN  Oncology Nurse Navigator   [facility]  Cancer Care Service Line   New Patient Hem/Onc Scheduling / Referrals: [phone] (fax: [phone] )

## 2024-09-16 NOTE — PATIENT INSTRUCTIONS
Weight Loss Basics    Remember to:    -Eat 3 meals a day (not 2, not 5) Chew your foodwell/SLOW down  -Eat your protein first  -Be a water drinker/Minize liquid calories (no regular pop, no juice) skim or 1% milk OK  -Sleep 7-8 hours each night. Address sleep if problematic  -Stress management isimportant. Address if problematic  -Move-8000 steps daily Muscle: maintain your muscle mass (strength training 2X/wk)  -Wheat, not white (bread, pasta, crackers, catarino, bagels, tortillas, rice)  -Limit restaurant,cafeteria, take out, drive through to 2 times per week or less  -Minimize caffeine, alcohol, and night-time snacking  -Consider keeping a food diary (i.e. My Fitness Pal, Lose It, or other food tracker)  -Follow upwith the dietitian      **Some lean proteins: chicken, turkey, tuna, salmon, crab, fish, shrimp, scallops, lobster, lean cuts of beef and pork, luncheon meats, veggie burgers, beans (black, lima, garbanzo, lopez,kidney, refried), chile, cottage cheese, string cheese, other cheese, eggs, tofu, peanut butter, nuts, vegan crumbles, greek yogurt        Below are resources in case you experience an increase in symptoms or feel you are in crisis:     Acute Emergency / Crisis  If you are having an acute psychiatric emergency, please call 911 or have someone drive you directly to a hospital for further evaluation.    Mental Health Crisis Lines    [facility]:     Adult Crisis Line (102-078-0517)    Children's Crisis Line (237-532-1251)  [facility]:  Crisis Connection  (907.625.9284)      Urgent Care   [address] [place] [address]   ([phone])   Provides mental health crisis assessments  Walk-in appointments are available     Additional resources  -Local 24 hour Crisis Line: 1-152.398.2965   -National Suicide Prevention Life Line 1-966-778-TALK (6144), www.suicidepreventionlifeline.org  - National [place] of [facility] Illness (www.mn.cinthya.org) 553.608.7154 or 1-698.220.7273  - Suicide Awareness  Voices of Education (SAVE) (www.save.org) 8-734-556-SAVE (9535)   - Substance Abuse and Mental Health Services Administration (SAMHSA) www.samhsa.gov 24 hour helpline: 8-237-585 HELP (6627)  - Narcotics Anonymous (www.namim[place].org) 24 hours [facility] Helpline 1-161.927.6591  - [facility] Alcoholic Anonymous [facility] 24 hour phone line 719-684-0115  - Alcoholics Anonymous (www.alcoholics-anonymous.org)  - [place] Recovery Connection ([facility]). [facility] connects people seeking recovery to resources that help foster and sustain long-term recovery. Whether you are seeking resources for treatment, transpiortation, housing, job training, education, health or other pathways to recovery, [facility] is a great place to start. 866.573.3571 www.[place] [facility].org    Health Tips:  - Create a daily schedule  - Eat Healthy  - Do at least one enjoyable activity each day  - Take medications as prescribed  - Get regular sleep at least 8 hours per night  - Practice relaxation  - Spend time with supportive people  - Helpful phone apps: Positive Penguins (for kids), Headspace, Calm, CBT-I, [place] (great for sleep), Breath to Relax

## 2024-09-23 ENCOUNTER — OFFICE VISIT (OUTPATIENT)
Dept: ORTHOPEDICS | Facility: CLINIC | Age: 24
End: 2024-09-23
Attending: PEDIATRICS
Payer: COMMERCIAL

## 2024-09-23 VITALS
DIASTOLIC BLOOD PRESSURE: 73 MMHG | BODY MASS INDEX: 32.3 KG/M2 | HEART RATE: 79 BPM | WEIGHT: 201 LBS | HEIGHT: 66 IN | RESPIRATION RATE: 18 BRPM | SYSTOLIC BLOOD PRESSURE: 116 MMHG

## 2024-09-23 DIAGNOSIS — M67.432 GANGLION CYST OF DORSUM OF LEFT WRIST: Primary | ICD-10-CM

## 2024-09-23 DIAGNOSIS — M67.432 GANGLION CYST OF WRIST, LEFT: ICD-10-CM

## 2024-09-23 PROCEDURE — 99243 OFF/OP CNSLTJ NEW/EST LOW 30: CPT | Performed by: ORTHOPAEDIC SURGERY

## 2024-09-23 NOTE — PATIENT INSTRUCTIONS
Surgery:  left dorsal ganglion cyst excision.    Preop physical with primary physician is needed within 30 days of the surgery.  Nothing to eat or drink for 8 hours before surgery.  For same day surgery you need a ride.  Someone should stay with you for 12 hours afterward.  Stop blood thinners 5 days before surgery (aspirin, warfarin, anti-inflammatories).      Surgery schedulers will call you to schedule surgery.    If you don't get a call in the next few days, then call [phone] to schedule your surgery for [place] or [phone] to schedule for [place].    64

## 2024-09-23 NOTE — LETTER
2024      Cassidy Fernandes  [address] [place] [address]      Dear Colleague,    Thank you for referring your patient, Cassidy Fernandes, to the [facility]. Please see a copy of my visit note below.    Cassidy Fernandes is a 24 year old female who is seen in consultation at the request of Dr. Diamond for a lump on the dorsum of the left wrist.  She has had this for years.  It tends to get larger and smaller at times.  She has aching constant pain rated up to 4-5 out of 10.  She has used a brace and medications to help with pain.  She is right-hand dominant .    MRI scan has been performed showing a multiloculated cystic structure against the dorsal aspect of the left wrist joint.  It is deep to the tendons, but appears to be outside the wrist capsule.    Past Medical History:   Diagnosis Date     Contact dermatitis and other eczema, due to unspecified cause      Depressive disorder      Herpes simplex virus (HSV) infection      Migraines      Mononucleosis     with hospitalization     Uncomplicated asthma        Past Surgical History:   Procedure Laterality Date      SECTION N/A 1/10/2023    Procedure:  SECTION;  Surgeon: Brandi Cortez MD;  Location: [facility] OR     IR BLOOD PATCH  2023       Family History   Problem Relation Age of Onset     Personality Disorder Mother         borderline     Other Cancer Father         neuroendocrine pancreatic cancer     Cancer Father      Depression Sister      Anxiety Disorder Sister      No Known Problems Sister      No Known Problems Sister      Hemochromatosis Maternal Grandmother      Breast Cancer Maternal Grandmother      Suicide Maternal Grandfather      Cancer - colorectal Paternal Grandmother      Thyroid Disease Paternal Grandmother         unknown type     No Known Problems Maternal Half-Brother      C.A.D. No family hx of      Diabetes No family hx of      Hypertension No family hx  of      Cerebrovascular Disease No family hx of      Prostate Cancer No family hx of        Social History     Socioeconomic History     Marital status: Single     Spouse name: Javy     Number of children: 1     Years of education: Not on file     Highest education level: Not on file   Occupational History     Not on file   Tobacco Use     Smoking status: Never     Passive exposure: Yes     Smokeless tobacco: Never   Vaping Use     Vaping status: Never Used   Substance and Sexual Activity     Alcohol use: Not Currently     Drug use: Not Currently     Types: Marijuana     Comment: x1 in high school     Sexual activity: Yes     Partners: Male     Birth control/protection: None   Other Topics Concern     Not on file   Social History Narrative     Not on file     Social Determinants of Health     Financial Resource Strain: Low Risk  (1/31/2024)    Financial Resource Strain      Within the past 12 months, have you or your family members you live with been unable to get utilities (heat, electricity) when it was really needed?: No   Food Insecurity: Low Risk  (1/31/2024)    Food Insecurity      Within the past 12 months, did you worry that your food would run out before you got money to buy more?: No      Within the past 12 months, did the food you bought just not last and you didn t have money to get more?: No   Transportation Needs: Low Risk  (1/31/2024)    Transportation Needs      Within the past 12 months, has lack of transportation kept you from medical appointments, getting your medicines, non-medical meetings or appointments, work, or from getting things that you need?: No   Physical Activity: Not on file   Stress: Not on file   Social Connections: Not on file   Interpersonal Safety: Low Risk  (9/16/2024)    Interpersonal Safety      Do you feel physically and emotionally safe where you currently live?: Yes      Within the past 12 months, have you been hit, slapped, kicked or otherwise physically hurt by someone?:  "No      Within the past 12 months, have you been humiliated or emotionally abused in other ways by your partner or ex-partner?: No   Housing Stability: Low Risk  (1/31/2024)    Housing Stability      Do you have housing? : Yes      Are you worried about losing your housing?: No       Current Outpatient Medications   Medication Sig Dispense Refill     Emollient (CERAVE) CREA Apply to skin once daily 454 g 11     halobetasol (ULTRAVATE) 0.05 % ointment Apply topically 2 times a day for 14 days, then once a day for 14 days. 150 g 0     sertraline (ZOLOFT) 25 MG tablet Take 1 tablet (25 mg) by mouth daily. 30 tablet 1       Allergies   Allergen Reactions     Seasonal Allergies        REVIEW OF SYSTEMS:  CONSTITUTIONAL:  NEGATIVE for fever, chills, change in weight, not feeling tired  SKIN:  NEGATIVE for worrisome rashes, no skin lumps, no skin ulcers and no non-healing wounds  EYES:  NEGATIVE for vision changes or irritation.  ENT/MOUTH:  NEGATIVE.  No hearing loss, no hoarseness, no difficulty swallowing.  RESP:  NEGATIVE. No cough or shortness of breath.  CV:  NEGATIVE for chest pain, palpitations or peripheral edema  GI:  NEGATIVE for nausea, abdominal pain, heartburn, or change in bowel habits  :  Negative. No dysuria, no hematuria  MUSCULOSKELETAL:  See HPI above  NEURO:  NEGATIVE . No headaches, no dizziness,  no numbness  ENDOCRINE:  NEGATIVE for temperature intolerance, skin/hair changes  HEME/ALLERGY/IMMUNE:  NEGATIVE for bleeding problems  PSYCHIATRIC:  NEGATIVE. no anxiety, no depression.     Exam:  Vitals: /73   Pulse 79   Resp 18   Ht 1.67 m (5' 5.75\")   Wt 91.2 kg (201 lb)   LMP 06/22/2024 (Approximate)   BMI 32.69 kg/m    BMI= Body mass index is 32.69 kg/m .  Constitutional:  healthy, alert and no distress  Neuro: Alert and Oriented x 3, no focal defects.  Psych: Affect normal   Respiratory: Breathing not labored.  Cardiovascular: normal peripheral pulses  Lymph: no adenopathy  Skin: No " rashes,worrisome lesions or skin problems  She has a small fairly flat swelling over the dorsum of the wrist.  She is tender here.  She is some pain with wrist flexion and extension.  There is no mass of the tendons.  Sensation, motor and circulation are intact.    Assessment;  left dorsal ganglion cyst with a multiloculated cyst.  Plan: She has not progressed with anti-inflammatories and brace.  She would like to have surgical excision of the cyst, as it is causing pain with activity.  There is a multiloculated cyst, and I feel it does have some increased chance of returning.  We discussed risks and benefits.  Will proceed with surgical excision with [place] block in same-day surgery    Again, thank you for allowing me to participate in the care of your patient.        Sincerely,        Collin Bright MD

## 2024-09-24 ENCOUNTER — TELEPHONE (OUTPATIENT)
Dept: ORTHOPEDICS | Facility: CLINIC | Age: 24
End: 2024-09-24
Payer: COMMERCIAL

## 2024-09-24 NOTE — TELEPHONE ENCOUNTER
Type of surgery: Ganglion cyst excision  Location of surgery: [facility]  Date and time of surgery: 11-1-24  Surgeon: Kaila  Pre-Op Appt Date: 10-21-24  Post-Op Appt Date:  11-11-24  Packet sent out: Yes  Pre-cert/Authorization completed:    Date:

## 2024-09-24 NOTE — PROGRESS NOTES
Cassidy Fernandes is a 24 year old female who is seen in consultation at the request of Dr. Diamond for a lump on the dorsum of the left wrist.  She has had this for years.  It tends to get larger and smaller at times.  She has aching constant pain rated up to 4-5 out of 10.  She has used a brace and medications to help with pain.  She is right-hand dominant .    MRI scan has been performed showing a multiloculated cystic structure against the dorsal aspect of the left wrist joint.  It is deep to the tendons, but appears to be outside the wrist capsule.    Past Medical History:   Diagnosis Date    Contact dermatitis and other eczema, due to unspecified cause     Depressive disorder     Herpes simplex virus (HSV) infection     Migraines     Mononucleosis     with hospitalization    Uncomplicated asthma        Past Surgical History:   Procedure Laterality Date     SECTION N/A 1/10/2023    Procedure:  SECTION;  Surgeon: Brandi Cortez MD;  Location: [facility] OR    IR BLOOD PATCH  2023       Family History   Problem Relation Age of Onset    Personality Disorder Mother         borderline    Other Cancer Father         neuroendocrine pancreatic cancer    Cancer Father     Depression Sister     Anxiety Disorder Sister     No Known Problems Sister     No Known Problems Sister     Hemochromatosis Maternal Grandmother     Breast Cancer Maternal Grandmother     Suicide Maternal Grandfather     Cancer - colorectal Paternal Grandmother     Thyroid Disease Paternal Grandmother         unknown type    No Known Problems Maternal Half-Brother     C.A.D. No family hx of     Diabetes No family hx of     Hypertension No family hx of     Cerebrovascular Disease No family hx of     Prostate Cancer No family hx of        Social History     Socioeconomic History    Marital status: Single     Spouse name: Javy    Number of children: 1    Years of education: Not on file    Highest education  level: Not on file   Occupational History    Not on file   Tobacco Use    Smoking status: Never     Passive exposure: Yes    Smokeless tobacco: Never   Vaping Use    Vaping status: Never Used   Substance and Sexual Activity    Alcohol use: Not Currently    Drug use: Not Currently     Types: Marijuana     Comment: x1 in high school    Sexual activity: Yes     Partners: Male     Birth control/protection: None   Other Topics Concern    Not on file   Social History Narrative    Not on file     Social Determinants of Health     Financial Resource Strain: Low Risk  (1/31/2024)    Financial Resource Strain     Within the past 12 months, have you or your family members you live with been unable to get utilities (heat, electricity) when it was really needed?: No   Food Insecurity: Low Risk  (1/31/2024)    Food Insecurity     Within the past 12 months, did you worry that your food would run out before you got money to buy more?: No     Within the past 12 months, did the food you bought just not last and you didn t have money to get more?: No   Transportation Needs: Low Risk  (1/31/2024)    Transportation Needs     Within the past 12 months, has lack of transportation kept you from medical appointments, getting your medicines, non-medical meetings or appointments, work, or from getting things that you need?: No   Physical Activity: Not on file   Stress: Not on file   Social Connections: Not on file   Interpersonal Safety: Low Risk  (9/16/2024)    Interpersonal Safety     Do you feel physically and emotionally safe where you currently live?: Yes     Within the past 12 months, have you been hit, slapped, kicked or otherwise physically hurt by someone?: No     Within the past 12 months, have you been humiliated or emotionally abused in other ways by your partner or ex-partner?: No   Housing Stability: Low Risk  (1/31/2024)    Housing Stability     Do you have housing? : Yes     Are you worried about losing your housing?: No  "      Current Outpatient Medications   Medication Sig Dispense Refill    Emollient (CERAVE) CREA Apply to skin once daily 454 g 11    halobetasol (ULTRAVATE) 0.05 % ointment Apply topically 2 times a day for 14 days, then once a day for 14 days. 150 g 0    sertraline (ZOLOFT) 25 MG tablet Take 1 tablet (25 mg) by mouth daily. 30 tablet 1       Allergies   Allergen Reactions    Seasonal Allergies        REVIEW OF SYSTEMS:  CONSTITUTIONAL:  NEGATIVE for fever, chills, change in weight, not feeling tired  SKIN:  NEGATIVE for worrisome rashes, no skin lumps, no skin ulcers and no non-healing wounds  EYES:  NEGATIVE for vision changes or irritation.  ENT/MOUTH:  NEGATIVE.  No hearing loss, no hoarseness, no difficulty swallowing.  RESP:  NEGATIVE. No cough or shortness of breath.  CV:  NEGATIVE for chest pain, palpitations or peripheral edema  GI:  NEGATIVE for nausea, abdominal pain, heartburn, or change in bowel habits  :  Negative. No dysuria, no hematuria  MUSCULOSKELETAL:  See HPI above  NEURO:  NEGATIVE . No headaches, no dizziness,  no numbness  ENDOCRINE:  NEGATIVE for temperature intolerance, skin/hair changes  HEME/ALLERGY/IMMUNE:  NEGATIVE for bleeding problems  PSYCHIATRIC:  NEGATIVE. no anxiety, no depression.     Exam:  Vitals: /73   Pulse 79   Resp 18   Ht 1.67 m (5' 5.75\")   Wt 91.2 kg (201 lb)   LMP 06/22/2024 (Approximate)   BMI 32.69 kg/m    BMI= Body mass index is 32.69 kg/m .  Constitutional:  healthy, alert and no distress  Neuro: Alert and Oriented x 3, no focal defects.  Psych: Affect normal   Respiratory: Breathing not labored.  Cardiovascular: normal peripheral pulses  Lymph: no adenopathy  Skin: No rashes,worrisome lesions or skin problems  She has a small fairly flat swelling over the dorsum of the wrist.  She is tender here.  She is some pain with wrist flexion and extension.  There is no mass of the tendons.  Sensation, motor and circulation are intact.    Assessment;  left " dorsal ganglion cyst with a multiloculated cyst.  Plan: She has not progressed with anti-inflammatories and brace.  She would like to have surgical excision of the cyst, as it is causing pain with activity.  There is a multiloculated cyst, and I feel it does have some increased chance of returning.  We discussed risks and benefits.  Will proceed with surgical excision with [place] block in same-day surgery

## 2024-09-25 ENCOUNTER — OFFICE VISIT (OUTPATIENT)
Dept: PSYCHIATRY | Facility: CLINIC | Age: 24
End: 2024-09-25
Payer: COMMERCIAL

## 2024-09-25 DIAGNOSIS — F33.1 MAJOR DEPRESSIVE DISORDER, RECURRENT, MODERATE (H): Primary | ICD-10-CM

## 2024-09-25 DIAGNOSIS — F43.10 POSTTRAUMATIC STRESS DISORDER: ICD-10-CM

## 2024-09-25 NOTE — PROGRESS NOTES
"OUTPATIENT PSYCHOTHERAPY PROGRESS NOTE    Client Name: Cassidy Fernandes   YOB: 2000 (24 year old)   Date of Service:  9/25/24  Time of Service: 5:11pm to 6:11pm (60 minutes)  Service Type(s): 05988 psychotherapy (53-60 min. w/ patient and/or family)  Type of service: In-person at [facility] clinic     Diagnoses:   F33.1 Major Depressive Disorder, recurrent episode, moderate severity  F43.10 Post-Traumatic Stress Disorder     Other: interpersonal/relationship stressors, postpartum adjustment     Individuals Present:   Cassidy    Treatment goal(s) being addressed:   Decrease symptoms depression/anger  Increase self-care and coping skills (michael, fulfillment, hobbies, social support)  Improve interpersonal effectiveness (boundaries, communication/conflict resolution, self-advocacy)    Subjective:  Cassidy provided updates from the past two weeks. 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  It was stressful to leave a position that provided steady income, but she felt she could not advance in real estate without doing so. Things have been better at home now that she and her mother-in-law are communicating better, so she and her partner feel it is better to stay in the home and save money.  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 Cassidy is already meeting people and booking new clients, so she is pleased and relieved about that.    Cassidy found a PCP and has started a trial of sertraline. She has also scheduled wrist surgery.    Mood has been variable appropriate to the context of significant change over the past week, but overall, Cassidy feels that her mood and anxiety are improving gradually and are stabilizing.    Treatment:   Treatment modality is cognitive behavioral therapy and " supportive psychotherapy. Session focused on processing thoughts and feelings around significant changes around employment and goals for work and home timeline. Therapeutic strategies used include: reflective listening, validation, small-goal setting.     Assessment and Progress:   Behavioral Observations: Cassidy presented with euthymic affect that remained regulate throughout.  Cassidy was open and reflective throughout the session. She was receptive to and appreciative of therapeutic support provided today.    Assessment: Cassidy's mood is gradually improving and stabilizing. Interpersonal conflict within the home is also gradually improving. There are no safety concerns (Cassidy denies ideation, plan, or intent to harm self or others). Cassidy is showing gradual progress towards treatment goals.     Plan:   Next therapy appointment has been scheduled for 10/2/24 to continue work on treatment goals.      Anna Marie Gonzalez, PhD,   Clinical Psychologist      Treatment Plan review due: 10/18/24

## 2024-10-01 NOTE — PROGRESS NOTES
OUTPATIENT PSYCHOTHERAPY PROGRESS NOTE    Client Name: Cassidy Fernandes   YOB: 2000 (24 year old)   Date of Service:  10/02/24  Time of Service: 3:00pm to 4:00pm (60 minutes)  Service Type(s): 67469 psychotherapy (53-60 min. w/ patient and/or family)  Type of service: In-person at [facility] clinic     Diagnoses:   F33.1 Major Depressive Disorder, recurrent episode, moderate severity  F43.10 Post-Traumatic Stress Disorder     Other: interpersonal/relationship stressors, postpartum adjustment     Individuals Present:   Cassidy    Treatment goal(s) being addressed:   Decrease symptoms depression/anger  Increase self-care and coping skills (michael, fulfillment, hobbies, social support)  Improve interpersonal effectiveness (boundaries, communication/conflict resolution, self-advocacy)    Subjective:  Cassidy provided updates from the past week. 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 She is grateful for her colleague who has taken on a mentor role for Cassidy and has been incredible helpful and supportive.    While things in the home had been more stable for a while, this morning Cassidy received a text from her sister-in-law that she interpreted to be confrontational and critical of Cassidy, which upset her and triggered a conflict. Her in-laws have proposed that everyone sit down together this evening to discuss their interpersonal challenges.    Cassidy is worried that the conversation will not go well.  She is prepared to move out with her partner and son if necessary. Cassidy is feeling that the tension that has built over the past several months since they moved in is finally coming to a head.    Treatment:   Treatment modality is cognitive behavioral therapy and supportive psychotherapy. Session focused on processing thoughts and feelings around recent successes and challenges. Processed upsetting text.  Discussed other possible interpretations.  Planned for  conversation with family members tonight (being open minded, regulated, seeing this as an opportunity for healthy communication and problem solving). Therapeutic strategies used include: reflective listening, validation, conflict resolution and communication skills.     Assessment and Progress:   Behavioral Observations: Cassidy presented with variable affect appropriate to context that remained regulate throughout (e.g., frustrated when recounting text and conflict).  Cassidy was open and reflective throughout the session. She was receptive to therapeutic support provided today.    Assessment: Cassidy is doing well overall. Conflict with in-laws has resumed, however, there is an opportunity for all to address this evening if Cassidy is able to enter the conversation non-defensively and stay regulated and open.    Cassidy's mood continues to gradually improve and stabilize. There are no safety concerns (Cassidy denies ideation, plan, or intent to harm self or others). Cassidy is showing gradual progress towards treatment goals.     Plan:   Next therapy appointment has been scheduled for 10/23/24 to continue work on treatment goals.      Anna Marie Gonzalez, PhD,   Clinical Psychologist      Treatment Plan review due: 10/18/24

## 2024-10-02 ENCOUNTER — OFFICE VISIT (OUTPATIENT)
Dept: PSYCHIATRY | Facility: CLINIC | Age: 24
End: 2024-10-02
Payer: COMMERCIAL

## 2024-10-02 DIAGNOSIS — F33.1 MAJOR DEPRESSIVE DISORDER, RECURRENT, MODERATE (H): Primary | ICD-10-CM

## 2024-10-02 DIAGNOSIS — F43.10 POSTTRAUMATIC STRESS DISORDER: ICD-10-CM

## 2024-10-02 PROCEDURE — 90837 PSYTX W PT 60 MINUTES: CPT | Performed by: PSYCHOLOGIST

## 2024-10-08 DIAGNOSIS — F33.1 MODERATE EPISODE OF RECURRENT MAJOR DEPRESSIVE DISORDER (H): ICD-10-CM

## 2024-10-08 DIAGNOSIS — F41.9 ANXIETY: ICD-10-CM

## 2024-10-08 RX ORDER — SERTRALINE HYDROCHLORIDE 25 MG/1
25 TABLET, FILM COATED ORAL DAILY
Qty: 90 TABLET | Refills: 3 | Status: SHIPPED | OUTPATIENT
Start: 2024-10-08

## 2024-10-16 NOTE — TELEPHONE ENCOUNTER
Patient is seeing Jen Oh tomorrow. Should be fine to do a strep test as well. Patient should make sure to come a few minutes early so the test can start running in the lab.   Kourtney Castro PA-C     warm

## 2024-10-21 ENCOUNTER — OFFICE VISIT (OUTPATIENT)
Dept: FAMILY MEDICINE | Facility: CLINIC | Age: 24
End: 2024-10-21
Payer: COMMERCIAL

## 2024-10-21 VITALS
RESPIRATION RATE: 16 BRPM | OXYGEN SATURATION: 100 % | SYSTOLIC BLOOD PRESSURE: 110 MMHG | DIASTOLIC BLOOD PRESSURE: 73 MMHG | WEIGHT: 198 LBS | BODY MASS INDEX: 32.2 KG/M2 | TEMPERATURE: 98.4 F | HEART RATE: 82 BPM

## 2024-10-21 DIAGNOSIS — F33.42 RECURRENT MAJOR DEPRESSIVE DISORDER, IN FULL REMISSION (H): ICD-10-CM

## 2024-10-21 DIAGNOSIS — Z01.818 PREOP GENERAL PHYSICAL EXAM: Primary | ICD-10-CM

## 2024-10-21 DIAGNOSIS — M67.432 GANGLION CYST OF WRIST, LEFT: ICD-10-CM

## 2024-10-21 PROBLEM — Z98.891 S/P CESAREAN SECTION: Status: RESOLVED | Noted: 2023-01-10 | Resolved: 2024-10-21

## 2024-10-21 PROBLEM — F43.23 ADJUSTMENT DISORDER WITH MIXED ANXIETY AND DEPRESSED MOOD: Status: RESOLVED | Noted: 2018-01-09 | Resolved: 2024-10-21

## 2024-10-21 PROBLEM — Z00.00 PREVENTATIVE HEALTH CARE: Status: RESOLVED | Noted: 2024-09-05 | Resolved: 2024-10-21

## 2024-10-21 PROBLEM — Z30.8 ENCOUNTER FOR OTHER CONTRACEPTIVE MANAGEMENT: Status: RESOLVED | Noted: 2023-01-27 | Resolved: 2024-10-21

## 2024-10-21 LAB — HGB BLD-MCNC: 13.2 G/DL (ref 11.7–15.7)

## 2024-10-21 PROCEDURE — 85018 HEMOGLOBIN: CPT | Performed by: FAMILY MEDICINE

## 2024-10-21 PROCEDURE — 80048 BASIC METABOLIC PNL TOTAL CA: CPT | Performed by: FAMILY MEDICINE

## 2024-10-21 PROCEDURE — 99213 OFFICE O/P EST LOW 20 MIN: CPT | Mod: 25 | Performed by: FAMILY MEDICINE

## 2024-10-21 PROCEDURE — 90677 PCV20 VACCINE IM: CPT | Performed by: FAMILY MEDICINE

## 2024-10-21 PROCEDURE — 36415 COLL VENOUS BLD VENIPUNCTURE: CPT | Performed by: FAMILY MEDICINE

## 2024-10-21 PROCEDURE — 90471 IMMUNIZATION ADMIN: CPT | Performed by: FAMILY MEDICINE

## 2024-10-21 ASSESSMENT — PATIENT HEALTH QUESTIONNAIRE - PHQ9: SUM OF ALL RESPONSES TO PHQ QUESTIONS 1-9: 1

## 2024-10-21 NOTE — PROGRESS NOTES
Preoperative Evaluation  [facility]  [address] [facility] [address]  Phone: [phone]  Fax: [phone]  Primary Provider: Love Lee DO  Pre-op Performing Provider: Love Lee DO  Oct 21, 2024             10/21/2024   Surgical Information   What procedure is being done? ganglion cyst excision   Facility or Hospital where procedure/surgery will be performed: [facility]    Who is doing the procedure / surgery? Collin Bright   Date of surgery / procedure: Nov. 1st   Time of surgery / procedure: in the morning   Where do you plan to recover after surgery? at home with family        Fax number for surgical facility: Note does not need to be faxed, will be available electronically in Epic.    Assessment & Plan     The proposed surgical procedure is considered INTERMEDIATE risk.    Preop general physical exam  Ganglion cyst of wrist, left  Undergoing left ganglion cyst excision.    - Hemoglobin  - Basic metabolic panel  (Ca, Cl, CO2, Creat, Gluc, K, Na, BUN)    Recurrent major depressive disorder, in full remission (H)  Notable improvement in mood since initiating sertraline.  Continue current dose and follow-up in 1 year for refills or sooner for dose adjustments.    The longitudinal plan of care for the diagnosis(es)/condition(s) as documented were addressed during this visit. Due to the added complexity in care, I will continue to support Cassidy in the subsequent management and with ongoing continuity of care.      Subjective   Cassidy is a 24 year old, presenting for the following:  Pre-Op Exam          10/21/2024    12:16 PM   Additional Questions   Roomed by Pennie YARBROUGH CMA         10/21/2024   Forms   Any forms needing to be completed Yes      HPI related to upcoming procedure:   Ganglion cyst of the left dorsal wrist, intermittently painful.  Waxes and wanes in size.  Noted to have multiple fluid-filled sacs on imaging.  Uses NSAIDs/ice as needed.         10/21/2024   Pre-Op Questionnaire   Have you ever had a heart attack or stroke? No   Have you ever had surgery on your heart or blood vessels, such as a stent placement, a coronary artery bypass, or surgery on an artery in your head, neck, heart, or legs? No   Do you have chest pain with activity? No   Do you have a history of heart failure? No   Do you currently have a cold, bronchitis or symptoms of other infection? (!) UNKNOWN, residual cough from recent URI.  Overall improved.   Do you have a cough, shortness of breath, or wheezing? No   Do you or anyone in your family have previous history of blood clots? No   Do you or does anyone in your family have a serious bleeding problem such as prolonged bleeding following surgeries or cuts? No   Have you ever had problems with anemia or been told to take iron pills? No   Have you had any abnormal blood loss such as black, tarry or bloody stools, or abnormal vaginal bleeding? No   Have you ever had a blood transfusion? No   Are you willing to have a blood transfusion if it is medically needed before, during, or after your surgery? Yes   Have you or any of your relatives ever had problems with anesthesia? (!) YES, father needed additional medication for effective anesthesia   Do you have sleep apnea, excessive snoring or daytime drowsiness? No   Do you have any artifical heart valves or other implanted medical devices like a pacemaker, defibrillator, or continuous glucose monitor? No   Do you have artificial joints? No   Are you allergic to latex? No        Health Care Directive  Patient does not have a Health Care Directive or Living Will: Discussed advance care planning with patient; however, patient declined at this time.    Preoperative Review of    reviewed - no record of controlled substances prescribed.      Patient Active Problem List    Diagnosis Date Noted    Ganglion cyst of dorsum of left wrist 09/23/2024     Priority: Medium    History of  pre-eclampsia 2023     Priority: Medium    Major depressive disorder, recurrent, moderate (H) 2018     Priority: Medium    Posttraumatic stress disorder 2018     Priority: Medium    Amitriptyline overdose 2017     Priority: Medium      Past Medical History:   Diagnosis Date    Contact dermatitis and other eczema, due to unspecified cause     Depressive disorder     Herpes simplex virus (HSV) infection     Migraines     Mononucleosis     with hospitalization    Uncomplicated asthma      Past Surgical History:   Procedure Laterality Date     SECTION N/A 1/10/2023    Procedure:  SECTION;  Surgeon: Brandi Cortez MD;  Location: [facility] OR    IR BLOOD PATCH  2023     Current Outpatient Medications   Medication Sig Dispense Refill    Emollient (CERAVE) CREA Apply to skin once daily 454 g 11    halobetasol (ULTRAVATE) 0.05 % ointment Apply topically 2 times a day for 14 days, then once a day for 14 days. 150 g 0    sertraline (ZOLOFT) 25 MG tablet TAKE 1 TABLET BY MOUTH EVERY DAY 90 tablet 3       Allergies   Allergen Reactions    Seasonal Allergies         Social History     Tobacco Use    Smoking status: Never     Passive exposure: Yes    Smokeless tobacco: Never   Substance Use Topics    Alcohol use: Not Currently     Family History   Problem Relation Age of Onset    Personality Disorder Mother         borderline    Other Cancer Father         neuroendocrine pancreatic cancer    Cancer Father     Depression Sister     Anxiety Disorder Sister     No Known Problems Sister     No Known Problems Sister     Hemochromatosis Maternal Grandmother     Breast Cancer Maternal Grandmother     Suicide Maternal Grandfather     Cancer - colorectal Paternal Grandmother     Thyroid Disease Paternal Grandmother         unknown type    No Known Problems Maternal Half-Brother     C.A.D. No family hx of     Diabetes No family hx of     Hypertension No family hx of     Cerebrovascular  "Disease No family hx of     Prostate Cancer No family hx of      History   Drug Use Unknown     Comment: x1 in high school             Review of Systems  Constitutional, neuro, ENT, endocrine, pulmonary, cardiac, gastrointestinal, genitourinary, musculoskeletal, integument and psychiatric systems are negative, except as otherwise noted.    Objective    /73 (BP Location: Left arm, Patient Position: Sitting, Cuff Size: Adult Large)   Pulse 82   Temp 98.4  F (36.9  C) (Oral)   Resp 16   Wt 89.8 kg (198 lb)   LMP 09/21/2024 (Approximate)   SpO2 100%   BMI 32.20 kg/m     Estimated body mass index is 32.2 kg/m  as calculated from the following:    Height as of 9/23/24: 1.67 m (5' 5.75\").    Weight as of this encounter: 89.8 kg (198 lb).  Physical Exam  GENERAL: alert and no distress  EYES: Eyes grossly normal to inspection, PERRL and conjunctivae and sclerae normal  HENT: ear canals and TM's normal, nose and mouth without ulcers or lesions  NECK: no adenopathy, no asymmetry, masses, or scars  RESP: lungs clear to auscultation - no rales, rhonchi or wheezes  CV: regular rate and rhythm, normal S1 S2, no S3 or S4, no murmur, click or rub, no peripheral edema  ABDOMEN: soft, nontender, no hepatosplenomegaly, no masses and bowel sounds normal  MS: no gross musculoskeletal defects noted, no edema  SKIN: no suspicious lesions or rashes  NEURO: Normal strength and tone, mentation intact and speech normal  PSYCH: mentation appears normal, affect normal/bright    No results for input(s): \"HGB\", \"PLT\", \"INR\", \"NA\", \"POTASSIUM\", \"CR\", \"A1C\" in the last 8760 hours.     Diagnostics  Labs pending at this time.  Results will be reviewed when available.   No EKG required, no history of coronary heart disease, significant arrhythmia, peripheral arterial disease or other structural heart disease.    Revised Cardiac Risk Index (RCRI)  The patient has the following serious cardiovascular risks for perioperative complications:   " - No serious cardiac risks = 0 points     RCRI Interpretation: 0 points: Class I (very low risk - 0.4% complication rate)         Signed Electronically by: Love Lee DO  A copy of this evaluation report is provided to the requesting physician.

## 2024-10-21 NOTE — PROGRESS NOTES
Prior to immunization administration, verified patients identity using patient s name and date of birth. Please see Immunization Activity for additional information.     Screening Questionnaire for Adult Immunization    Are you sick today?   No   Do you have allergies to medications, food, a vaccine component or latex?   No   Have you ever had a serious reaction after receiving a vaccination?   No   Do you have a long-term health problem with heart, lung, kidney, or metabolic disease (e.g., diabetes), asthma, a blood disorder, no spleen, complement component deficiency, a cochlear implant, or a spinal fluid leak?  Are you on long-term aspirin therapy?   No   Do you have cancer, leukemia, HIV/AIDS, or any other immune system problem?   No   Do you have a parent, brother, or sister with an immune system problem?   No   In the past 3 months, have you taken medications that affect  your immune system, such as prednisone, other steroids, or anticancer drugs; drugs for the treatment of rheumatoid arthritis, Crohn s disease, or psoriasis; or have you had radiation treatments?   No   Have you had a seizure, or a brain or other nervous system problem?   No   During the past year, have you received a transfusion of blood or blood    products, or been given immune (gamma) globulin or antiviral drug?   No   For women: Are you pregnant or is there a chance you could become       pregnant during the next month?   No   Have you received any vaccinations in the past 4 weeks?   No     Immunization questionnaire answers were all negative.      Patient instructed to remain in clinic for 15 minutes afterwards, and to report any adverse reactions.     Screening performed by Cinthya Bob MA on 10/21/2024 at 12:38 PM.

## 2024-10-22 LAB
ANION GAP SERPL CALCULATED.3IONS-SCNC: 11 MMOL/L (ref 7–15)
BUN SERPL-MCNC: 8.9 MG/DL (ref 6–20)
CALCIUM SERPL-MCNC: 9.5 MG/DL (ref 8.8–10.4)
CHLORIDE SERPL-SCNC: 105 MMOL/L (ref 98–107)
CREAT SERPL-MCNC: 0.77 MG/DL (ref 0.51–0.95)
EGFRCR SERPLBLD CKD-EPI 2021: >90 ML/MIN/1.73M2
GLUCOSE SERPL-MCNC: 89 MG/DL (ref 70–99)
HCO3 SERPL-SCNC: 24 MMOL/L (ref 22–29)
POTASSIUM SERPL-SCNC: 4.5 MMOL/L (ref 3.4–5.3)
SODIUM SERPL-SCNC: 140 MMOL/L (ref 135–145)

## 2024-10-23 ENCOUNTER — OFFICE VISIT (OUTPATIENT)
Dept: PSYCHIATRY | Facility: CLINIC | Age: 24
End: 2024-10-23
Payer: COMMERCIAL

## 2024-10-23 DIAGNOSIS — F43.10 POSTTRAUMATIC STRESS DISORDER: ICD-10-CM

## 2024-10-23 DIAGNOSIS — F33.1 MAJOR DEPRESSIVE DISORDER, RECURRENT, MODERATE (H): Primary | ICD-10-CM

## 2024-10-23 PROCEDURE — 90837 PSYTX W PT 60 MINUTES: CPT | Performed by: PSYCHOLOGIST

## 2024-10-23 NOTE — PROGRESS NOTES
OUTPATIENT PSYCHOTHERAPY PROGRESS NOTE    Client Name: Cassidy Fernandes   YOB: 2000 (24 year old)   Date of Service:  10/23/24  Time of Service: 5:00pm to 6:00pm (60 minutes)  Service Type(s): 25020 psychotherapy (53-60 min. w/ patient and/or family)  Type of service: In-person at [facility] clinic     Diagnoses:   F33.1 Major Depressive Disorder, recurrent episode, moderate severity  F43.10 Post-Traumatic Stress Disorder     Other: interpersonal/relationship stressors, postpartum adjustment     Individuals Present:   Cassidy    Treatment goal(s) being addressed:   Decrease symptoms depression/anger  Increase self-care and coping skills (michael, fulfillment, hobbies, social support)  Improve interpersonal effectiveness (boundaries, communication/conflict resolution, self-advocacy)    Subjective:  Cassidy provided updates from the past week. She and her partner applied to rent an apartment and plan to move out of his family's home next month. She is looking forward to living together as a family of three for the first time. Cassidy discussed things she anticipates will improve and get easier once they are  their own and things that may be new or ongoing challenges. Specifically, Cassidy discussed conflicting needs around intimacy and household chores/expectations that lead to conflict between she and her partner.    Treatment:   Treatment modality is cognitive behavioral therapy and supportive psychotherapy. Session focused on processing thoughts and feelings around recent successes and challenges. Discussed strategies for addressing competing wants and needs in her relationship with her partner and ways to communicate to address completing needs and compromising. Therapeutic strategies used include: reflective listening, validation, and communication skills.     Assessment and Progress:   Behavioral Observations: Cassidy presented with euthymic affect.  Cassidy was open and reflective throughout the session. She  was receptive to therapeutic support provided today.    Assessment: Cassidy is doing well overall. Mood is stabilizing. She in on the precipice of a significant change (the family unit living on their own), which is exciting and also may introduce some new stressors. There are no safety concerns (Cassidy denies ideation, plan, or intent to harm self or others). Cassidy is showing gradual progress towards treatment goals.     Plan:   Next therapy appointment has been scheduled for 11/06/24 to continue work on treatment goals.      Anna Maire Gonzalez, PhD,   Clinical Psychologist      Treatment Plan review due: 10/18/24

## 2024-10-29 ENCOUNTER — ANESTHESIA EVENT (OUTPATIENT)
Dept: SURGERY | Facility: AMBULATORY SURGERY CENTER | Age: 24
End: 2024-10-29
Payer: COMMERCIAL

## 2024-10-29 NOTE — ANESTHESIA PREPROCEDURE EVALUATION
Anesthesia Pre-Procedure Evaluation    Patient: Cassidy Fernandes   MRN: [id number] : 2000        Procedure : Procedure(s):  EXCISION, GANGLION CYST, WRIST          Past Medical History:   Diagnosis Date    Contact dermatitis and other eczema, due to unspecified cause     Depressive disorder     Herpes simplex virus (HSV) infection     Migraines     Mononucleosis     with hospitalization    Uncomplicated asthma       Past Surgical History:   Procedure Laterality Date     SECTION N/A 1/10/2023    Procedure:  SECTION;  Surgeon: Brandi Cortez MD;  Location: [facility] OR    IR BLOOD PATCH  2023      Allergies   Allergen Reactions    Seasonal Allergies       Social History     Tobacco Use    Smoking status: Never     Passive exposure: Yes    Smokeless tobacco: Never   Substance Use Topics    Alcohol use: Not Currently      Wt Readings from Last 1 Encounters:   10/21/24 89.8 kg (198 lb)        Anesthesia Evaluation            ROS/MED HX  ENT/Pulmonary:  - neg pulmonary ROS     Neurologic:  - neg neurologic ROS     Cardiovascular:  - neg cardiovascular ROS     METS/Exercise Tolerance:     Hematologic:  - neg hematologic  ROS     Musculoskeletal:  - neg musculoskeletal ROS     GI/Hepatic:  - neg GI/hepatic ROS     Renal/Genitourinary:  - neg Renal ROS     Endo:  - neg endo ROS     Psychiatric/Substance Use:     (+) psychiatric history anxiety and depression   Recreational drug usage: Cannabis.    Infectious Disease:  - neg infectious disease ROS     Malignancy:       Other:            Physical Exam    Airway  airway exam normal      Mallampati: II       Respiratory Devices and Support         Dental       (+) Minor Abnormalities - some fillings, tiny chips      Cardiovascular   cardiovascular exam normal          Pulmonary   pulmonary exam normal                OUTSIDE LABS:  CBC:   Lab Results   Component Value Date    WBC 7.1 2023    WBC 14.5 (H) 2023    HGB 13.2  10/21/2024    HGB 14.0 09/25/2023    HCT 40.9 09/25/2023    HCT 40.7 01/17/2023     09/25/2023     (H) 01/17/2023     BMP:   Lab Results   Component Value Date     10/21/2024     01/17/2023    POTASSIUM 4.5 10/21/2024    POTASSIUM 3.8 01/17/2023    CHLORIDE 105 10/21/2024    CHLORIDE 105 01/17/2023    CO2 24 10/21/2024    CO2 20 (L) 01/17/2023    BUN 8.9 10/21/2024    BUN 14.2 01/17/2023    CR 0.77 10/21/2024    CR 0.76 01/17/2023    GLC 89 10/21/2024     (H) 01/17/2023     COAGS:   Lab Results   Component Value Date    PTT 24 01/10/2023    INR 1.08 01/10/2023     POC:   Lab Results   Component Value Date    HCG Negative 07/30/2024    HCGS Negative 12/14/2017     HEPATIC:   Lab Results   Component Value Date    ALBUMIN 3.9 01/17/2023    PROTTOTAL 7.1 01/17/2023    ALT 38 (H) 01/17/2023    AST 27 01/17/2023    ALKPHOS 178 (H) 01/17/2023    BILITOTAL 0.3 01/17/2023     OTHER:   Lab Results   Component Value Date    A1C 5.4 03/08/2022    FORREST 9.5 10/21/2024    PHOS 3.9 12/14/2017    MAG 2.0 01/17/2023    TSH 1.46 09/25/2023       Anesthesia Plan    ASA Status:  2    NPO Status:  NPO Appropriate    Anesthesia Type: IV Regional Anesthesia.   Induction: N/a.   Maintenance: N/A.        Consents    Anesthesia Plan(s) and associated risks, benefits, and realistic alternatives discussed. Questions answered and patient/representative(s) expressed understanding.     - Discussed: Risks, Benefits and Alternatives for BOTH SEDATION and the PROCEDURE were discussed     - Discussed with:  Patient            Postoperative Care    Pain management: IV analgesics, Oral pain medications, Multi-modal analgesia.   PONV prophylaxis: Ondansetron (or other 5HT-3)     Comments:               Anatoliy Estrella MD    I have reviewed the pertinent notes and labs in the chart from the past 30 days and (re)examined the patient.  Any updates or changes from those notes are reflected in this note.

## 2024-11-01 ENCOUNTER — ANESTHESIA (OUTPATIENT)
Dept: SURGERY | Facility: AMBULATORY SURGERY CENTER | Age: 24
End: 2024-11-01
Payer: COMMERCIAL

## 2024-11-01 ENCOUNTER — HOSPITAL ENCOUNTER (OUTPATIENT)
Facility: AMBULATORY SURGERY CENTER | Age: 24
Discharge: HOME | End: 2024-11-01
Attending: ORTHOPAEDIC SURGERY | Admitting: ORTHOPAEDIC SURGERY
Payer: COMMERCIAL

## 2024-11-01 VITALS
OXYGEN SATURATION: 100 % | SYSTOLIC BLOOD PRESSURE: 110 MMHG | HEART RATE: 80 BPM | DIASTOLIC BLOOD PRESSURE: 70 MMHG | RESPIRATION RATE: 16 BRPM | TEMPERATURE: 97 F

## 2024-11-01 DIAGNOSIS — M67.432 GANGLION CYST OF DORSUM OF LEFT WRIST: Primary | ICD-10-CM

## 2024-11-01 PROCEDURE — 25111 REMOVE WRIST TENDON LESION: CPT | Mod: LT | Performed by: ORTHOPAEDIC SURGERY

## 2024-11-01 PROCEDURE — G8916 PT W IV AB GIVEN ON TIME: HCPCS

## 2024-11-01 PROCEDURE — 25111 REMOVE WRIST TENDON LESION: CPT | Performed by: ANESTHESIOLOGY

## 2024-11-01 PROCEDURE — 25111 REMOVE WRIST TENDON LESION: CPT | Performed by: NURSE ANESTHETIST, CERTIFIED REGISTERED

## 2024-11-01 PROCEDURE — 88304 TISSUE EXAM BY PATHOLOGIST: CPT | Performed by: PATHOLOGY

## 2024-11-01 PROCEDURE — 25111 REMOVE WRIST TENDON LESION: CPT | Mod: LT

## 2024-11-01 PROCEDURE — G8907 PT DOC NO EVENTS ON DISCHARG: HCPCS

## 2024-11-01 RX ORDER — ONDANSETRON 2 MG/ML
4 INJECTION INTRAMUSCULAR; INTRAVENOUS EVERY 30 MIN PRN
Status: DISCONTINUED | OUTPATIENT
Start: 2024-11-01 | End: 2024-11-02 | Stop reason: HOSPADM

## 2024-11-01 RX ORDER — LIDOCAINE HYDROCHLORIDE 5 MG/ML
INJECTION, SOLUTION INFILTRATION; PERINEURAL PRN
Status: DISCONTINUED | OUTPATIENT
Start: 2024-11-01 | End: 2024-11-01

## 2024-11-01 RX ORDER — ONDANSETRON 4 MG/1
4 TABLET, ORALLY DISINTEGRATING ORAL EVERY 30 MIN PRN
Status: DISCONTINUED | OUTPATIENT
Start: 2024-11-01 | End: 2024-11-02 | Stop reason: HOSPADM

## 2024-11-01 RX ORDER — SODIUM CHLORIDE, SODIUM LACTATE, POTASSIUM CHLORIDE, CALCIUM CHLORIDE 600; 310; 30; 20 MG/100ML; MG/100ML; MG/100ML; MG/100ML
INJECTION, SOLUTION INTRAVENOUS CONTINUOUS
Status: DISCONTINUED | OUTPATIENT
Start: 2024-11-01 | End: 2024-11-02 | Stop reason: HOSPADM

## 2024-11-01 RX ORDER — OXYCODONE HYDROCHLORIDE 5 MG/1
10 TABLET ORAL
Status: DISCONTINUED | OUTPATIENT
Start: 2024-11-01 | End: 2024-11-02 | Stop reason: HOSPADM

## 2024-11-01 RX ORDER — NALOXONE HYDROCHLORIDE 0.4 MG/ML
0.1 INJECTION, SOLUTION INTRAMUSCULAR; INTRAVENOUS; SUBCUTANEOUS
Status: DISCONTINUED | OUTPATIENT
Start: 2024-11-01 | End: 2024-11-02 | Stop reason: HOSPADM

## 2024-11-01 RX ORDER — FENTANYL CITRATE 50 UG/ML
25 INJECTION, SOLUTION INTRAMUSCULAR; INTRAVENOUS
Status: DISCONTINUED | OUTPATIENT
Start: 2024-11-01 | End: 2024-11-02 | Stop reason: HOSPADM

## 2024-11-01 RX ORDER — BUPIVACAINE HYDROCHLORIDE 2.5 MG/ML
INJECTION, SOLUTION INFILTRATION; PERINEURAL PRN
Status: DISCONTINUED | OUTPATIENT
Start: 2024-11-01 | End: 2024-11-01 | Stop reason: HOSPADM

## 2024-11-01 RX ORDER — LIDOCAINE 40 MG/G
CREAM TOPICAL
Status: DISCONTINUED | OUTPATIENT
Start: 2024-11-01 | End: 2024-11-02 | Stop reason: HOSPADM

## 2024-11-01 RX ORDER — HYDROCODONE BITARTRATE AND ACETAMINOPHEN 5; 325 MG/1; MG/1
.5-1 TABLET ORAL EVERY 4 HOURS PRN
Qty: 10 TABLET | Refills: 0 | Status: SHIPPED | OUTPATIENT
Start: 2024-11-01

## 2024-11-01 RX ORDER — FENTANYL CITRATE 50 UG/ML
25 INJECTION, SOLUTION INTRAMUSCULAR; INTRAVENOUS EVERY 5 MIN PRN
Status: DISCONTINUED | OUTPATIENT
Start: 2024-11-01 | End: 2024-11-02 | Stop reason: HOSPADM

## 2024-11-01 RX ORDER — FENTANYL CITRATE 50 UG/ML
50 INJECTION, SOLUTION INTRAMUSCULAR; INTRAVENOUS EVERY 5 MIN PRN
Status: DISCONTINUED | OUTPATIENT
Start: 2024-11-01 | End: 2024-11-02 | Stop reason: HOSPADM

## 2024-11-01 RX ORDER — OXYCODONE HYDROCHLORIDE 5 MG/1
5 TABLET ORAL
Status: COMPLETED | OUTPATIENT
Start: 2024-11-01 | End: 2024-11-01

## 2024-11-01 RX ORDER — FENTANYL CITRATE 50 UG/ML
INJECTION, SOLUTION INTRAMUSCULAR; INTRAVENOUS PRN
Status: DISCONTINUED | OUTPATIENT
Start: 2024-11-01 | End: 2024-11-01

## 2024-11-01 RX ORDER — CEFAZOLIN SODIUM 2 G/50ML
2 SOLUTION INTRAVENOUS SEE ADMIN INSTRUCTIONS
Status: DISCONTINUED | OUTPATIENT
Start: 2024-11-01 | End: 2024-11-02 | Stop reason: HOSPADM

## 2024-11-01 RX ORDER — ACETAMINOPHEN 325 MG/1
975 TABLET ORAL ONCE
Status: COMPLETED | OUTPATIENT
Start: 2024-11-01 | End: 2024-11-01

## 2024-11-01 RX ORDER — CEFAZOLIN SODIUM 2 G/50ML
2 SOLUTION INTRAVENOUS
Status: DISCONTINUED | OUTPATIENT
Start: 2024-11-01 | End: 2024-11-02 | Stop reason: HOSPADM

## 2024-11-01 RX ORDER — DEXAMETHASONE SODIUM PHOSPHATE 4 MG/ML
4 INJECTION, SOLUTION INTRA-ARTICULAR; INTRALESIONAL; INTRAMUSCULAR; INTRAVENOUS; SOFT TISSUE
Status: DISCONTINUED | OUTPATIENT
Start: 2024-11-01 | End: 2024-11-02 | Stop reason: HOSPADM

## 2024-11-01 RX ORDER — PROPOFOL 10 MG/ML
INJECTION, EMULSION INTRAVENOUS PRN
Status: DISCONTINUED | OUTPATIENT
Start: 2024-11-01 | End: 2024-11-01

## 2024-11-01 RX ADMIN — ACETAMINOPHEN 975 MG: 325 TABLET ORAL at 07:53

## 2024-11-01 RX ADMIN — FENTANYL CITRATE 50 MCG: 50 INJECTION, SOLUTION INTRAMUSCULAR; INTRAVENOUS at 08:39

## 2024-11-01 RX ADMIN — PROPOFOL 125 MCG/KG/MIN: 10 INJECTION, EMULSION INTRAVENOUS at 08:42

## 2024-11-01 RX ADMIN — PROPOFOL 60 MG: 10 INJECTION, EMULSION INTRAVENOUS at 08:43

## 2024-11-01 RX ADMIN — LIDOCAINE HYDROCHLORIDE 40 ML: 5 INJECTION, SOLUTION INFILTRATION; PERINEURAL at 08:44

## 2024-11-01 RX ADMIN — CEFAZOLIN SODIUM 2 G: 2 SOLUTION INTRAVENOUS at 08:37

## 2024-11-01 RX ADMIN — OXYCODONE HYDROCHLORIDE 5 MG: 5 TABLET ORAL at 10:01

## 2024-11-01 RX ADMIN — SODIUM CHLORIDE, SODIUM LACTATE, POTASSIUM CHLORIDE, CALCIUM CHLORIDE: 600; 310; 30; 20 INJECTION, SOLUTION INTRAVENOUS at 08:37

## 2024-11-01 NOTE — BRIEF OP NOTE
[facility]    Brief Operative Note    Pre-operative diagnosis: Ganglion cyst of dorsum of left wrist [M67.432]  Post-operative diagnosis Same as pre-operative diagnosis    Procedure: EXCISION, GANGLION CYST, WRIST, Left - Wrist    Surgeon: Surgeons and Role:     * Collin Bright MD - Primary     * Ed Orozco PA-C  Anesthesia: Sweetser Block   Estimated Blood Loss: Minimal    Drains: None  Specimens:   ID Type Source Tests Collected by Time Destination   1 : Left Wrist Ganglion Cyst Tissue Wrist, Left SURGICAL PATHOLOGY EXAM Collin Bright MD 11/1/2024  9:09 AM      Findings:   Multiple pre ganglion cysts .  Complications: None.  Implants: * No implants in log *

## 2024-11-01 NOTE — ANESTHESIA CARE TRANSFER NOTE
Patient: Cassidy Fernandes    Procedure: Procedure(s):  EXCISION, GANGLION CYST, WRIST       Diagnosis: Ganglion cyst of dorsum of left wrist [M67.432]  Diagnosis Additional Information: No value filed.    Anesthesia Type:   IV Regional Anesthesia     Note:    Oropharynx: oropharynx clear of all foreign objects and spontaneously breathing  Level of Consciousness: drowsy  Oxygen Supplementation: room air    Independent Airway: airway patency satisfactory and stable  Dentition: dentition unchanged  Vital Signs Stable: post-procedure vital signs reviewed and stable  Report to RN Given: handoff report given  Patient transferred to: Phase II    Handoff Report: Identifed the Patient, Identified the Reponsible Provider, Reviewed the pertinent medical history, Discussed the surgical course, Reviewed Intra-OP anesthesia mangement and issues during anesthesia, Set expectations for post-procedure period and Allowed opportunity for questions and acknowledgement of understanding    Vitals:  Vitals Value Taken Time   BP     Temp     Pulse     Resp     SpO2         Electronically Signed By: MARY De La Cruz CRNA  November 1, 2024  9:41 AM

## 2024-11-01 NOTE — DISCHARGE INSTRUCTIONS
Tylenol 975 mg was given at 8 am.    You should not take more then 4,000 mg of tylenol/acetaminophen in a 24 hour period.     Ganglion Cyst Removal Discharge Instructions                                     [phone]  [facility] Service Line for issues or concerns    Home Prescriptions: have been signed for you.  Tylenol and Ibuprofen as needed.      General Care:  After surgery you may feel tired/sleepy. This is normal. Please have someone stay with you for 24 hours after surgery. You should avoid driving until released by your physician to do so. If you have any question along the way please contact the office. If you feel it is an issue cannot wait for normal office hours, contact the on-call physician.    Bandages:   Keep this bandage clean and dry. You can always loosen the ace wrap if it feels too tight or you continue to have numbness or tingling.  Remove your bandage at 3 days after surgery.  Keep the steri strips on.  You can cover the incision with a band aid after it is removed.  Change the band aid daily until follow up. Then keep the area clean, dry, and covered.    Bathing:  Do not submerge, scrub or soak your incision in water such as a bath or pool. Keep your bandage clean and dry. Keep your incision/splint covered with a sealed bandage when bathing. Saran wrap and a bag works well.     Follow up:  Your follow up appointment should already be scheduled. If its not, please call the office to verify an appointment about 10-14 days after surgery.     Diet:  Start with non-alcoholic liquids at first, particularly water or sports drinks after surgery. Progress to bland foods such as crackers and bread and finally to your normal diet if you have no problems.     Pain control:  Take your pain medications as prescribed (if prescribed). These medications may make you sleepy. Do not drive, operate equipment, or drink alcohol when taking these.  You may take Tylenol (Generic name is acetaminophen) as  directed on the bottle in place of the prescribed pain medications as your pain gets better. You may take NSAIDs (Motrin, Ibuprofen, Aleve, Naproxen) as directed on the bottle for minor discomfort. If the medications cause a reaction such as nausea or skin rash, stop taking them and contact your doctor. Please plan accordingly, pain medications will not be re-filled on the weekends or at night. Call the office during the day if you need more medications.    Physical Therapy/Occupational Therapy:  At your first post-operative visit, your doctor will direct you on your personal therapy program if needed.     Activity:  Keep your hand elevated for the first couple days after surgery. Avoid lifting, pushing, and or pulling with the operated hand.       Normal findings after surgery:  Numbness and tenderness around the incisions is normal.  You may have bruising around the incisions.  Low grade fevers less than 100.5 degrees Fahrenheit are normal.     When to call the Office:  Temperature greater than 101.5 degrees Fahreheit.  Fever, chills, and increasing pain in the hand and wrist.  Excessive drainage from the incisions that include bright red blood.  Drainage from the incisions sites that appear yellow, pus-like, or foul smelling.  Persistent nausea or vomiting.  Any other effects you feel are significant.

## 2024-11-01 NOTE — OP NOTE
Stephanie Hines MD Physician   Procedures    -   DATE OF SERVICE:  2024    SURGEON: STEPHANIE HINES MD   ASSISTANT: Ed Orzoco PA-C     PREOPERATIVE DIAGNOSIS: Left dorsal ganglion cyst   POSTOPERATIVE DIAGNOSIS: Left dorsal ganglion cyst    PROCEDURE PERFORMED: Left excision dorsal ganglion cyst.       HISTORY   This is a 24 year old  female with multi dorsal ganglion cyst   She has failed conservative treatment.   She presents now   for left dorsal ganglion cyst excision.    Assistance of Ed Orozco PA-C was medically necessary given the technical complexity of the case; with assistance with patient positioning, retraction and cyst  exposure,  and wound closure.    DESCRIPTION OF PROCEDURE   After a smooth Vassar Block anesthetic, the patient's left arm was prepped and   draped in sterile fashion. A pause was performed for patient verification.   A transverse incision was made made over the dorsal aspect of the wrist at the main lump.  This was carried down past tendons to expose a cluster of cystic degeneration within the soft tissue.  The borders were very poorly defined.  By retracting the tendons I was able to excise this tissue down to the wrist capsule.  We did have cystic fluid exit several times.  There was no connection down into the wrist joint.  At the conclusion we thoroughly irrigated the wound.  We then injected quarter percent Marcaine in the soft tissue.  The fascia was closed over the tendons with interrupted 3-0 Vicryl suture.  Subcutaneous tissue closed with interrupted 3-0 Vicryl suture.  Skin edges closed with a 3-0 Prolene subcuticular closure.  Sterile dressings were applied.  Tourniquet was deflated  The patient was taken to the recovery room in stable condition.     STEPHANIE HINES MD     cc:   Love Lee   PATIENT: Cassidy Fernandes   MR#: [id number]   : 2000   ADMIT DATE: 2024

## 2024-11-01 NOTE — ANESTHESIA POSTPROCEDURE EVALUATION
Patient: Cassidy Fernandes    Procedure: Procedure(s):  EXCISION, GANGLION CYST, WRIST       Anesthesia Type:  IV Regional Anesthesia    Note:  Disposition: Outpatient   Postop Pain Control: Uneventful            Sign Out: Well controlled pain   PONV: No   Neuro/Psych: Uneventful            Sign Out: Acceptable/Baseline neuro status   Airway/Respiratory: Uneventful            Sign Out: Acceptable/Baseline resp. status   CV/Hemodynamics: Uneventful            Sign Out: Acceptable CV status; No obvious hypovolemia; No obvious fluid overload   Other NRE: NONE   DID A NON-ROUTINE EVENT OCCUR?            Last vitals:  Vitals Value Taken Time   /70 11/01/24 1015   Temp 97  F (36.1  C) 11/01/24 1015   Pulse     Resp 16 11/01/24 1015   SpO2 100 % 11/01/24 1015       Electronically Signed By: Anatoliy Estrella MD  November 1, 2024  10:48 AM

## 2024-11-01 NOTE — OR NURSING
Patient was offered urine pregnancy testing d/t having general anesthesia today. Patient has declined the pregnancy test and states she is currently on her period at this time.

## 2024-11-06 LAB
PATH REPORT.COMMENTS IMP SPEC: NORMAL
PATH REPORT.COMMENTS IMP SPEC: NORMAL
PATH REPORT.FINAL DX SPEC: NORMAL
PATH REPORT.GROSS SPEC: NORMAL
PATH REPORT.MICROSCOPIC SPEC OTHER STN: NORMAL
PATH REPORT.RELEVANT HX SPEC: NORMAL
PHOTO IMAGE: NORMAL

## 2024-11-07 ENCOUNTER — BEH TREATMENT PLAN (OUTPATIENT)
Dept: PSYCHIATRY | Facility: CLINIC | Age: 24
End: 2024-11-07
Payer: COMMERCIAL

## 2024-11-07 ENCOUNTER — E-VISIT (OUTPATIENT)
Dept: URGENT CARE | Facility: CLINIC | Age: 24
End: 2024-11-07
Payer: COMMERCIAL

## 2024-11-07 ENCOUNTER — OFFICE VISIT (OUTPATIENT)
Dept: PSYCHIATRY | Facility: CLINIC | Age: 24
End: 2024-11-07
Payer: COMMERCIAL

## 2024-11-07 DIAGNOSIS — F43.10 POSTTRAUMATIC STRESS DISORDER: ICD-10-CM

## 2024-11-07 DIAGNOSIS — F33.1 MAJOR DEPRESSIVE DISORDER, RECURRENT, MODERATE (H): Primary | ICD-10-CM

## 2024-11-07 DIAGNOSIS — R30.0 DYSURIA: Primary | ICD-10-CM

## 2024-11-07 PROCEDURE — 90837 PSYTX W PT 60 MINUTES: CPT | Performed by: PSYCHOLOGIST

## 2024-11-07 ASSESSMENT — ANXIETY QUESTIONNAIRES
7. FEELING AFRAID AS IF SOMETHING AWFUL MIGHT HAPPEN: SEVERAL DAYS
6. BECOMING EASILY ANNOYED OR IRRITABLE: SEVERAL DAYS
3. WORRYING TOO MUCH ABOUT DIFFERENT THINGS: MORE THAN HALF THE DAYS
2. NOT BEING ABLE TO STOP OR CONTROL WORRYING: SEVERAL DAYS
GAD7 TOTAL SCORE: 10
5. BEING SO RESTLESS THAT IT IS HARD TO SIT STILL: SEVERAL DAYS
4. TROUBLE RELAXING: MORE THAN HALF THE DAYS
1. FEELING NERVOUS, ANXIOUS, OR ON EDGE: MORE THAN HALF THE DAYS
IF YOU CHECKED OFF ANY PROBLEMS ON THIS QUESTIONNAIRE, HOW DIFFICULT HAVE THESE PROBLEMS MADE IT FOR YOU TO DO YOUR WORK, TAKE CARE OF THINGS AT HOME, OR GET ALONG WITH OTHER PEOPLE: SOMEWHAT DIFFICULT
GAD7 TOTAL SCORE: 10

## 2024-11-07 ASSESSMENT — PATIENT HEALTH QUESTIONNAIRE - PHQ9: SUM OF ALL RESPONSES TO PHQ QUESTIONS 1-9: 9

## 2024-11-07 NOTE — PROGRESS NOTES
OUTPATIENT TREATMENT PLAN SUMMARY    Client Name: Cassidy Fernandes   YOB: 2000 (23 year old)   Date of Treatment Plan: 11/7/24 (90 day review date: 2/7/25)   Date of initial service: 6/25/24                               1. DSM-V Diagnoses:   F33.1 Major Depressive Disorder, recurrent episode, moderate severity  F43.10 Post-Traumatic Stress Disorder     Other: interpersonal/relationship stressors, postpartum adjustment     2. Current symptoms and circumstances that substantiate the diagnosis:   Depressed mood and/or irritability most days, episodes of tearfulness, negative cognitions (hopelessness, low self-esteem, feelings of shame/guilt), anhedonia, changes in appetite (lower) and energy (lower).    Trauma symptoms: intrusive memories, avoidance, negative view of self/others/world, hyperarousal symptoms.    3. How symptoms and/or behaviors are affecting level of function:   Symptoms above have increased significantly since Cassidy experienced a challenging birth post-partum period. Several interpersonal stressors have exacerbated mood symptoms, including: high mobility, financial challenges, death of father, relationship conflict with partner. Symptoms interfere with Cassidy's daily functioning, impact her personal relationships, and ability to be as present and joyful in her role as a new mother as she would like.    4. Risk Assessment:  Suicide:  Assessed Level of Immediate Risk: None  Ideation: No  Plan:  No  Means: No  Intent: No    Homicide/Violence:  Assessed Level of Immediate Risk: None  Ideation: No  Plan: No  Means: No  Intent: No    If on a medication, please include name and dosage:  None      Symptom/Problem Measurable Goals Interventions Gains Made   1. Depression/anger 1.  Cassidy will score in average to mild range on the [place] Depression Inventory (BDI)  or PHQ9    6/25/24 BDI Total = 35 severe range    10/21/24 PHQ9 = 1 normal  11/7/24 PHQ9 = 9 mild    11/7/24 GAD7 = 10 mderate  "1.CBT, supportive psychotherapy 1. Moderate to significant improvement - Cassidy reports that anger and depression have improved significantly. She is taking Sertraline (25mg). She no longer experiences \"rages\" and irritability has dissipated.   2.Limited coping strategies, limited pleasurable activities 2. Cassidy will increase use of health coping strategies by 80%.     Once a week she will connect with someone socially for pleasure, or will engage in a hobby/activity for michael/pleasure. 2. CBT, supportive psychotherapy 2. Moderate - Cassidy has connected with a small business group and is making social connections through that.    3. Interpersonal challenges 3.  Cassidy will increase interpersonal effectiveness (e.g., improved boundary setting, increased self-advocacy, improved conflict resolution/communication skills), per self-reported reduction in interpersonal conflicts by 50%  3. Communication and interpersonal skill building  3. Significant improvement - Cassidy is communicating needs and boundaries more clearly and effectively with others. She and her partner and baby moved into their own place, which has helped.     5. Frequency of Sessions: Therapy is biweekly    6. Discharge and Aftercare Goals: To be determined.     7. Expected duration of treatment:  12-18 months    8. Participants in therapy plan (family, friends, support network): Dr. Carlos Benjamin      Treatment Plan and goals were reviewed on 11/7/24 and verbal consent was obtained. Please see encounter note for Acknowledgement of Current Treatment Plan          Regulatory Guidelines for Updating Treatment Plan  [place] Medical Assistance: Reviewed & signed at least every 90days  Medicare:  Update per policy    Anna Marie Gonzalez, PhD,       Licensed Psychologist      "

## 2024-11-07 NOTE — PATIENT INSTRUCTIONS
Dear Cassidy Fernandes,     After reviewing your responses, I would like you to come in for a urine test to make sure we treat you correctly. This urine test is to evaluate you for a possible urinary tract infection, and should be scheduled for today or tomorrow. Schedule a Lab Only appointment here.     Lab appointments are not available at most locations on the weekends. If no Lab Only appointment is available, you should be seen in any of our convenient Walk-in or Urgent Care Centers, which can be found on our website here.     You will receive instructions with your results in [employer] once they are available.     If your symptoms worsen, you develop pain in your back or stomach, develop fevers, or are not improving in 5 days, please contact your primary care provider for an appointment or visit a Walk-in or Urgent Care Center to be seen.     Thanks again for choosing us as your health care partner,     Tyrone Booth PA-C

## 2024-11-07 NOTE — PROGRESS NOTES
OUTPATIENT PSYCHOTHERAPY PROGRESS NOTE    Client Name: Cassidy Fernandes   YOB: 2000 (24 year old)   Date of Service:  11/07/24  Time of Service: 3:00pm to 4:00pm (60 minutes)  Service Type(s): 90304 psychotherapy (53-60 min. w/ patient and/or family)  Type of service: In-person at [facility] clinic     Diagnoses:   F33.1 Major Depressive Disorder, recurrent episode, moderate severity  F43.10 Post-Traumatic Stress Disorder     Other: interpersonal/relationship stressors, postpartum adjustment     Individuals Present:   Cassidy    Treatment goal(s) being addressed:   Decrease symptoms depression/anger  Increase self-care and coping skills (michael, fulfillment, hobbies, social support)  Improve interpersonal effectiveness (boundaries, communication/conflict resolution, self-advocacy)    Subjective:  Cassidy provided updates from the past two weeks. She had surgery on her wrist so has not been able to work, but it is healing. She and Douglas and their baby moved into their own apartment. She is feeling good about having the chance to live on their own and be a family without the distraction of others in the home. Eddi (baby) is adjusting to the change. Cassidy is helping her mom sell the family home and is trying to convince her to move into their same apartment complex. Cassidy's youngest sister is struggling with mental health and behavior; Cassidy is concerned, but also does not feel responsible for saving her.    Treatment:   Treatment modality is cognitive behavioral therapy and supportive psychotherapy. Session focused on processing thoughts and feelings around recent successes and challenges. Therapeutic strategies used include: reflective listening, validation, and communication skills.     Reviewed treatment plan. No changes to treatment plan or goals. Cassidy provided verbal consent.    Assessment and Progress:   Behavioral Observations: Cassidy presented with neutral affect.  aCssidy was open and reflective  throughout the session. She was receptive to therapeutic support provided today.    Assessment: Cassidy is doing well overall. Mood is stabilizing. She is productive and socially engaged. There are no safety concerns (Cassidy denies ideation, plan, or intent to harm self or others). Cassidy is making good progress towards treatment goals.     Plan:   Next therapy appointment has been scheduled for 11/20/24 to continue work on treatment goals.      Anna Marie Gonzalez, PhD, LP  Clinical Psychologist      Treatment Plan review due: 2/7/25

## 2024-11-08 ENCOUNTER — OFFICE VISIT (OUTPATIENT)
Dept: URGENT CARE | Facility: URGENT CARE | Age: 24
End: 2024-11-08
Payer: COMMERCIAL

## 2024-11-08 VITALS
HEART RATE: 108 BPM | BODY MASS INDEX: 31.06 KG/M2 | WEIGHT: 191 LBS | SYSTOLIC BLOOD PRESSURE: 115 MMHG | OXYGEN SATURATION: 97 % | DIASTOLIC BLOOD PRESSURE: 73 MMHG | RESPIRATION RATE: 16 BRPM | TEMPERATURE: 97.1 F

## 2024-11-08 DIAGNOSIS — N30.01 ACUTE CYSTITIS WITH HEMATURIA: Primary | ICD-10-CM

## 2024-11-08 DIAGNOSIS — R30.0 DYSURIA: ICD-10-CM

## 2024-11-08 LAB
BACTERIA #/AREA URNS HPF: ABNORMAL /HPF
MUCOUS THREADS #/AREA URNS LPF: PRESENT /LPF
RBC #/AREA URNS AUTO: ABNORMAL /HPF
SQUAMOUS #/AREA URNS AUTO: ABNORMAL /LPF
WBC #/AREA URNS AUTO: >100 /HPF
WBC CLUMPS #/AREA URNS HPF: PRESENT /HPF

## 2024-11-08 PROCEDURE — 87086 URINE CULTURE/COLONY COUNT: CPT | Performed by: NURSE PRACTITIONER

## 2024-11-08 PROCEDURE — 99213 OFFICE O/P EST LOW 20 MIN: CPT | Performed by: NURSE PRACTITIONER

## 2024-11-08 PROCEDURE — 87186 SC STD MICRODIL/AGAR DIL: CPT | Performed by: NURSE PRACTITIONER

## 2024-11-08 PROCEDURE — 81015 MICROSCOPIC EXAM OF URINE: CPT | Performed by: NURSE PRACTITIONER

## 2024-11-08 RX ORDER — NITROFURANTOIN 25; 75 MG/1; MG/1
100 CAPSULE ORAL 2 TIMES DAILY
Qty: 10 CAPSULE | Refills: 0 | Status: SHIPPED | OUTPATIENT
Start: 2024-11-08 | End: 2024-11-13

## 2024-11-08 ASSESSMENT — PAIN SCALES - GENERAL: PAINLEVEL_OUTOF10: SEVERE PAIN (7)

## 2024-11-08 NOTE — PROGRESS NOTES
Assessment & Plan     Acute cystitis with hematuria    - nitroFURantoin macrocrystal-monohydrate (MACROBID) 100 MG capsule  Dispense: 10 capsule; Refill: 0    Dysuria    - UA Microscopic with Reflex to Culture  - Urine Culture     Reviewed UA during visit showing likely UTI. Prescription sent to pharmacy for Macrobid twice daily for 5 days. Urine culture in process, will notify if patient should stop or change antibiotic. Increase fluid intake, decrease caffeine which is a bladder irritant    Follow-up with PCP if symptoms persist for 5 days, and sooner if symptoms worsen or new symptoms develop.     Discussed red flag symptoms which warrant immediate visit in emergency room    All questions were answered and patient verbalized understanding. AVS sent via [employer]    Celia Fabian, FABIÁN, APRN, CNP 11/8/2024 2:58 PM  [facility]    Lazaro Benjamin is a 24 year old female who presents to clinic today for the following health issues:  Chief Complaint   Patient presents with    Urgent Care    Frequency     X's 2 days, burning with pain with urination          11/8/2024     2:22 PM   Additional Questions   Roomed by ca   Accompanied by self       UTI    Onset of symptoms was 2day(s).  Course of illness is same  Current and associated symptoms dysuria, urinary frequency, urinary urgency, suprapubic mild abdominal pain  Denies fever, emesis, flank pain, vaginal discharge, itching, odor.   Unsure about hematuria as urine is orange from azo  Treatment and measures tried azo hasn't helped  Predisposing factors include none  Denies history of frequent UTI's, history of Pyelonephritis, diabetes, and kidney stones  She submitted an Evisit for symptoms yesterday and wet prep was ordered but not completed.     She is sexually active and denies chance of pregnancy. No concern for STD.     Problem list, Medication list, Allergies, and Medical history reviewed in EPIC.    ROS:  Review of systems  negative except for noted above          Objective    /73   Pulse 108   Temp 97.1  F (36.2  C) (Tympanic)   Resp 16   Wt 86.6 kg (191 lb)   LMP 10/29/2024 (Exact Date)   SpO2 97%   BMI 31.06 kg/m    Physical Exam  Constitutional:       General: She is not in acute distress.     Appearance: She is not toxic-appearing or diaphoretic.   Abdominal:      General: Bowel sounds are normal. There is no distension.      Palpations: Abdomen is soft.      Tenderness: There is no abdominal tenderness. There is no right CVA tenderness, left CVA tenderness, guarding or rebound.   Lymphadenopathy:      Cervical: No cervical adenopathy.   Skin:     General: Skin is warm and dry.   Neurological:      Mental Status: She is alert.              Labs:  Results for orders placed or performed in visit on 11/08/24   UA Microscopic with Reflex to Culture     Status: Abnormal   Result Value Ref Range    Bacteria Urine Many (A) None Seen /HPF    RBC Urine 25-50 (A) 0-2 /HPF /HPF    WBC Urine >100 (A) 0-5 /HPF /HPF    Squamous Epithelials Urine Many (A) None Seen /LPF    WBC Clumps Urine Present (A) None Seen /HPF    Mucus Urine Present (A) None Seen /LPF

## 2024-11-10 LAB — BACTERIA UR CULT: ABNORMAL

## 2024-11-11 ENCOUNTER — OFFICE VISIT (OUTPATIENT)
Dept: ORTHOPEDICS | Facility: CLINIC | Age: 24
End: 2024-11-11
Payer: COMMERCIAL

## 2024-11-11 VITALS
BODY MASS INDEX: 30.53 KG/M2 | HEART RATE: 99 BPM | RESPIRATION RATE: 18 BRPM | DIASTOLIC BLOOD PRESSURE: 69 MMHG | WEIGHT: 190 LBS | HEIGHT: 66 IN | SYSTOLIC BLOOD PRESSURE: 106 MMHG

## 2024-11-11 DIAGNOSIS — M67.432 GANGLION CYST OF DORSUM OF LEFT WRIST: Primary | ICD-10-CM

## 2024-11-11 PROCEDURE — 99024 POSTOP FOLLOW-UP VISIT: CPT | Performed by: ORTHOPAEDIC SURGERY

## 2024-11-11 NOTE — LETTER
11/11/2024      Cassidy Fernandes  [address] [address]      Dear Colleague,    Thank you for referring your patient, Cassidy Fernandes, to the [facility]. Please see a copy of my visit note below.    Follow up excision left dorsal ganglion cyst on 11/1/24.    Patient presents for suture removal.   The wound is well healed without signs of infection.    The sutures are removed.   Scar massage with vitamin E cream.  Return as needed.    Again, thank you for allowing me to participate in the care of your patient.        Sincerely,        Collin Bright MD

## 2024-11-11 NOTE — PROGRESS NOTES
Follow up excision left dorsal ganglion cyst on 11/1/24.    Patient presents for suture removal.   The wound is well healed without signs of infection.    The sutures are removed.   Scar massage with vitamin E cream.  Return as needed.

## 2024-11-19 NOTE — PROGRESS NOTES
OUTPATIENT PSYCHOTHERAPY PROGRESS NOTE    Client Name: Cassidy Fernandes   YOB: 2000 (24 year old)   Date of Service:  11/20/24  Time of Service: 5:03pm to 6:00pm (57 minutes)  Service Type(s): 91505 psychotherapy (53-60 min. w/ patient and/or family)  Type of service: In-person at [facility] clinic     Diagnoses:   F33.1 Major Depressive Disorder, recurrent episode, moderate severity  F43.10 Post-Traumatic Stress Disorder     Other: interpersonal/relationship stressors, postpartum adjustment     Individuals Present:   Cassidy, her baby (Eddi)    Treatment goal(s) being addressed:   Decrease symptoms depression/anger  Increase self-care and coping skills (michael, fulfillment, hobbies, social support)  Improve interpersonal effectiveness (boundaries, communication/conflict resolution, self-advocacy)    Subjective:  Cassidy provided updates from the past two weeks. She and Douglas and their baby are adjusting to living in their own apartment. She is enjoying the opportunity and experience to be a family without the distraction of others in the home. There are some chalelnges with parenting differences. Cassidy is more patient and regulated, while Douglas feels it is his role as the father to use yelling as a parenting tactic to help the baby learn. Cassidy disagrees with this, but has not been able to convince Douglas why yelling is less effective.   Cassidy's mom is preparing to sell her home and is leaning towards moving into the same apartment complex. Work is going well.    Treatment:   Treatment modality is cognitive behavioral therapy and supportive psychotherapy. Cassidy brought Eddi to session today. He played with toys throughout session. Session focused on processing thoughts and feelings around recent successes and challenges. Therapeutic strategies used include: reflective listening, validation, and positive parenting skill review.     Assessment and Progress:   Behavioral Observations: Cassidy presented with  euthymic affect.  Cassidy was observed to be very loving and attentive with Eddi. She was responsive to him, redirected him as needed, and supported his exploration of toys. Cassidy was open and reflective throughout the session. She was receptive to therapeutic support provided today.    Assessment: Cassidy is doing well overall. Mood is stabilizing. She is productive and socially engaged. There are no safety concerns (Cassidy denies ideation, plan, or intent to harm self or others). Cassidy is making good progress towards treatment goals.     Plan:   Next therapy appointment has been scheduled for 12/04/24 to continue work on treatment goals.      Anna Marie Gonzalez, PhD,   Clinical Psychologist      Treatment Plan review due: 2/7/25

## 2024-11-20 ENCOUNTER — OFFICE VISIT (OUTPATIENT)
Dept: PSYCHIATRY | Facility: CLINIC | Age: 24
End: 2024-11-20
Payer: COMMERCIAL

## 2024-11-20 DIAGNOSIS — F33.1 MAJOR DEPRESSIVE DISORDER, RECURRENT, MODERATE (H): Primary | ICD-10-CM

## 2024-11-20 DIAGNOSIS — F43.10 POSTTRAUMATIC STRESS DISORDER: ICD-10-CM

## 2024-12-02 ENCOUNTER — OFFICE VISIT (OUTPATIENT)
Dept: ORTHOPEDICS | Facility: CLINIC | Age: 24
End: 2024-12-02
Payer: COMMERCIAL

## 2024-12-02 VITALS
WEIGHT: 190 LBS | DIASTOLIC BLOOD PRESSURE: 81 MMHG | RESPIRATION RATE: 18 BRPM | HEART RATE: 83 BPM | SYSTOLIC BLOOD PRESSURE: 117 MMHG | HEIGHT: 66 IN | BODY MASS INDEX: 30.53 KG/M2

## 2024-12-02 DIAGNOSIS — M67.432 GANGLION CYST OF DORSUM OF LEFT WRIST: Primary | ICD-10-CM

## 2024-12-02 PROCEDURE — 99024 POSTOP FOLLOW-UP VISIT: CPT | Performed by: ORTHOPAEDIC SURGERY

## 2024-12-02 NOTE — PROGRESS NOTES
Follow up excision left dorsal ganglion cyst on 11/1/24.    This was a collection of small cyst.  She now has thickness of the wound and return of a lump just distal to the scar.  She has significant scar adherence of the wound.  At the wound itself, I cannot feel a recurrent cyst.  The tissue here appears like firm scar.  The lump more distal appears to be attached to a tendon and may be a synovial lump on the tendon.    Plan:  continue scar massage aggressive ly.  Return to clinic 2 months for a clinical check.

## 2024-12-02 NOTE — LETTER
12/2/2024      Cassidy Fernandes  [address] [place] [address]      Dear Colleague,    Thank you for referring your patient, Cassidy Fernandes, to the [facility]. Please see a copy of my visit note below.    Follow up excision left dorsal ganglion cyst on 11/1/24.    This was a collection of small cyst.  She now has thickness of the wound and return of a lump just distal to the scar.  She has significant scar adherence of the wound.  At the wound itself, I cannot feel a recurrent cyst.  The tissue here appears like firm scar.  The lump more distal appears to be attached to a tendon and may be a synovial lump on the tendon.    Plan:  continue scar massage aggressive ly.  Return to clinic 2 months for a clinical check.    Again, thank you for allowing me to participate in the care of your patient.        Sincerely,        Collin Bright MD

## 2025-01-07 ENCOUNTER — OFFICE VISIT (OUTPATIENT)
Dept: FAMILY MEDICINE | Facility: CLINIC | Age: 25
End: 2025-01-07
Payer: COMMERCIAL

## 2025-01-07 VITALS
WEIGHT: 196.8 LBS | HEART RATE: 78 BPM | TEMPERATURE: 98.6 F | RESPIRATION RATE: 18 BRPM | DIASTOLIC BLOOD PRESSURE: 63 MMHG | BODY MASS INDEX: 32.79 KG/M2 | OXYGEN SATURATION: 100 % | HEIGHT: 65 IN | SYSTOLIC BLOOD PRESSURE: 132 MMHG

## 2025-01-07 DIAGNOSIS — Z31.9 PATIENT DESIRES PREGNANCY: ICD-10-CM

## 2025-01-07 DIAGNOSIS — N76.0 BV (BACTERIAL VAGINOSIS): ICD-10-CM

## 2025-01-07 DIAGNOSIS — Z13.220 LIPID SCREENING: ICD-10-CM

## 2025-01-07 DIAGNOSIS — Z12.4 SCREENING FOR CERVICAL CANCER: ICD-10-CM

## 2025-01-07 DIAGNOSIS — N92.6 IRREGULAR MENSTRUAL CYCLE: ICD-10-CM

## 2025-01-07 DIAGNOSIS — B96.89 BV (BACTERIAL VAGINOSIS): ICD-10-CM

## 2025-01-07 DIAGNOSIS — Z00.00 ROUTINE GENERAL MEDICAL EXAMINATION AT A HEALTH CARE FACILITY: Primary | ICD-10-CM

## 2025-01-07 DIAGNOSIS — N89.8 VAGINAL DISCHARGE: ICD-10-CM

## 2025-01-07 LAB
CLUE CELLS: PRESENT
HCG SERPL QL: NEGATIVE
TRICHOMONAS, WET PREP: ABNORMAL
WBC'S/HIGH POWER FIELD, WET PREP: ABNORMAL
YEAST, WET PREP: ABNORMAL

## 2025-01-07 PROCEDURE — 36415 COLL VENOUS BLD VENIPUNCTURE: CPT | Performed by: FAMILY MEDICINE

## 2025-01-07 PROCEDURE — 87210 SMEAR WET MOUNT SALINE/INK: CPT | Performed by: FAMILY MEDICINE

## 2025-01-07 PROCEDURE — 84703 CHORIONIC GONADOTROPIN ASSAY: CPT | Performed by: FAMILY MEDICINE

## 2025-01-07 RX ORDER — PRENATAL VIT/IRON FUM/FOLIC AC 27MG-0.8MG
1 TABLET ORAL DAILY
Qty: 90 TABLET | Refills: 3 | Status: SHIPPED | OUTPATIENT
Start: 2025-01-07

## 2025-01-07 SDOH — HEALTH STABILITY: PHYSICAL HEALTH: ON AVERAGE, HOW MANY MINUTES DO YOU ENGAGE IN EXERCISE AT THIS LEVEL?: 20 MIN

## 2025-01-07 SDOH — HEALTH STABILITY: PHYSICAL HEALTH: ON AVERAGE, HOW MANY DAYS PER WEEK DO YOU ENGAGE IN MODERATE TO STRENUOUS EXERCISE (LIKE A BRISK WALK)?: 7 DAYS

## 2025-01-07 ASSESSMENT — PATIENT HEALTH QUESTIONNAIRE - PHQ9
SUM OF ALL RESPONSES TO PHQ QUESTIONS 1-9: 8
SUM OF ALL RESPONSES TO PHQ QUESTIONS 1-9: 8
10. IF YOU CHECKED OFF ANY PROBLEMS, HOW DIFFICULT HAVE THESE PROBLEMS MADE IT FOR YOU TO DO YOUR WORK, TAKE CARE OF THINGS AT HOME, OR GET ALONG WITH OTHER PEOPLE: SOMEWHAT DIFFICULT

## 2025-01-07 ASSESSMENT — SOCIAL DETERMINANTS OF HEALTH (SDOH): HOW OFTEN DO YOU GET TOGETHER WITH FRIENDS OR RELATIVES?: THREE TIMES A WEEK

## 2025-01-07 NOTE — PROGRESS NOTES
"Preventive Care Visit  [facility]  Love LeeDO, Family Medicine  Jan 7, 2025      Assessment & Plan     Routine general medical examination at a health care facility  BMI  Estimated body mass index is 32.4 kg/m  as calculated from the following:    Height as of this encounter: 1.66 m (5' 5.35\").    Weight as of this encounter: 89.3 kg (196 lb 12.8 oz).   Weight management plan: Discussed healthy diet and exercise guidelines    Counseling  Appropriate preventive services were addressed with this patient via screening, questionnaire, or discussion as appropriate for fall prevention, nutrition, physical activity,  social engagement, weight loss and cognition.  Checklist reviewing preventive services available has been given to the patient.  Reviewed patient's diet, addressing concerns and/or questions.   The patient was instructed to see the dentist every 6 months.   The patient's PHQ-9 score is consistent with mild depression. She was provided with information regarding depression.     Patient desires pregnancy  Irregular menstrual cycle  Vaginal discharge  Delay trying to conceive the past 3 months.  Had abnormally short period in December.  Unclear UPT at home so will assess with serum hCG.  Recommend starting prenatal vitamin.  Wet prep also collected due to recent irregular cycle and discharge noted in vaginal vault during Pap smear screen.  - HCG qualitative, Blood ([id number])  - Prenatal Vit-Fe Fumarate-FA (PRENATAL MULTIVITAMIN W/IRON) 27-0.8 MG tablet  Dispense: 90 tablet; Refill: 3  - Wet prep - Clinic Collect    Screening for cervical cancer  - Pap Screen Only - Recommended Age 21 - 24 Years    Lipid screening  - Lipid Profile (Chol, Trig, HDL, LDL calc)      Patient has been advised of split billing requirements and indicates understanding: Yes    The longitudinal plan of care for the diagnosis(es)/condition(s) as documented were addressed during this visit. Due to the added " complexity in care, I will continue to support Cassidy in the subsequent management and with ongoing continuity of care.      Subjective   Cassidy is a 24 year old, presenting for the following:  Abnormal Bleeding Problem and Physical (2 day cycle in December, heavy for 2 days then stopped,  no pos pregnancy test)        1/7/2025     2:54 PM   Additional Questions   Roomed by Karla DELCID CMA          History of Present Illness       Reason for visit:  Just talk about overal health in regards to hormons and having another kid    She eats 2-3 servings of fruits and vegetables daily.She consumes 2 sweetened beverage(s) daily.She exercises with enough effort to increase her heart rate 20 to 29 minutes per day.  She exercises with enough effort to increase her heart rate 3 or less days per week. She is missing 7 dose(s) of medications per week.  She is not taking prescribed medications regularly due to remembering to take.    Last 3 months trying to conceive.  Not currently on prenatal vitamin.  Has not used Depo since August.  Has a 2-year-old son Eddi.  Has been using the flow ovulation tracker dior.  Abnormal.  December 2024, short and heavy.    Health Care Directive  Patient does not have a Health Care Directive: Discussed advance care planning with patient; information given to patient to review.      1/7/2025   General Health   How would you rate your overall physical health? (!) FAIR   Feel stress (tense, anxious, or unable to sleep) Only a little   (!) STRESS CONCERN      1/7/2025   Nutrition   Three or more servings of calcium each day? Yes   Diet: Regular (no restrictions)   How many servings of fruit and vegetables per day? 4 or more   How many sweetened beverages each day? (!) 2         1/7/2025   Exercise   Days per week of moderate/strenous exercise 7 days   Average minutes spent exercising at this level 20 min         1/7/2025   Social Factors   Frequency of gathering with friends or relatives Three times a week  "  Worry food won't last until get money to buy more No   Food not last or not have enough money for food? No   Do you have housing? (Housing is defined as stable permanent housing and does not include staying ouside in a car, in a tent, in an abandoned building, in an overnight shelter, or couch-surfing.) Yes   Are you worried about losing your housing? No   Lack of transportation? No   Unable to get utilities (heat,electricity)? No         1/7/2025   Dental   Dentist two times every year? (!) NO         1/7/2025   TB Screening   Were you born outside of the [place]? No       Today's PHQ-9 Score:       1/7/2025     2:39 PM   PHQ-9 SCORE   PHQ-9 Total Score MyChart 8 (Mild depression)   PHQ-9 Total Score 8        Patient-reported         1/7/2025   Substance Use   Alcohol more than 3/day or more than 7/wk No   Do you use any other substances recreationally? No     Social History     Tobacco Use    Smoking status: Never     Passive exposure: Yes    Smokeless tobacco: Never   Vaping Use    Vaping status: Never Used   Substance Use Topics    Alcohol use: Not Currently    Drug use: Not Currently     Types: Marijuana     Comment: x1 in high school           1/7/2025   STI Screening   New sexual partner(s) since last STI/HIV test? No     History of abnormal Pap smear: No - age 21-29 PAP every 3 years recommended        3/8/2022    10:45 AM   PAP / HPV   PAP Negative for Intraepithelial Lesion or Malignancy (NILM)            1/7/2025   Contraception/Family Planning   Questions about contraception or family planning (!) YES         Reviewed and updated as needed this visit by Provider   Tobacco  Allergies  Meds  Problems  Med Hx  Surg Hx  Fam Hx               Objective    Exam  /63 (BP Location: Left arm, Patient Position: Sitting, Cuff Size: Adult Regular)   Pulse 78   Temp 98.6  F (37  C) (Oral)   Resp 18   Ht 1.66 m (5' 5.35\")   Wt 89.3 kg (196 lb 12.8 oz)   LMP 12/20/2024 (Exact Date)   SpO2 100%   BMI " "32.40 kg/m     Estimated body mass index is 32.4 kg/m  as calculated from the following:    Height as of this encounter: 1.66 m (5' 5.35\").    Weight as of this encounter: 89.3 kg (196 lb 12.8 oz).    Physical Exam  GENERAL: alert and no distress  EYES: Eyes grossly normal to inspection, PERRL and conjunctivae and sclerae normal  HENT: ear canals and TM's normal, nose and mouth without ulcers or lesions  NECK: no adenopathy, no asymmetry, masses, or scars  RESP: lungs clear to auscultation - no rales, rhonchi or wheezes  CV: regular rate and rhythm, normal S1 S2, no S3 or S4, no murmur, click or rub, no peripheral edema  ABDOMEN: soft, nontender   (female) w/bimanual: normal female external genitalia, normal urethral meatus, normal vaginal mucosa, and normal cervix/adnexa/uterus without masses, white discharge in vaginal vault  MS: no gross musculoskeletal defects noted, no edema  SKIN: no suspicious lesions or rashes  NEURO: Normal strength and tone, mentation intact and speech normal  PSYCH: mentation appears normal, affect normal/bright        Signed Electronically by: Love Lee DO    "

## 2025-01-07 NOTE — PATIENT INSTRUCTIONS
Patient Education   Preventive Care Advice   This is general advice given by our system to help you stay healthy. However, your care team may have specific advice just for you. Please talk to your care team about your preventive care needs.  Nutrition  Eat 5 or more servings of fruits and vegetables each day.  Try wheat bread, brown rice and whole grain pasta (instead of white bread, rice, and pasta).  Get enough calcium and vitamin D. Check the label on foods and aim for 100% of the RDA (recommended daily allowance).  Lifestyle  Exercise at least 150 minutes each week  (30 minutes a day, 5 days a week).  Do muscle strengthening activities 2 days a week. These help control your weight and prevent disease.  No smoking.  Wear sunscreen to prevent skin cancer.  Have a dental exam and cleaning every 6 months.  Yearly exams  See your health care team every year to talk about:  Any changes in your health.  Any medicines your care team has prescribed.  Preventive care, family planning, and ways to prevent chronic diseases.  Shots (vaccines)   HPV shots (up to age 26), if you've never had them before.  Hepatitis B shots (up to age 59), if you've never had them before.  COVID-19 shot: Get this shot when it's due.  Flu shot: Get a flu shot every year.  Tetanus shot: Get a tetanus shot every 10 years.  Pneumococcal, hepatitis A, and RSV shots: Ask your care team if you need these based on your risk.  Shingles shot (for age 50 and up)  General health tests  Diabetes screening:  Starting at age 35, Get screened for diabetes at least every 3 years.  If you are younger than age 35, ask your care team if you should be screened for diabetes.  Cholesterol test: At age 39, start having a cholesterol test every 5 years, or more often if advised.  Bone density scan (DEXA): At age 50, ask your care team if you should have this scan for osteoporosis (brittle bones).  Hepatitis C: Get tested at least once in your life.  STIs (sexually  transmitted infections)  Before age 24: Ask your care team if you should be screened for STIs.  After age 24: Get screened for STIs if you're at risk. You are at risk for STIs (including HIV) if:  You are sexually active with more than one person.  You don't use condoms every time.  You or a partner was diagnosed with a sexually transmitted infection.  If you are at risk for HIV, ask about PrEP medicine to prevent HIV.  Get tested for HIV at least once in your life, whether you are at risk for HIV or not.  Cancer screening tests  Cervical cancer screening: If you have a cervix, begin getting regular cervical cancer screening tests starting at age 21.  Breast cancer scan (mammogram): If you've ever had breasts, begin having regular mammograms starting at age 40. This is a scan to check for breast cancer.  Colon cancer screening: It is important to start screening for colon cancer at age 45.  Have a colonoscopy test every 10 years (or more often if you're at risk) Or, ask your provider about stool tests like a FIT test every year or Cologuard test every 3 years.  To learn more about your testing options, visit:   .  For help making a decision, visit:   https://bit.ly/dq10945.  Prostate cancer screening test: If you have a prostate, ask your care team if a prostate cancer screening test (PSA) at age 55 is right for you.  Lung cancer screening: If you are a current or former smoker ages 50 to 80, ask your care team if ongoing lung cancer screenings are right for you.  For informational purposes only. Not to replace the advice of your health care provider. Copyright   2023 [facility] [employer] [facility]. All rights reserved. Clinically reviewed by the [facility]. [employer] [id number] - REV 01/24.  Learning About Depression Screening  What is depression screening?  Depression screening is a way to see if you have depression symptoms. It may be done by a doctor or counselor. It's often part of a routine  "checkup. That's because your mental health is just as important as your physical health.  Depression is a mental health condition that affects how you feel, think, and act. You may:  Have less energy.  Lose interest in your daily activities.  Feel sad and grouchy for a long time.  Depression is very common. It affects people of all ages.  Many things can lead to depression. Some people become depressed after they have a stroke or find out they have a major illness like cancer or heart disease. The death of a loved one or a breakup may lead to depression. It can run in families. Most experts believe that a combination of inherited genes and stressful life events can cause it.  What happens during screening?  You may be asked to fill out a form about your depression symptoms. You and the doctor will discuss your answers. The doctor may ask you more questions to learn more about how you think, act, and feel.  What happens after screening?  If you have symptoms of depression, your doctor will talk to you about your options.  Doctors usually treat depression with medicines or counseling. Often, combining the two works best. Many people don't get help because they think that they'll get over the depression on their own. But people with depression may not get better unless they get treatment.  The cause of depression is not well understood. There may be many factors involved. But if you have depression, it's not your fault.  A serious symptom of depression is thinking about death or suicide. If you or someone you care about talks about this or about feeling hopeless, get help right away.  It's important to know that depression can be treated. Medicine, counseling, and self-care may help.  Where can you learn more?  Go to https://www.[address].net/patiented  Enter T185 in the search box to learn more about \"Learning About Depression Screening.\"  Current as of: July 31, 2024  Content Version: 14.3    2024 [employer].   Care instructions adapted under license by your healthcare professional. If you have questions about a medical condition or this instruction, always ask your healthcare professional. [employer] disclaims any warranty or liability for your use of this information.

## 2025-01-08 LAB
CHOLEST SERPL-MCNC: 172 MG/DL
FASTING STATUS PATIENT QL REPORTED: NO
HDLC SERPL-MCNC: 54 MG/DL
LDLC SERPL CALC-MCNC: 81 MG/DL
NONHDLC SERPL-MCNC: 118 MG/DL
TRIGL SERPL-MCNC: 185 MG/DL

## 2025-01-08 RX ORDER — METRONIDAZOLE 500 MG/1
500 TABLET ORAL 2 TIMES DAILY
Qty: 14 TABLET | Refills: 0 | Status: SHIPPED | OUTPATIENT
Start: 2025-01-08 | End: 2025-01-15

## 2025-01-09 LAB
BKR LAB AP GYN ADEQUACY: NORMAL
BKR LAB AP GYN INTERPRETATION: NORMAL
BKR LAB AP HPV REFLEX: NO
BKR LAB AP PREVIOUS ABNORMAL: NORMAL
PATH REPORT.COMMENTS IMP SPEC: NORMAL
PATH REPORT.COMMENTS IMP SPEC: NORMAL
PATH REPORT.RELEVANT HX SPEC: NORMAL

## 2025-01-16 ENCOUNTER — OFFICE VISIT (OUTPATIENT)
Dept: PSYCHIATRY | Facility: CLINIC | Age: 25
End: 2025-01-16
Payer: COMMERCIAL

## 2025-01-16 DIAGNOSIS — F43.10 POSTTRAUMATIC STRESS DISORDER: ICD-10-CM

## 2025-01-16 DIAGNOSIS — F33.1 MAJOR DEPRESSIVE DISORDER, RECURRENT, MODERATE (H): Primary | ICD-10-CM

## 2025-01-16 NOTE — PROGRESS NOTES
OUTPATIENT PSYCHOTHERAPY PROGRESS NOTE    Client Name: Cassidy Fernandes   YOB: 2000 (24 year old)   Date of Service: 1/16/25  Time of Service: 2:23pm to 3:00pm (38 minutes)  Service Type(s): 18196 psychotherapy (38-52 min. w/ patient and/or family)  Type of service: In-person at [facility] clinic     Diagnoses:   F33.1 Major Depressive Disorder, recurrent episode, moderate severity  F43.10 Post-Traumatic Stress Disorder     Other: interpersonal/relationship stressors, postpartum adjustment     Individuals Present:   Cassidy    Treatment goal(s) being addressed:   Decrease symptoms depression/anger  Increase self-care and coping skills (michael, fulfillment, hobbies, social support)  Improve interpersonal effectiveness (boundaries, communication/conflict resolution, self-advocacy)    Subjective:  Cassidy provided updates from the past month. Her sone recently turned 2 years old. She and other family members have been coping with viruses/illnesses off and on.  There is ongoing tension and conflict among some of her family members (siblings and mother) and stress around her mother selling their family home. Cassidy is trying to be less involved, as when she tries to offer help or advice, her siblings become upset with her. She and Douglas (partner) are doing better with communication and dividing parenting and household responsibilities. Cassidy would like more care and affection from him, but resents having to ask for it explicitly.    Treatment:   Treatment modality is cognitive behavioral therapy and supportive psychotherapy. Session focused on processing thoughts and feelings around recent successes and challenges. Specifically surrounding interpersonal challenges among family members. Therapeutic strategies used include: reflective listening, validation, and communication skill review.     Assessment and Progress:   Behavioral Observations: Cassidy was late to session today. Cassidy presented with euthymic affect.  Cassidy was open and reflective throughout the session. She was receptive to therapeutic support provided today.    Assessment: Cassidy is doing better, overall. Mood and relationships are mostly stable, with some challenges among family members outside the home. Cassidy is navigating these challenges effectively.  There are no safety concerns (Cassidy denies ideation, plan, or intent to harm self or others). Cassidy is making good progress towards treatment goals.     Plan:   Next therapy appointment has been scheduled for 1/29/25 to continue work on treatment goals.      Anna Marie Gonzalez, PhD,   Clinical Psychologist      Treatment Plan review due: 2/7/25

## 2025-02-26 ENCOUNTER — OFFICE VISIT (OUTPATIENT)
Dept: PSYCHIATRY | Facility: CLINIC | Age: 25
End: 2025-02-26
Payer: COMMERCIAL

## 2025-02-26 DIAGNOSIS — F33.1 MAJOR DEPRESSIVE DISORDER, RECURRENT, MODERATE (H): Primary | ICD-10-CM

## 2025-02-26 DIAGNOSIS — F43.10 POSTTRAUMATIC STRESS DISORDER: ICD-10-CM

## 2025-02-26 ASSESSMENT — ANXIETY QUESTIONNAIRES
IF YOU CHECKED OFF ANY PROBLEMS ON THIS QUESTIONNAIRE, HOW DIFFICULT HAVE THESE PROBLEMS MADE IT FOR YOU TO DO YOUR WORK, TAKE CARE OF THINGS AT HOME, OR GET ALONG WITH OTHER PEOPLE: SOMEWHAT DIFFICULT
6. BECOMING EASILY ANNOYED OR IRRITABLE: SEVERAL DAYS
GAD7 TOTAL SCORE: 13
7. FEELING AFRAID AS IF SOMETHING AWFUL MIGHT HAPPEN: SEVERAL DAYS
GAD7 TOTAL SCORE: 13
3. WORRYING TOO MUCH ABOUT DIFFERENT THINGS: MORE THAN HALF THE DAYS
5. BEING SO RESTLESS THAT IT IS HARD TO SIT STILL: MORE THAN HALF THE DAYS
1. FEELING NERVOUS, ANXIOUS, OR ON EDGE: MORE THAN HALF THE DAYS
2. NOT BEING ABLE TO STOP OR CONTROL WORRYING: MORE THAN HALF THE DAYS
4. TROUBLE RELAXING: NEARLY EVERY DAY

## 2025-02-26 ASSESSMENT — PATIENT HEALTH QUESTIONNAIRE - PHQ9: SUM OF ALL RESPONSES TO PHQ QUESTIONS 1-9: 14

## 2025-02-26 NOTE — PROGRESS NOTES
"OUTPATIENT PSYCHOTHERAPY PROGRESS NOTE    Client Name: Cassidy Fernandes   YOB: 2000 (24 year old)   Date of Service: 2/26/25  Time of Service: 4:03pm to 5:10pm (67 minutes)  Service Type(s): 28739 psychotherapy (53+ min. w/ patient and/or family)  Type of service: In-person at [facility] clinic     Diagnoses:   F33.1 Major Depressive Disorder, recurrent episode, moderate severity  F43.10 Post-Traumatic Stress Disorder     Other: interpersonal/relationship stressors, postpartum adjustment     Individuals Present:   Cassidy    Treatment goal(s) being addressed:   Decrease symptoms depression/anger  Increase self-care and coping skills (michael, fulfillment, hobbies, social support)  Improve interpersonal effectiveness (boundaries, communication/conflict resolution, self-advocacy)    Subjective:  Cassidy provided updates from the past month and a half. She has been stressed and struggling with her mental health. Cassidy missed several business group meetings and so was \"kicked out\" of the business collaborative group. 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      Cassidy has been caring for Eddi fulltime for the past two weeks. She feels good about this decision. She knows that Eddi is safe and she is happy to enjoy these lisa early months with him.  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    Cassidy reported that she and Douglas have been trying for a second baby. They have been trying since November. Cassidy feels sad and stressed because she has not " "gotten pregnant yet. She stopped taking antidepressant meds in January (titrated off gradually). She feels that recent mental health/mood challenges have had more to do with interpersonal stressors than getting off of meds. Cassidy also recounted her stressful pregnancy and delivery and optimism that a second pregnancy and delivery will be easier.    Treatment:   Treatment modality is cognitive behavioral therapy and supportive psychotherapy. Session focused on processing thoughts and feelings around recent successes and challenges. Specifically surrounding role adjustment to stay at home mom, and grief and stress around not getting pregnant yet. Also some trauma processing around traumatic labor and delivery. Therapeutic strategies used include: reflective listening, validation, and trauma processing.     Assessment and Progress:   Behavioral Observations: Cassidy presented with variable affect appropriate to context (tearful at times). Cassidy was open and reflective throughout the session. She was receptive to therapeutic support provided today.    Assessment: Cassidy has been struggling with mental health lately. She acknowledged \"avoiding\" therapy and having low motivation to prioritize herself. She is doing slightly better since deciding to care for Eddi fulltime, but continues to feel sad and anxious about not getting pregnant yet. There are no safety concerns (Cassidy denies ideation, plan, or intent to harm self or others). Cassidy is making gradual progress towards treatment goals.         11/7/2024     3:00 PM 1/7/2025     2:39 PM 2/26/2025     4:00 PM   PHQ   PHQ-9 Total Score 9 8  14   Q9: Thoughts of better off dead/self-harm past 2 weeks Not at all Not at all Not at all       Patient-reported      GAD7 = 13    Plan:   Next therapy appointment has been scheduled for 3/12/25 to continue work on treatment goals.      Anna Marie Gonzalez, PhD,   Clinical Psychologist      Treatment Plan review due: 2/7/25     "

## 2025-03-13 ENCOUNTER — OFFICE VISIT (OUTPATIENT)
Dept: PSYCHIATRY | Facility: CLINIC | Age: 25
End: 2025-03-13
Payer: COMMERCIAL

## 2025-03-13 ENCOUNTER — BEH TREATMENT PLAN (OUTPATIENT)
Dept: PSYCHIATRY | Facility: CLINIC | Age: 25
End: 2025-03-13
Payer: COMMERCIAL

## 2025-03-13 DIAGNOSIS — F33.1 MAJOR DEPRESSIVE DISORDER, RECURRENT, MODERATE (H): Primary | ICD-10-CM

## 2025-03-13 DIAGNOSIS — F43.10 POSTTRAUMATIC STRESS DISORDER: ICD-10-CM

## 2025-03-13 NOTE — PROGRESS NOTES
OUTPATIENT TREATMENT PLAN SUMMARY    Client Name: Cassidy Fernandes   YOB: 2000 (24 year old)   Date of Treatment Plan: 3/13/25 (90 day review date: 6/13/25)   Date of initial service: 6/25/24                               1. DSM-V Diagnoses:   F33.1 Major Depressive Disorder, recurrent episode, moderate severity  F43.10 Post-Traumatic Stress Disorder     Other: interpersonal/relationship stressors, postpartum adjustment     2. Current symptoms and circumstances that substantiate the diagnosis:   Depressed mood and/or irritability most days, episodes of tearfulness, negative cognitions (hopelessness, low self-esteem, feelings of shame/guilt), anhedonia, changes in appetite (lower) and energy (lower).    Trauma symptoms: intrusive memories, avoidance, negative view of self/others/world, hyperarousal symptoms.    3. How symptoms and/or behaviors are affecting level of function:   Symptoms above have increased significantly since Cassidy experienced a challenging birth post-partum period. Several interpersonal stressors have exacerbated mood symptoms, including: high mobility, financial challenges, death of father, relationship conflict with partner. Symptoms interfere with Cassidy's daily functioning, impact her personal relationships, and ability to be as present and joyful in her role as a new mother as she would like.    4. Risk Assessment:  Suicide:  Assessed Level of Immediate Risk: None  Ideation: No  Plan:  No  Means: No  Intent: No    Homicide/Violence:  Assessed Level of Immediate Risk: None  Ideation: No  Plan: No  Means: No  Intent: No    If on a medication, please include name and dosage:  None      Symptom/Problem Measurable Goals Interventions Gains Made   1. Depression/anger 1.  Cassidy will score in average to mild range on the [place] Depression Inventory (BDI)  or PHQ9    6/25/24 BDI Total = 35 severe range    10/21/24 PHQ9 = 1 normal    11/7/24 PHQ9 = 9 mild    11/7/24 GAD7 = 10 mderate  "1.CBT, supportive psychotherapy 1. Moderate to significant improvement - Cassidy has had flares on increased depression during periods of more stressful adjustment with social stressors (e.g., role changes), however, overall her mood has significantly improved. She is no longer taking medication (due to goal of getting pregnant), but has tolerated that change well. Current mood is described as positive and stable over the past two weeks.  She no longer experiences \"rages\" and irritability has dissipated.   2.Limited coping strategies, limited pleasurable activities 2. Cassidy will increase use of health coping strategies by 80%.     Once a week she will connect with someone socially for pleasure, or will engage in a hobby/activity for michael/pleasure. 2. CBT, supportive psychotherapy 2. Moderate to significant - Cassidy is staying active and busy with her toddler. She is connecting with family, though would still like to make friends.   3. Interpersonal challenges 3.  Cassidy will increase interpersonal effectiveness (e.g., improved boundary setting, increased self-advocacy, improved conflict resolution/communication skills), per self-reported reduction in interpersonal conflicts by 50%  3. Communication and interpersonal skill building  3. Significant improvement - Cassidy is communicating needs and boundaries more clearly and effectively with others. She and her partner's relationship is positive, supportive, and stable.     5. Frequency of Sessions: Therapy will switch to monthly.    6. Discharge and Aftercare Goals: To be determined.     7. Expected duration of treatment:  12-18 months    8. Participants in therapy plan (family, friends, support network): Dr. Carlos Benjamin      Treatment Plan and goals were reviewed on 3/13/25 and verbal consent was obtained. Please see encounter note for Acknowledgement of Current Treatment Plan          Regulatory Guidelines for Updating Treatment Plan  [place] Medical Assistance: " Reviewed & signed at least every 90days  Medicare:  Update per policy    Anna Marie Gonzalez, PhD, LP      Licensed Psychologist

## 2025-03-13 NOTE — PROGRESS NOTES
"OUTPATIENT PSYCHOTHERAPY PROGRESS NOTE    Client Name: Cassidy Fernandes   YOB: 2000 (24 year old)   Date of Service: 3/13/25  Time of Service: 4:10pm to 5:10pm (60 minutes)  Service Type(s): 61867 psychotherapy (53+ min. w/ patient and/or family)  Type of service: In-person at [facility] clinic     Diagnoses:   F33.1 Major Depressive Disorder, recurrent episode, moderate severity  F43.10 Post-Traumatic Stress Disorder     Other: interpersonal/relationship stressors, postpartum adjustment     Individuals Present:   Cassidy    Treatment goal(s) being addressed:   Decrease symptoms depression/anger  Increase self-care and coping skills (michael, fulfillment, hobbies, social support)  Improve interpersonal effectiveness (boundaries, communication/conflict resolution, self-advocacy)    Subjective:  Cassidy provided updates from the past two weeks. Things have been going much better. Cassidy is enjoying caring for Eddi fulltime.  She and Douglas are getting along really well and things feel much less stressful. 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 She has been enjoying spending more time with her mother, who now [place]. Cassidy is also finding it helpful to not take on other people's problems as much and instead focusing on herself and her own family vs. being the \"fixer\" for everyone else. Mood has improved significantly.    Treatment:   Treatment modality is cognitive behavioral therapy and supportive psychotherapy. Session focused on processing thoughts and feelings around recent successes and challenges. Specifically processed Cassidy's adjustment into the new roles and changes in her life, which she feels have been very positive, and the healthier boundaries she is setting with others. Therapeutic strategies used include: reflective listening, validation, and cognitive " processing (recognizing growth and gains from challenging experiences).     Treatment plan and goal progress was reviewed. Cassidy proposed switching to monthly appointments given her stability and good progress, which this provider is in support of.    Assessment and Progress:   Behavioral Observations: Cassidy presented with euthymic affect. Cassidy was open and reflective throughout the session. She was receptive to therapeutic support provided today.    Assessment: Cassidy's mood and functioning have improved significantly following a period of disruption/stress/transition.  She has now stabilized and adjusted to what she now recognizes as positive changes. There are no safety concerns (Cassidy denies ideation, plan, or intent to harm self or others). Cassidy is making good progress towards treatment goals.     Plan:   Next therapy appointment has been scheduled for 4/10/25 to continue work on treatment goals. It was mutually decided to switch to monthly appointments given Cassidy's good progress.      Anna Marie Gonzalez, PhD,   Clinical Psychologist      Treatment Plan review due: 6/13/25

## 2025-04-02 ENCOUNTER — OFFICE VISIT (OUTPATIENT)
Dept: FAMILY MEDICINE | Facility: CLINIC | Age: 25
End: 2025-04-02
Payer: COMMERCIAL

## 2025-04-02 VITALS
RESPIRATION RATE: 18 BRPM | SYSTOLIC BLOOD PRESSURE: 107 MMHG | WEIGHT: 203.4 LBS | HEIGHT: 65 IN | TEMPERATURE: 98.3 F | BODY MASS INDEX: 33.89 KG/M2 | OXYGEN SATURATION: 100 % | HEART RATE: 81 BPM | DIASTOLIC BLOOD PRESSURE: 67 MMHG

## 2025-04-02 DIAGNOSIS — L71.0 PERIORAL DERMATITIS: ICD-10-CM

## 2025-04-02 DIAGNOSIS — L20.9 ATOPIC DERMATITIS IN ADULT: ICD-10-CM

## 2025-04-02 DIAGNOSIS — N97.9 FEMALE INFERTILITY: Primary | ICD-10-CM

## 2025-04-02 LAB
ESTRADIOL SERPL-MCNC: 92 PG/ML
FSH SERPL IRP2-ACNC: 4.2 MIU/ML
HCG INTACT+B SERPL-ACNC: <1 MIU/ML
LH SERPL-ACNC: 5.7 MIU/ML
PROLACTIN SERPL 3RD IS-MCNC: 9 NG/ML (ref 5–23)
SHBG SERPL-SCNC: 50 NMOL/L (ref 30–135)
TSH SERPL DL<=0.005 MIU/L-ACNC: 1.1 UIU/ML (ref 0.3–4.2)

## 2025-04-02 PROCEDURE — 84443 ASSAY THYROID STIM HORMONE: CPT | Performed by: FAMILY MEDICINE

## 2025-04-02 PROCEDURE — 82670 ASSAY OF TOTAL ESTRADIOL: CPT | Performed by: FAMILY MEDICINE

## 2025-04-02 PROCEDURE — 84270 ASSAY OF SEX HORMONE GLOBUL: CPT | Performed by: FAMILY MEDICINE

## 2025-04-02 PROCEDURE — 83002 ASSAY OF GONADOTROPIN (LH): CPT | Performed by: FAMILY MEDICINE

## 2025-04-02 PROCEDURE — 99214 OFFICE O/P EST MOD 30 MIN: CPT | Performed by: FAMILY MEDICINE

## 2025-04-02 PROCEDURE — G2211 COMPLEX E/M VISIT ADD ON: HCPCS | Performed by: FAMILY MEDICINE

## 2025-04-02 PROCEDURE — 84702 CHORIONIC GONADOTROPIN TEST: CPT | Performed by: FAMILY MEDICINE

## 2025-04-02 PROCEDURE — 83001 ASSAY OF GONADOTROPIN (FSH): CPT | Performed by: FAMILY MEDICINE

## 2025-04-02 PROCEDURE — 84146 ASSAY OF PROLACTIN: CPT | Performed by: FAMILY MEDICINE

## 2025-04-02 PROCEDURE — 36415 COLL VENOUS BLD VENIPUNCTURE: CPT | Performed by: FAMILY MEDICINE

## 2025-04-02 RX ORDER — HYDROCORTISONE 25 MG/G
CREAM TOPICAL 2 TIMES DAILY
Qty: 30 G | Refills: 0 | Status: SHIPPED | OUTPATIENT
Start: 2025-04-02 | End: 2025-04-02

## 2025-04-02 RX ORDER — TRIAMCINOLONE ACETONIDE 1 MG/G
OINTMENT TOPICAL 2 TIMES DAILY
Qty: 80 G | Refills: 0 | Status: SHIPPED | OUTPATIENT
Start: 2025-04-02 | End: 2025-04-02

## 2025-04-02 ASSESSMENT — ASTHMA QUESTIONNAIRES
QUESTION_4 LAST FOUR WEEKS HOW OFTEN HAVE YOU USED YOUR RESCUE INHALER OR NEBULIZER MEDICATION (SUCH AS ALBUTEROL): NOT AT ALL
QUESTION_2 LAST FOUR WEEKS HOW OFTEN HAVE YOU HAD SHORTNESS OF BREATH: NOT AT ALL
QUESTION_5 LAST FOUR WEEKS HOW WOULD YOU RATE YOUR ASTHMA CONTROL: COMPLETELY CONTROLLED
ACT_TOTALSCORE: 25
QUESTION_3 LAST FOUR WEEKS HOW OFTEN DID YOUR ASTHMA SYMPTOMS (WHEEZING, COUGHING, SHORTNESS OF BREATH, CHEST TIGHTNESS OR PAIN) WAKE YOU UP AT NIGHT OR EARLIER THAN USUAL IN THE MORNING: NOT AT ALL
QUESTION_1 LAST FOUR WEEKS HOW MUCH OF THE TIME DID YOUR ASTHMA KEEP YOU FROM GETTING AS MUCH DONE AT WORK, SCHOOL OR AT HOME: NONE OF THE TIME

## 2025-04-02 NOTE — PROGRESS NOTES
Assessment & Plan     Female infertility   actively trying to conceive for the past 7 months without success.  Regular cycles every 24 to 26 days so suspect ovulating, though will begin infertility workup with labs and transvaginal ultrasound.  Referral placed to OB/GYN for further fertility planning.  Patient will continue on her prenatal vitamin.  - Ob/Gyn  Referral; Future  - Estradiol; Future  - Follicle stimulating hormone; Future  - Luteinizing Hormone; Future  - Prolactin; Future  - Testosterone Free and Total; Future  - TSH with free T4 reflex; Future  - US Pelvic Complete with Transvaginal; Future  - HCG quantitative pregnancy; Future    Atopic dermatitis in adult  Recommend daily antihistamine such as Zyrtec, Claritin, Allegra given exaggerated histamine response.  Patient provided topical steroid creams for flares including hydrocortisone 2.5% to face, and triamcinolone to trunk/extremities as needed.  Discussed avoiding consistent use more than 2 weeks at a time.  Also recommend changing to Free and clear detergent and avoiding dryer sheets.  - hydrocortisone 2.5 % cream; Apply topically 2 times daily. No more than 2 weeks at a time. Okay to use on face.  - triamcinolone (KENALOG) 0.1 % external ointment; Apply topically 2 times daily. For no more than 2 weeks at a time. Body only, not face.    Perioral dermatitis  Discussed changing to Sensodyne toothpaste and avoiding topical steroids.    Subjective   Cassidy is a 24 year old, presenting for the following health issues:  Fertility Questions (Having problems with conceiving, having irregular cycles, derm problem on skin, getting dry and itchy patches on forehead and around mouth and on back of arms, on back of leg, will appear randomly)        2025     3:14 PM   Additional Questions   Roomed by Karla DELCID CMA   Accompanied by self     HPI      2025 started trying to conceive  Tracking cycle, and using lh strips  Period monthly,  "q24-26 days  Lasts 4-6 days, this last period in March was shorter than usual  Has been pregnant once before, took 2 months to conceive at that time  Eats healthy, though lacks exercise    Previously seen by dermatology, thought contact dermattis in the past. Gets flares occasionally. Patches come and go, flares after every shower.   Unscented body wash and shampoo  Uses moisturizer routinely on face, often drop  Has not changed detergent  Used to have halobetasol prescription, has not been using this since trying to conceive        Objective    /67   Pulse 81   Temp 98.3  F (36.8  C) (Oral)   Resp 18   Ht 1.66 m (5' 5.35\")   Wt 92.3 kg (203 lb 6.4 oz)   SpO2 100%   BMI 33.49 kg/m    Body mass index is 33.49 kg/m .  Physical Exam   GENERAL: alert and no distress  Skin: Scattered dry, erythematous patches on upper and lower extremities, dry flaky skin around lips with blurred vermilion border  NECK: no adenopathy, no asymmetry, masses, or scars  RESP: lungs clear to auscultation - no rales, rhonchi or wheezes  CV: regular rate and rhythm, normal S1 S2, no S3 or S4, no murmur  PSYCH: mentation appears normal, affect normal/bright            Signed Electronically by: Love Lee DO    Answers submitted by the patient for this visit:  Patient Health Questionnaire (Submitted on 4/2/2025)  If you checked off any problems, how difficult have these problems made it for you to do your work, take care of things at home, or get along with other people?: Somewhat difficult  PHQ9 TOTAL SCORE: 8    "

## 2025-04-02 NOTE — PATIENT INSTRUCTIONS
"Zyrtec, claritin or allegra daily    Gentle Skin Care    Moisturizers:  Lighter; CetaphilCream, CeraVe, Aveeno and Vanicream Light   Thicker; Aquaphor Ointment, Vaseline, Petrolium Jelly, Eucerin and Vanicream  AvoidLotions (too thin)  Mild Cleansers:  Dove- Fragrance Free  CeraVe  Vanicream Cleansing Bar  Cetaphil Cleanser   Aquaphor 2 in1 Gentle Wash and Shampoo       Laundry Products:  All Free and Clear  Cheer Free  Generic Brands are okay as long as they are  Fragrance Free    Avoid fabric softeners  and dryer sheets   Sunscreens: SPF 30 or greater     Sunscreens that contain Zinc Oxide or Titanium Dioxide should be applied, these are physical blockers. Spray or  chemical  sunscreensshould be avoided.        Shampoo and Conditioners:  Free and Clear by Vanicream  Aquaphor 2 in 1 Gentle Wash and Shampoo  California Baby  super sensitive   Oils:  Mineral Oil   Emu Oil   For some patients, coconut and sunflower seedoil      Generic Products are an okay substitute, but make sure they are fragrance free.  *Avoid product that have fragrance added to them. Organic does not mean  fragrance free.  In fact patients with sensitive skin can become quite irritated by organic products.     Daily bathing is recommended. Make sureyou are applying a good moisturizer after bathing every time.  Use Moisturizing creams at least twice daily to the wholebody. Your provider may recommend a lighter or heavier moisturizer based on your child s severity and that time of year it is.  Creams are more moisturizing than lotions  Products should be fragrance free- soaps, creams, detergents.  Products such as Kaveh and Kaveh as well as the Cetaphil \"Baby\" line contain fragrance andmay irritate your child's sensitive skin.        Care Plan:  Keep bathing and showering short, less than 15 minutes   Always use lukewarm warm when possible. AVOID very HOT or COLDwater  DO NOT use bubble bath  Limit the use of soaps. Focus on the skin folds, " "face, armpits, groin and feet  Do NOT vigorously scrub when you cleanse your skin  After bathing, PAT your skin lightly with a towel. DO NOT rub or scrub when drying  ALWAYS apply a moisturizer immediately after bathing. This helps to  lock in  the moisture. * IF YOU WEREPRESCRIBED A TOPICAL MEDICATION, APPLY YOUR MEDICATION FIRST THEN COVER WITH YOUR DAILY MOISTURIZER  Reapply moisturizingagents at least twice daily to your whole body  Do not use products such as powders, perfumes, or colognes on your skin  Avoid saunas and steambaths. This temperature is too HOT  Avoid tight or  scratchy  clothing such as wool  Alwayswash new clothing before wearing them for the first time  Sometimes a humidifier or vaporizer can be used at night can help the dry skin. Remember to keep it clean to avoid moldgrowth.         Bleach Bath Instructions for severe eczema  What are dilute bleachbaths?  Dilute bleach baths are used to help fight bacteria that is commonly found on the skin; this bacteria may be preventing your skin from healing. If isalso used to calm inflammation in skin, even if infection is not present. The dilution ratio we recommend is the same concentration that is in a swimming pool.     Type;  *Regular,plain household bleach used for cleaning clothing. Brand or Generic is okay.   *Make sure this is plain or concentrated bleach. This should NOT be \"splashfree, splash less or color safe.\"   *There should not be any added fragrance to the bleach; such a lavender.    How do I make a dilute bleach bath?  *Fill your tub with lukewarm water with at least 4-6 inches of water.  *Pour 1/4 to 1/3 cup of bleach into an adult size bath tub.  *For smaller tubs (infant tubs), add two tablespoons of bleach tothe tub water. * Bleach baths work better if your child is able to submerge most of their skin, so consider placing the infant tub in the larger tub.   *Repeat bleach baths as recommended by your provider.    Other " information:  *Do not pour bleach directly onto the skin.  *If is safe to get the bleachmixture on your face and scalp.  *Do not drink the bleach mixture.  *Keep bleach bottle out of reach of children.        ATOPIC DERMATITIS  WHAT IS ATOPIC DERMATITIS?  Atopic dermatitis (also called Eczema) is a condition of the skin where the skin is dry, red, and itchy. The mainfunction of the skin is to provide a barrier from the environment and is also the first defense of the immune system.    In atopic dermatitis the skin barrier is decreased, and the skin is easily irritated. Also, theskin s immune system is different. If there are increased allergic type cells in theskin, the skin may become red and  hyper-excitable.  This leads to itching and a subsequent rash.    WHY DOPEOPLE GET ATOPIC DERMATITIS?  There is no single answer because many factors are involved. It is likely a combination of genetic makeup and environmentaltriggers and /or exposures; Excessive drying or sweating of the skin, irritating soaps, dust mites, and pet dander area some of the more common triggers. There are no blood tests that can be done to confirm this diagnosis.This history and appearance of the skin is usually sufficient for a diagnosis. However, in some cases if the rash does not fit with the history or respond appropriately to treatment, a skin biopsy may be helpful. Manychildren do outgrow atopic dermatitis or get better; however, many continue to have sensitive skin into adulthood.    Asthma and hay fever area seen in many patients with atopic dermatitis; however, asthma flares do notnecessarily occur at the same time as skin flare ups.     PREVENTING FLARES OF ATOPIC DERMATITIS  The first step is to maintain the skin s barrier function. Keep the skin wellmoisturized. Avoid irritants and triggers. Use prescription medicine when there are red or rough areas to help the skin to return to normal as quickly as possible. Try to limit  scratching.    IF EVERYTHING IS BEING DONE AS IT SHOULD, WHY DOES THE RASH KEEP FLARING?  If you keep the skin well moisturized, andavoid coming in contact with things you know irritate your child s skin, there will beless flares. However, some flares of atopic dermatitis are beyond your control. You should work with your physician to come up with a plan that minimizes flares while minimizing long term use of medications that suppress theimmune system.    WHAT ARE THE TRIGGERS?  Triggers are different for different people. The most common triggers are:  Heat and sweat for some individuals and cold weather for others  House dust mites, pet fur  Wool; synthetic fabrics like nylon; dyedfabrics  Tobacco smoke  Fragrance in; shampoos, soaps, lotions, laundry detergents, fabric softeners  Saliva or prolonged exposure to water    WHAT ABOUT FOOD ALLERGIES?  This is a very controversial topic; as many believe that food allergies are responsible for skin flares. In some cases, specific foods may cause worsening ofatopic dermatitis. However, this occurs in a minority of cases and usually happens within a few hours of ingestion. While food allergy is more common in children with eczema, foods are specific triggers for flares in only asmall percentage of children. If you notice that the skin flares after certain food, you can see if eliminating one food at a time makes a difference, as long as your child can still enjoy a well-balanced diet.    Thereare blood (RAST) and skin (PRICK) tests that can check for allergies, but they are often positive in children who are not truly allergic. Therefore, it is important that you work with your allergist and dermatologist todetermine which foods are relevant and causing true symptoms. Extreme food elimination diets without the guidance of your doctor, which have become more popular in recent years, may even results in worsening of the skin rashdue to malnutrition and avoidance of  essential nutrients.    TREATMENT:   Treatments areaimed at minimizing exposure to irritating factors and decreasing the skin inflammation which results in an itchy rash.    There are many different treatment options, which depend on your child s rash, its location and severity. Topical treatments include corticosteroids and steroid-like creams such asProtopic and Elidel which do not thin the skin. Please read the discussions below regarding risks and benefits of all these creams.    Occasionally bacterial or viral infections can occur which flare the skin andrequire oral and/or topical antibiotics or antiviral. In some cases bleach baths 2-3 times weekly can be helpful to prevent recurrent infection.    For severe disease, strong oral medications such as methotrexate orazathioprine (Imuran) may be needed. There medications require close monitoring and follow-up. You should discuss the risks/benefits/alternatives or these medications with your dermatologist to come up with the besttreatment plan for your child.    Further Information:  There is much more information available from the [facility] website: [email or web address].org

## 2025-04-03 ENCOUNTER — HOSPITAL ENCOUNTER (OUTPATIENT)
Dept: ULTRASOUND IMAGING | Facility: HOSPITAL | Age: 25
Discharge: HOME | End: 2025-04-03
Attending: FAMILY MEDICINE
Payer: COMMERCIAL

## 2025-04-03 DIAGNOSIS — N97.9 FEMALE INFERTILITY: ICD-10-CM

## 2025-04-03 PROCEDURE — 76856 US EXAM PELVIC COMPLETE: CPT

## 2025-04-08 LAB
TESTOST FREE SERPL-MCNC: 0.21 NG/DL
TESTOST SERPL-MCNC: 15 NG/DL (ref 8–60)

## 2025-04-16 ENCOUNTER — TELEPHONE (OUTPATIENT)
Dept: MIDWIFE SERVICES | Facility: CLINIC | Age: 25
End: 2025-04-16
Payer: COMMERCIAL

## 2025-04-16 NOTE — TELEPHONE ENCOUNTER
----- Message from Natacha Mccracken sent at 4/12/2025 11:16 AM CDT -----  Regarding: Can you help her get set up with one of the OB/Gyn MDs or one of the [facility]  Hello wonderful schedulers     It looks like Cassidy has seen family medicine and had the initial infertility work up that I would do.  Can you help her get set up with one the the OB/Gyn physicians or one of the [facility] sisters?    Thank you  burke

## 2025-04-23 NOTE — PROGRESS NOTES
"OUTPATIENT PSYCHOTHERAPY PROGRESS NOTE    Client Name: Cassidy Fernandes   YOB: 2000 (24 year old)   Date of Service: 4/24/25  Time of Service: 3:00pm to 4:07pm (67 minutes)  Service Type(s): 48521 psychotherapy (53+ min. w/ patient and/or family)  Type of service: In-person at [facility] clinic     Diagnoses:   F33.1 Major Depressive Disorder, recurrent episode, moderate severity  F43.10 Post-Traumatic Stress Disorder     Other: interpersonal/relationship stressors, postpartum adjustment     Individuals Present:   Cassidy    Treatment goal(s) being addressed:   Decrease symptoms depression/anger  Increase self-care and coping skills (michael, fulfillment, hobbies, social support)  Improve interpersonal effectiveness (boundaries, communication/conflict resolution, self-advocacy)    Subjective:  Cassidy provided updates from the past month. Cassidy has been struggling with mood and relationship stress. Specifically she has been feeling sad, angry, resentful, and guilty. She has not been getting pregnant and her partner recently expressed that he did not feel it was a good time for them to get pregnant anyway. He feels they are financially struggling without Cassidy working and that they also need to work of their relationship first.    Cassidy processed frustrations and challenges in their relationship dynamic. Cassidy feels she does most of the household labor and that her partner does not help or support, even when she asks him repeatedly. He accuse her of nagging. She wants more emotional intimacy and closeness. She does not feel heard or respected. Her partner does not support her dreams of pursuing a career in carpentry/construction, which she resents.    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  She is disappointed in herself that she \"hops jobs\" and has not built a career or stuck with the same job.    Treatment:   Treatment modality is trauma-informed cognitive behavioral therapy and " supportive psychotherapy. Session focused on processing thoughts and feelings around recent interpersonal challenges. Discussed communication and conflict resolution strategies, and boundary setting skills, to support getting more of her needs met/supported. Therapeutic strategies used include: reflective listening, validation, and review of effective communication skills.     Assessment and Progress:   Behavioral Observations: Cassidy presented with tearful affect throughout the session. Cassidy was open and reflective throughout the session. She was receptive to therapeutic support provided today.    Assessment: Cassidy's mood and functioning have backslid. She has been more stressed, depressed and tearful. Increased relationship stress and conflict due to financial stress and communication challenges. No safety concerns (Cassidy denies ideation, plan, or intent to harm self or others). Cassidy is making gradual progress towards treatment goals.     Plan:   Next therapy appointment has been scheduled for 5/8/25 to continue work on treatment goals. It was mutually decided to continue with biweekly appointments given increased challenges.      Anna Marie Gonzalez, PhD,   Clinical Psychologist      Treatment Plan review due: 6/13/25

## 2025-04-24 ENCOUNTER — OFFICE VISIT (OUTPATIENT)
Dept: PSYCHIATRY | Facility: CLINIC | Age: 25
End: 2025-04-24
Payer: COMMERCIAL

## 2025-04-24 DIAGNOSIS — F43.10 POSTTRAUMATIC STRESS DISORDER: ICD-10-CM

## 2025-04-24 DIAGNOSIS — F33.1 MAJOR DEPRESSIVE DISORDER, RECURRENT, MODERATE (H): Primary | ICD-10-CM

## 2025-06-04 ENCOUNTER — E-VISIT (OUTPATIENT)
Dept: FAMILY MEDICINE | Facility: CLINIC | Age: 25
End: 2025-06-04
Payer: COMMERCIAL

## 2025-06-04 DIAGNOSIS — N92.6 MISSED MENSES: Primary | ICD-10-CM

## 2025-06-04 NOTE — PATIENT INSTRUCTIONS
Thank you for choosing us for your care. I have placed the below lab(s) for you:  Orders Placed This Encounter   Procedures     HCG qualitative, Blood ([id number])        To schedule your lab appointment, please click the Schedule button in your [facility] Home Page.

## 2025-06-04 NOTE — PROGRESS NOTES
"OUTPATIENT PSYCHOTHERAPY PROGRESS NOTE    Client Name: Cassidy Fernandes   YOB: 2000 (24 year old)   Date of Service: 6/05/25  Time of Service: 4:22pm to 5:32pm (70 minutes)  Service Type(s): 53379 psychotherapy (53+ min. w/ patient and/or family)  Type of service: In-person at [facility] clinic     Diagnoses:   F33.1 Major Depressive Disorder, recurrent episode, moderate severity  F43.10 Post-Traumatic Stress Disorder     Other: interpersonal/relationship stressors, postpartum adjustment     Individuals Present:   Cassidy    Treatment goal(s) being addressed:   Decrease symptoms depression/anger  Increase self-care and coping skills (michael, fulfillment, hobbies, social support)  Improve interpersonal effectiveness (boundaries, communication/conflict resolution, self-advocacy)    Subjective:  Cassidy provided updates from the past month. 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 She and her partner are getting along well and are less stressed about finances now that she's working.  He is also planning to start his own construction business. Cassidy is still hoping to expand their family in the near future. Their is a chance she might be pregnant now. She is getting a blood test this weekend. She has mixed feelings (\"roller coaster of emotions\") during this waiting period.    Treatment:   Treatment modality is trauma-informed cognitive behavioral therapy and supportive psychotherapy. Session focused on processing thoughts and feelings around adjustment to new job and possibility of being pregnant. Therapeutic strategies used include: reflective listening, validation, and review of effective communication skills.     Assessment and Progress:   Behavioral Observations: Cassidy presented with euthymic affect throughout the session. Cassidy was open and reflective throughout the session. She was receptive to therapeutic support provided " today.    Assessment: Cassidy's mood and functioning have stabilized. She experiences some stress, but is managing better and is feeling optimistic. No safety concerns (Cassidy denies ideation, plan, or intent to harm self or others). Cassidy is making gradual progress towards treatment goals.     Plan:   Next therapy appointment has been scheduled for 6/26/25 to continue work on treatment goals. Cassidy is hoping to move to monthly frequency given work and scheduling conflicts.  Will revisit plan next session.       Anna Marie Gonzalez, PhD,   Clinical Psychologist      Treatment Plan review due: 6/13/25

## 2025-06-05 ENCOUNTER — OFFICE VISIT (OUTPATIENT)
Dept: PSYCHIATRY | Facility: CLINIC | Age: 25
End: 2025-06-05
Payer: COMMERCIAL

## 2025-06-05 DIAGNOSIS — F33.1 MAJOR DEPRESSIVE DISORDER, RECURRENT, MODERATE (H): Primary | ICD-10-CM

## 2025-06-05 DIAGNOSIS — F43.10 POSTTRAUMATIC STRESS DISORDER: ICD-10-CM

## 2025-06-06 ENCOUNTER — RESULTS FOLLOW-UP (OUTPATIENT)
Dept: FAMILY MEDICINE | Facility: CLINIC | Age: 25
End: 2025-06-06

## 2025-06-24 ENCOUNTER — OFFICE VISIT (OUTPATIENT)
Dept: FAMILY MEDICINE | Facility: CLINIC | Age: 25
End: 2025-06-24
Payer: COMMERCIAL

## 2025-06-24 VITALS
SYSTOLIC BLOOD PRESSURE: 127 MMHG | OXYGEN SATURATION: 100 % | HEIGHT: 64 IN | TEMPERATURE: 98.2 F | BODY MASS INDEX: 33.97 KG/M2 | HEART RATE: 92 BPM | WEIGHT: 199 LBS | RESPIRATION RATE: 16 BRPM | DIASTOLIC BLOOD PRESSURE: 77 MMHG

## 2025-06-24 DIAGNOSIS — L84 CALLUS OF TOE: ICD-10-CM

## 2025-06-24 DIAGNOSIS — N97.9 FEMALE INFERTILITY: Primary | ICD-10-CM

## 2025-06-24 LAB — HCG UR QL: NEGATIVE

## 2025-06-24 PROCEDURE — G2211 COMPLEX E/M VISIT ADD ON: HCPCS | Performed by: FAMILY MEDICINE

## 2025-06-24 PROCEDURE — 99214 OFFICE O/P EST MOD 30 MIN: CPT | Performed by: FAMILY MEDICINE

## 2025-06-24 PROCEDURE — 81025 URINE PREGNANCY TEST: CPT | Performed by: FAMILY MEDICINE

## 2025-06-24 ASSESSMENT — ANXIETY QUESTIONNAIRES
4. TROUBLE RELAXING: NOT AT ALL
7. FEELING AFRAID AS IF SOMETHING AWFUL MIGHT HAPPEN: NOT AT ALL
6. BECOMING EASILY ANNOYED OR IRRITABLE: SEVERAL DAYS
3. WORRYING TOO MUCH ABOUT DIFFERENT THINGS: SEVERAL DAYS
5. BEING SO RESTLESS THAT IT IS HARD TO SIT STILL: NOT AT ALL
GAD7 TOTAL SCORE: 3
GAD7 TOTAL SCORE: 3
IF YOU CHECKED OFF ANY PROBLEMS ON THIS QUESTIONNAIRE, HOW DIFFICULT HAVE THESE PROBLEMS MADE IT FOR YOU TO DO YOUR WORK, TAKE CARE OF THINGS AT HOME, OR GET ALONG WITH OTHER PEOPLE: NOT DIFFICULT AT ALL
1. FEELING NERVOUS, ANXIOUS, OR ON EDGE: NOT AT ALL
7. FEELING AFRAID AS IF SOMETHING AWFUL MIGHT HAPPEN: NOT AT ALL
8. IF YOU CHECKED OFF ANY PROBLEMS, HOW DIFFICULT HAVE THESE MADE IT FOR YOU TO DO YOUR WORK, TAKE CARE OF THINGS AT HOME, OR GET ALONG WITH OTHER PEOPLE?: NOT DIFFICULT AT ALL
GAD7 TOTAL SCORE: 3
2. NOT BEING ABLE TO STOP OR CONTROL WORRYING: SEVERAL DAYS

## 2025-06-24 ASSESSMENT — PATIENT HEALTH QUESTIONNAIRE - PHQ9
SUM OF ALL RESPONSES TO PHQ QUESTIONS 1-9: 2
SUM OF ALL RESPONSES TO PHQ QUESTIONS 1-9: 2
10. IF YOU CHECKED OFF ANY PROBLEMS, HOW DIFFICULT HAVE THESE PROBLEMS MADE IT FOR YOU TO DO YOUR WORK, TAKE CARE OF THINGS AT HOME, OR GET ALONG WITH OTHER PEOPLE: NOT DIFFICULT AT ALL

## 2025-06-24 NOTE — PROGRESS NOTES
Assessment & Plan     Female infertility  Trying to conceive x 10 months om patient with previously regular menses.  UPT has been negative but will reassess today.  Infertility labs previously obtained 2025 and overall reassuring with normal pelvic ultrasound.  After discussion, will refer to OB/GYN/reproductive endocrinology for further evaluation.  - Ob/Gyn  Referral; Future  - HCG Qual, Urine ([id number]); Future    Callus of toe  Recommend warm water soak and then gently paring down callus.  Reviewed importance of supportive, lace up shoes.      The longitudinal plan of care for the diagnosis(es)/condition(s) as documented were addressed during this visit. Due to the added complexity in care, I will continue to support Cassidy in the subsequent management and with ongoing continuity of care.    30 minutes spent by me on the date of the encounter doing review of test results, interpretation of tests, patient visit, documentation, and discussion with family     Subjective   Cassidy is a 24 year old, presenting for the following health issues:  Abnormal Bleeding Problem and Foot Problems        2025     2:51 PM   Additional Questions   Roomed by Trinidad SANCHEZ CMA   Accompanied by Mom and son     Abnormal Bleeding Problem    History of Present Illness       Reason for visit:  Fertility   She is taking medications regularly.        Last cycle 2 days long and extremely light and late  Patient's last menstrual period was 2025 (exact date).  No positive pregnancy tests  2 months ago started new job the post [employer] had been working boots, but recently bought a new pair of shoes.  Discomfort on base of fourth left toe.  Feels she has been compensating so having mid plantar foot pain as well.  25k steps per day  2024 started trying to conceive.    at 3 year old boy  Easily conceived first child.  Was on Depo shortly after delivery, though periods have been regular up until 1 to 2 months ago.   "Disappointment/sadness with negative UPT's.      Objective    /77 (BP Location: Left arm, Patient Position: Sitting, Cuff Size: Adult Regular)   Pulse 92   Temp 98.2  F (36.8  C) (Oral)   Resp 16   Ht 1.613 m (5' 3.5\")   Wt 90.3 kg (199 lb)   LMP 06/02/2025 (Exact Date)   SpO2 100%   BMI 34.70 kg/m    Body mass index is 34.7 kg/m .  Physical Exam   GENERAL: alert and no distress  RESP: lungs clear to auscultation - no rales, rhonchi or wheezes  CV: regular rate and rhythm, normal S1 S2, no S3 or S4, no murmur  Skin: Excessive callus formation medial aspect left fourth toe  PSYCH: mentation appears normal, affect normal/bright            Signed Electronically by: Love Lee DO    "

## 2025-06-25 ENCOUNTER — PATIENT OUTREACH (OUTPATIENT)
Dept: CARE COORDINATION | Facility: CLINIC | Age: 25
End: 2025-06-25
Payer: COMMERCIAL

## 2025-06-25 ENCOUNTER — RESULTS FOLLOW-UP (OUTPATIENT)
Dept: FAMILY MEDICINE | Facility: CLINIC | Age: 25
End: 2025-06-25

## 2025-07-26 ENCOUNTER — MYC MEDICAL ADVICE (OUTPATIENT)
Dept: FAMILY MEDICINE | Facility: CLINIC | Age: 25
End: 2025-07-26
Payer: COMMERCIAL

## 2025-07-30 ENCOUNTER — TELEPHONE (OUTPATIENT)
Dept: MIDWIFE SERVICES | Facility: CLINIC | Age: 25
End: 2025-07-30
Payer: COMMERCIAL

## 2025-07-30 ENCOUNTER — OFFICE VISIT (OUTPATIENT)
Dept: URGENT CARE | Facility: URGENT CARE | Age: 25
End: 2025-07-30
Payer: COMMERCIAL

## 2025-07-30 VITALS
SYSTOLIC BLOOD PRESSURE: 141 MMHG | RESPIRATION RATE: 20 BRPM | HEART RATE: 96 BPM | OXYGEN SATURATION: 97 % | DIASTOLIC BLOOD PRESSURE: 85 MMHG | TEMPERATURE: 99.2 F

## 2025-07-30 DIAGNOSIS — N92.6 MISSED PERIOD: Primary | ICD-10-CM

## 2025-07-30 LAB — HCG UR QL: POSITIVE

## 2025-07-30 PROCEDURE — 81025 URINE PREGNANCY TEST: CPT | Performed by: PHYSICIAN ASSISTANT

## 2025-07-30 PROCEDURE — 99213 OFFICE O/P EST LOW 20 MIN: CPT | Performed by: PHYSICIAN ASSISTANT

## 2025-07-30 NOTE — PATIENT INSTRUCTIONS
1) Take prenatal vitamin once daily. There is often iron in the medication. Iron can cause constipation in some people, so I recommend increasing your water intake to prevent this from occurring. Iron can also cause stools to become darker, this is normal.   2) If you are experiencing morning sickness you may take Vitamin B-6 and Unisom (available over the counter in sleep aid isle). You can take these together before bed. If this is not giving enough relief, you can take it before bed and in the morning, but Unisom may cause drowsiness in the morning. Follow up if uncontrollable vomiting or concern for dehydration develops.  3) Follow up with your OB/GYN as planned.  4) If you choose to terminate the pregnancy it should be done before you are 20 weeks along. You are approximately 7 weeks 4 days along currently.  5) Avoid drinking alcohol, eating seafood or fish. Avoid changing litter box. Consult a medical provider prior to starting any new medications.  6) Seek emergency medical attention if you develop any severe abdominal pains.

## 2025-07-30 NOTE — LETTER
July 30, 2025      Cassidy Fernandes  [address] [place] [address] [address] [place] [address]        To Whom It May Concern:    Cassidy Fernandes  was seen on 7/30/2025.  Please excuse her absence from work this week due to medical concern.        Sincerely,        Jackelyn Mckinney PA-C    Electronically signed
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
## 2025-07-30 NOTE — TELEPHONE ENCOUNTER
"24 yo f  reports nausea in early pregnancy    Pt reports missing some work due to nausea. Pt has not had nurse intake visit yet but is seeking medication for nausea, a doctors note excusing her from work, and a confirmation of pregnancy for her provider. RN advised seeing her PCP for this immediate need as she is not established yet. Pt scheduled for nurse intake  and IOB , RN assisted pt in scheduling tomorrow with PCP for her needs and let her know that after her intake visit we can take over her care . Pt instructions on n/v given:    Nausea or vomiting (feeling sick to the stomach or throwing up)  Eat small meals often. Drink plenty of fluids (no caffeine). Eat bland foods such as crackers, dry toast, rice or pasta without sauce. Try not to let yourself get too hungry.  Note that prenatal vitamins can cause nausea and vomiting. To decrease nausea and vomiting, always take them with food. It may help to take them in the evening. Ask your doctor about a chewable vitamin, or you can have two children's vitamins (such as Flintstones). You can also try:  -kiah root tea  -Sea Bands (acupressure wristbands used for sea sickness, carried by many pharmacies)    If these treatments don't help, ask your provider about:  -vitamin B6 (pyridoxine). Take 10 to 25 mg every 8 hours.  -doxylamine (Unisom tablets--not \"sleep gels\").Take 25 mg at bedtime and 12.5 mg in the morning and afternoon.  -calcium carbonate (Tums), unless you have a history of kidney stones  -meclizine (Bonine, Dramamine II)    If you cannot keep any fluids down without vomiting for over 24 hours, call your care provider. You may need to come to the hospital for IV fluids and medicines.   Nausea and vomiting late in pregnancy could be a sign of a common but sometimes serious problem (called pre-eclampsia). If none of these treatments help, call your care provider.      Pt scheduled with her PCP tomorrow  for follow up    Diane Khalil, " RN on 7/30/2025 at 10:37 AM

## 2025-08-05 ENCOUNTER — VIRTUAL VISIT (OUTPATIENT)
Dept: OBGYN | Facility: CLINIC | Age: 25
End: 2025-08-05
Payer: COMMERCIAL

## 2025-08-05 ENCOUNTER — OFFICE VISIT (OUTPATIENT)
Dept: FAMILY MEDICINE | Facility: CLINIC | Age: 25
End: 2025-08-05
Payer: COMMERCIAL

## 2025-08-05 VITALS
BODY MASS INDEX: 33.65 KG/M2 | DIASTOLIC BLOOD PRESSURE: 66 MMHG | SYSTOLIC BLOOD PRESSURE: 109 MMHG | OXYGEN SATURATION: 99 % | WEIGHT: 193 LBS | HEART RATE: 87 BPM | RESPIRATION RATE: 16 BRPM | TEMPERATURE: 98.2 F

## 2025-08-05 VITALS — BODY MASS INDEX: 32.95 KG/M2 | WEIGHT: 193 LBS | HEIGHT: 64 IN

## 2025-08-05 DIAGNOSIS — Z32.01 PREGNANCY TEST POSITIVE: Primary | ICD-10-CM

## 2025-08-05 DIAGNOSIS — Z34.80 PRENATAL CARE, SUBSEQUENT PREGNANCY, ANTEPARTUM: Primary | ICD-10-CM

## 2025-08-05 DIAGNOSIS — Z87.59 HISTORY OF PRE-ECLAMPSIA: ICD-10-CM

## 2025-08-05 DIAGNOSIS — F33.42 RECURRENT MAJOR DEPRESSIVE DISORDER, IN FULL REMISSION: ICD-10-CM

## 2025-08-05 LAB — HCG UR QL: POSITIVE

## 2025-08-05 PROCEDURE — 99207 PR NO CHARGE NURSE ONLY: CPT

## 2025-08-05 PROCEDURE — 81025 URINE PREGNANCY TEST: CPT | Performed by: FAMILY MEDICINE

## 2025-08-05 PROCEDURE — 99214 OFFICE O/P EST MOD 30 MIN: CPT | Performed by: FAMILY MEDICINE

## 2025-08-05 PROCEDURE — 96127 BRIEF EMOTIONAL/BEHAV ASSMT: CPT | Performed by: FAMILY MEDICINE

## 2025-08-05 ASSESSMENT — PATIENT HEALTH QUESTIONNAIRE - PHQ9
SUM OF ALL RESPONSES TO PHQ QUESTIONS 1-9: 2
10. IF YOU CHECKED OFF ANY PROBLEMS, HOW DIFFICULT HAVE THESE PROBLEMS MADE IT FOR YOU TO DO YOUR WORK, TAKE CARE OF THINGS AT HOME, OR GET ALONG WITH OTHER PEOPLE: SOMEWHAT DIFFICULT
SUM OF ALL RESPONSES TO PHQ QUESTIONS 1-9: 2

## 2025-08-06 ENCOUNTER — OFFICE VISIT (OUTPATIENT)
Dept: PSYCHIATRY | Facility: CLINIC | Age: 25
End: 2025-08-06
Payer: COMMERCIAL

## 2025-08-06 ENCOUNTER — HOSPITAL ENCOUNTER (OUTPATIENT)
Dept: ULTRASOUND IMAGING | Facility: HOSPITAL | Age: 25
Discharge: HOME | End: 2025-08-06
Attending: FAMILY MEDICINE | Admitting: RADIOLOGY
Payer: COMMERCIAL

## 2025-08-06 DIAGNOSIS — F43.10 POSTTRAUMATIC STRESS DISORDER: ICD-10-CM

## 2025-08-06 DIAGNOSIS — Z32.01 PREGNANCY TEST POSITIVE: ICD-10-CM

## 2025-08-06 DIAGNOSIS — F33.1 MAJOR DEPRESSIVE DISORDER, RECURRENT, MODERATE (H): Primary | ICD-10-CM

## 2025-08-06 PROCEDURE — 76817 TRANSVAGINAL US OBSTETRIC: CPT

## 2025-08-06 PROCEDURE — 90837 PSYTX W PT 60 MINUTES: CPT | Performed by: PSYCHOLOGIST

## 2025-08-06 ASSESSMENT — ANXIETY QUESTIONNAIRES
GAD7 TOTAL SCORE: 11
GAD7 TOTAL SCORE: 11
1. FEELING NERVOUS, ANXIOUS, OR ON EDGE: MORE THAN HALF THE DAYS
4. TROUBLE RELAXING: SEVERAL DAYS
3. WORRYING TOO MUCH ABOUT DIFFERENT THINGS: MORE THAN HALF THE DAYS
7. FEELING AFRAID AS IF SOMETHING AWFUL MIGHT HAPPEN: SEVERAL DAYS
5. BEING SO RESTLESS THAT IT IS HARD TO SIT STILL: SEVERAL DAYS
6. BECOMING EASILY ANNOYED OR IRRITABLE: MORE THAN HALF THE DAYS
IF YOU CHECKED OFF ANY PROBLEMS ON THIS QUESTIONNAIRE, HOW DIFFICULT HAVE THESE PROBLEMS MADE IT FOR YOU TO DO YOUR WORK, TAKE CARE OF THINGS AT HOME, OR GET ALONG WITH OTHER PEOPLE: SOMEWHAT DIFFICULT
2. NOT BEING ABLE TO STOP OR CONTROL WORRYING: MORE THAN HALF THE DAYS

## 2025-08-06 ASSESSMENT — PATIENT HEALTH QUESTIONNAIRE - PHQ9: SUM OF ALL RESPONSES TO PHQ QUESTIONS 1-9: 14

## 2025-08-17 LAB
ABO + RH BLD: NORMAL
BLD GP AB SCN SERPL QL: NEGATIVE
SPECIMEN EXP DATE BLD: NORMAL

## 2025-08-18 ENCOUNTER — PRENATAL OFFICE VISIT (OUTPATIENT)
Dept: MIDWIFE SERVICES | Facility: CLINIC | Age: 25
End: 2025-08-18
Payer: COMMERCIAL

## 2025-08-18 VITALS
HEART RATE: 89 BPM | SYSTOLIC BLOOD PRESSURE: 120 MMHG | BODY MASS INDEX: 33.28 KG/M2 | WEIGHT: 194.9 LBS | HEIGHT: 64 IN | DIASTOLIC BLOOD PRESSURE: 64 MMHG | OXYGEN SATURATION: 97 %

## 2025-08-18 DIAGNOSIS — Z98.891 HISTORY OF C-SECTION: ICD-10-CM

## 2025-08-18 DIAGNOSIS — F43.10 POSTTRAUMATIC STRESS DISORDER: ICD-10-CM

## 2025-08-18 DIAGNOSIS — F33.1 MAJOR DEPRESSIVE DISORDER, RECURRENT, MODERATE (H): ICD-10-CM

## 2025-08-18 DIAGNOSIS — Z34.80 PRENATAL CARE, SUBSEQUENT PREGNANCY, ANTEPARTUM: Primary | ICD-10-CM

## 2025-08-18 DIAGNOSIS — O21.9 NAUSEA AND VOMITING IN PREGNANCY: ICD-10-CM

## 2025-08-18 DIAGNOSIS — Z87.59 HISTORY OF PRE-ECLAMPSIA: ICD-10-CM

## 2025-08-18 LAB
BASOPHILS # BLD AUTO: <0.04 10E3/UL (ref 0–0.2)
BASOPHILS NFR BLD AUTO: 0.4 %
EOSINOPHIL # BLD AUTO: 0.15 10E3/UL (ref 0–0.7)
EOSINOPHIL NFR BLD AUTO: 2.2 %
ERYTHROCYTE [DISTWIDTH] IN BLOOD BY AUTOMATED COUNT: 11.6 % (ref 10–15)
EST. AVERAGE GLUCOSE BLD GHB EST-MCNC: 97 MG/DL
HBA1C MFR BLD: 5 % (ref 0–5.6)
HBV SURFACE AG SERPL QL IA: NONREACTIVE
HCT VFR BLD AUTO: 38.5 % (ref 35–47)
HCV AB SERPL QL IA: NONREACTIVE
HGB BLD-MCNC: 13 G/DL (ref 11.7–15.7)
HIV 1+2 AB+HIV1 P24 AG SERPL QL IA: NONREACTIVE
IMM GRANULOCYTES # BLD: 0.05 10E3/UL
IMM GRANULOCYTES NFR BLD: 0.7 %
LYMPHOCYTES # BLD AUTO: 1.2 10E3/UL (ref 0.8–5.3)
LYMPHOCYTES NFR BLD AUTO: 17.3 %
MCH RBC QN AUTO: 30.9 PG (ref 26.5–33)
MCHC RBC AUTO-ENTMCNC: 33.8 G/DL (ref 31.5–36.5)
MCV RBC AUTO: 91.4 FL (ref 78–100)
MONOCYTES # BLD AUTO: 0.59 10E3/UL (ref 0–1.3)
MONOCYTES NFR BLD AUTO: 8.5 %
NEUTROPHILS # BLD AUTO: 4.9 10E3/UL (ref 1.6–8.3)
NEUTROPHILS NFR BLD AUTO: 70.9 %
PLATELET # BLD AUTO: 237 10E3/UL (ref 150–450)
RBC # BLD AUTO: 4.21 10E6/UL (ref 3.8–5.2)
RUBV IGG SERPL QL IA: 1.4 INDEX
RUBV IGG SERPL QL IA: POSITIVE
T PALLIDUM AB SER QL: NONREACTIVE
WBC # BLD AUTO: 6.92 10E3/UL (ref 4–11)

## 2025-08-18 PROCEDURE — 86762 RUBELLA ANTIBODY: CPT | Performed by: ADVANCED PRACTICE MIDWIFE

## 2025-08-18 PROCEDURE — 85025 COMPLETE CBC W/AUTO DIFF WBC: CPT | Performed by: ADVANCED PRACTICE MIDWIFE

## 2025-08-18 PROCEDURE — 86780 TREPONEMA PALLIDUM: CPT | Performed by: ADVANCED PRACTICE MIDWIFE

## 2025-08-18 PROCEDURE — 36415 COLL VENOUS BLD VENIPUNCTURE: CPT | Performed by: ADVANCED PRACTICE MIDWIFE

## 2025-08-18 PROCEDURE — 86850 RBC ANTIBODY SCREEN: CPT | Performed by: ADVANCED PRACTICE MIDWIFE

## 2025-08-18 PROCEDURE — 86803 HEPATITIS C AB TEST: CPT | Performed by: ADVANCED PRACTICE MIDWIFE

## 2025-08-18 PROCEDURE — 83036 HEMOGLOBIN GLYCOSYLATED A1C: CPT | Performed by: ADVANCED PRACTICE MIDWIFE

## 2025-08-18 PROCEDURE — 87340 HEPATITIS B SURFACE AG IA: CPT | Performed by: ADVANCED PRACTICE MIDWIFE

## 2025-08-18 PROCEDURE — 86900 BLOOD TYPING SEROLOGIC ABO: CPT | Performed by: ADVANCED PRACTICE MIDWIFE

## 2025-08-18 PROCEDURE — 87389 HIV-1 AG W/HIV-1&-2 AB AG IA: CPT | Performed by: ADVANCED PRACTICE MIDWIFE

## 2025-08-18 PROCEDURE — 86901 BLOOD TYPING SEROLOGIC RH(D): CPT | Performed by: ADVANCED PRACTICE MIDWIFE

## 2025-08-18 RX ORDER — ONDANSETRON 4 MG/1
4 TABLET, ORALLY DISINTEGRATING ORAL EVERY 8 HOURS PRN
Qty: 30 TABLET | Refills: 2 | Status: SHIPPED | OUTPATIENT
Start: 2025-08-18

## 2025-08-18 ASSESSMENT — EDINBURGH POSTNATAL DEPRESSION SCALE (EPDS)
I HAVE FELT SCARED OR PANICKY FOR NO GOOD REASON: NO, NOT MUCH
I HAVE BEEN ABLE TO LAUGH AND SEE THE FUNNY SIDE OF THINGS: NOT QUITE SO MUCH NOW
THE THOUGHT OF HARMING MYSELF HAS OCCURRED TO ME: NEVER
I HAVE BEEN SO UNHAPPY THAT I HAVE HAD DIFFICULTY SLEEPING: NOT AT ALL
I HAVE BLAMED MYSELF UNNECESSARILY WHEN THINGS WENT WRONG: NOT VERY OFTEN
I HAVE FELT SAD OR MISERABLE: NOT VERY OFTEN
I HAVE BEEN ANXIOUS OR WORRIED FOR NO GOOD REASON: YES, SOMETIMES
THINGS HAVE BEEN GETTING ON TOP OF ME: NO, MOST OF THE TIME I HAVE COPED QUITE WELL
TOTAL SCORE: 7
I HAVE LOOKED FORWARD WITH ENJOYMENT TO THINGS: AS MUCH AS I EVER DID
I HAVE BEEN SO UNHAPPY THAT I HAVE BEEN CRYING: NO, NEVER

## 2025-08-19 ENCOUNTER — HOSPITAL ENCOUNTER (OUTPATIENT)
Dept: ULTRASOUND IMAGING | Facility: HOSPITAL | Age: 25
Discharge: HOME | End: 2025-08-19
Attending: ADVANCED PRACTICE MIDWIFE | Admitting: RADIOLOGY
Payer: COMMERCIAL

## 2025-08-19 DIAGNOSIS — Z34.80 PRENATAL CARE, SUBSEQUENT PREGNANCY, ANTEPARTUM: ICD-10-CM

## 2025-08-19 LAB — BACTERIA UR CULT: NORMAL

## 2025-08-19 PROCEDURE — 76817 TRANSVAGINAL US OBSTETRIC: CPT

## 2025-08-20 ENCOUNTER — RESULTS FOLLOW-UP (OUTPATIENT)
Dept: OBGYN | Facility: CLINIC | Age: 25
End: 2025-08-20
Payer: COMMERCIAL

## 2025-09-03 ENCOUNTER — HOSPITAL ENCOUNTER (OUTPATIENT)
Dept: ULTRASOUND IMAGING | Facility: HOSPITAL | Age: 25
Discharge: HOME | End: 2025-09-03
Attending: ADVANCED PRACTICE MIDWIFE | Admitting: ADVANCED PRACTICE MIDWIFE
Payer: COMMERCIAL

## 2025-09-03 ENCOUNTER — PRENATAL OFFICE VISIT (OUTPATIENT)
Dept: MIDWIFE SERVICES | Facility: CLINIC | Age: 25
End: 2025-09-03
Payer: COMMERCIAL

## 2025-09-03 ENCOUNTER — TELEPHONE (OUTPATIENT)
Dept: MIDWIFE SERVICES | Facility: CLINIC | Age: 25
End: 2025-09-03

## 2025-09-03 VITALS
DIASTOLIC BLOOD PRESSURE: 68 MMHG | OXYGEN SATURATION: 97 % | SYSTOLIC BLOOD PRESSURE: 120 MMHG | WEIGHT: 199 LBS | BODY MASS INDEX: 34.7 KG/M2 | HEART RATE: 111 BPM

## 2025-09-03 DIAGNOSIS — Z34.80 PRENATAL CARE, SUBSEQUENT PREGNANCY, ANTEPARTUM: Primary | ICD-10-CM

## 2025-09-03 DIAGNOSIS — Z34.80 PRENATAL CARE, SUBSEQUENT PREGNANCY, ANTEPARTUM: ICD-10-CM

## 2025-09-03 DIAGNOSIS — O02.1 MISSED AB: Primary | ICD-10-CM

## 2025-09-03 PROCEDURE — 99213 OFFICE O/P EST LOW 20 MIN: CPT | Performed by: ADVANCED PRACTICE MIDWIFE

## 2025-09-03 PROCEDURE — 76801 OB US < 14 WKS SINGLE FETUS: CPT

## 2025-09-04 ENCOUNTER — PATIENT OUTREACH (OUTPATIENT)
Dept: CARE COORDINATION | Facility: CLINIC | Age: 25
End: 2025-09-04
Payer: COMMERCIAL

## 2025-09-04 ENCOUNTER — MYC MEDICAL ADVICE (OUTPATIENT)
Dept: MIDWIFE SERVICES | Facility: CLINIC | Age: 25
End: 2025-09-04
Payer: COMMERCIAL

## (undated) DEVICE — GLOVE BIOGEL PI MICRO INDICATOR UNDERGLOVE SZ 8.0 48980

## (undated) DEVICE — SOL NACL 0.9% IRRIG 1000ML BOTTLE 2F7124

## (undated) DEVICE — CAST PADDING 4" UNSTERILE 9044

## (undated) DEVICE — DRESSING MEPILEX BORDER POST-OP 4X12

## (undated) DEVICE — DRSG STERI STRIP 1/2X4" R1547

## (undated) DEVICE — ESU GROUND PAD ADULT W/CORD E7507

## (undated) DEVICE — SOL WATER IRRIG 1000ML BOTTLE 07139-09

## (undated) DEVICE — GLOVE BIOGEL PI SZ 7.5 40875

## (undated) DEVICE — DRAPE SHEET REV FOLD 3/4 9349

## (undated) DEVICE — PAD INSERT MED PEACH 14X6.5 62550

## (undated) DEVICE — SU VICRYL 3-0 RB-1 27" UND J215H

## (undated) DEVICE — DRSG PRIMAPORE 04X11 3/4"

## (undated) DEVICE — DRAPE STERI TOWEL SM 1000

## (undated) DEVICE — SUTURE PDS 0 60IN CTX+ LOOPED PDP990G

## (undated) DEVICE — SU PLAIN 3-0 XLH  27" 52T

## (undated) DEVICE — GLOVE UNDER INDICATOR PI SZ 7.0 LF 41670

## (undated) DEVICE — BNDG ELASTIC 4"X5YDS UNSTERILE 6611-40

## (undated) DEVICE — NDL 19GA 1.5"

## (undated) DEVICE — SUTURE VICRYL+ 0 36IN CT-1 UND VCP946H

## (undated) DEVICE — PLATE GROUNDING ADULT W/CORD 9165L

## (undated) DEVICE — GLOVE SURG PI ULTRA TOUCH M SZ 6-1/2 LF

## (undated) DEVICE — PACK MINOR SINGLE BASIN SSK3001

## (undated) DEVICE — PACK HAND WRIST SOP15HWFSP

## (undated) DEVICE — SU PROLENE 3-0 PS-2 18" 8687H

## (undated) DEVICE — SOL WATER IRRIG 1000ML BOTTLE 2F7114

## (undated) DEVICE — SUTURE MONOCRYL+ 3-0 27 UND MCP523H

## (undated) DEVICE — UNDERPAD 30X30 BULK 949B10

## (undated) DEVICE — CUSTOM PACK C-SECTION LHE

## (undated) DEVICE — GLOVE BIOGEL INDICATOR 7.5 LF 41675

## (undated) DEVICE — Device

## (undated) DEVICE — SOL PRP PVP IOD .75OZ PCH PKT CNTNR STRL DYNDA2232A

## (undated) DEVICE — PACK MAJOR BASIN 673

## (undated) DEVICE — PREP CHLORAPREP 26ML TINTED HI-LITE ORANGE 930815

## (undated) DEVICE — SUCTION MANIFOLD NEPTUNE 2 SYS 1 PORT 702-025-000

## (undated) DEVICE — LINER PRINTED RED HAZARD 24X24 A4824PRRO

## (undated) DEVICE — PREP CHLORAPREP 26ML TINTED ORANGE  260815

## (undated) DEVICE — DRSG STERI STRIP 1/4X3" R1541

## (undated) RX ORDER — ACETAMINOPHEN 325 MG/1
TABLET ORAL
Status: DISPENSED
Start: 2024-11-01

## (undated) RX ORDER — ONDANSETRON 2 MG/ML
INJECTION INTRAMUSCULAR; INTRAVENOUS
Status: DISPENSED
Start: 2023-01-10

## (undated) RX ORDER — CEFAZOLIN SODIUM 1 G/3ML
INJECTION, POWDER, FOR SOLUTION INTRAMUSCULAR; INTRAVENOUS
Status: DISPENSED
Start: 2024-11-01

## (undated) RX ORDER — OXYCODONE HYDROCHLORIDE 5 MG/1
TABLET ORAL
Status: DISPENSED
Start: 2024-11-01

## (undated) RX ORDER — LIDOCAINE HYDROCHLORIDE 10 MG/ML
INJECTION, SOLUTION EPIDURAL; INFILTRATION; INTRACAUDAL; PERINEURAL
Status: DISPENSED
Start: 2023-01-17

## (undated) RX ORDER — KETOROLAC TROMETHAMINE 30 MG/ML
INJECTION, SOLUTION INTRAMUSCULAR; INTRAVENOUS
Status: DISPENSED
Start: 2023-01-10

## (undated) RX ORDER — FENTANYL CITRATE-0.9 % NACL/PF 10 MCG/ML
PLASTIC BAG, INJECTION (ML) INTRAVENOUS
Status: DISPENSED
Start: 2023-01-10

## (undated) RX ORDER — FENTANYL CITRATE 50 UG/ML
INJECTION, SOLUTION INTRAMUSCULAR; INTRAVENOUS
Status: DISPENSED
Start: 2024-11-01

## (undated) RX ORDER — DEXAMETHASONE SODIUM PHOSPHATE 4 MG/ML
INJECTION, SOLUTION INTRA-ARTICULAR; INTRALESIONAL; INTRAMUSCULAR; INTRAVENOUS; SOFT TISSUE
Status: DISPENSED
Start: 2023-01-10

## (undated) RX ORDER — MORPHINE SULFATE 1 MG/ML
INJECTION, SOLUTION EPIDURAL; INTRATHECAL; INTRAVENOUS
Status: DISPENSED
Start: 2023-01-10